# Patient Record
Sex: MALE | Race: WHITE | NOT HISPANIC OR LATINO | Employment: FULL TIME | URBAN - METROPOLITAN AREA
[De-identification: names, ages, dates, MRNs, and addresses within clinical notes are randomized per-mention and may not be internally consistent; named-entity substitution may affect disease eponyms.]

---

## 2017-02-17 RX ORDER — AMLODIPINE BESYLATE 10 MG/1
10 TABLET ORAL DAILY
Qty: 30 TABLET | Refills: 0 | Status: SHIPPED | OUTPATIENT
Start: 2017-02-17 | End: 2017-09-25 | Stop reason: SDUPTHER

## 2017-04-27 ENCOUNTER — OFFICE VISIT (OUTPATIENT)
Dept: FAMILY MEDICINE CLINIC | Facility: CLINIC | Age: 42
End: 2017-04-27

## 2017-04-27 VITALS
OXYGEN SATURATION: 96 % | SYSTOLIC BLOOD PRESSURE: 110 MMHG | RESPIRATION RATE: 16 BRPM | DIASTOLIC BLOOD PRESSURE: 80 MMHG | HEIGHT: 68 IN | HEART RATE: 90 BPM | BODY MASS INDEX: 34.86 KG/M2 | WEIGHT: 230 LBS | TEMPERATURE: 98.7 F

## 2017-04-27 DIAGNOSIS — J06.9 ACUTE URI: Primary | ICD-10-CM

## 2017-04-27 LAB
EXPIRATION DATE: NORMAL
INTERNAL CONTROL: NORMAL
Lab: NORMAL
S PYO AG THROAT QL: NEGATIVE

## 2017-04-27 PROCEDURE — 99213 OFFICE O/P EST LOW 20 MIN: CPT | Performed by: PHYSICIAN ASSISTANT

## 2017-04-27 PROCEDURE — 87880 STREP A ASSAY W/OPTIC: CPT | Performed by: PHYSICIAN ASSISTANT

## 2017-04-27 RX ORDER — AZITHROMYCIN 250 MG/1
TABLET, FILM COATED ORAL
Qty: 6 TABLET | Refills: 0 | Status: SHIPPED | OUTPATIENT
Start: 2017-04-27 | End: 2017-09-25

## 2017-04-27 NOTE — PROGRESS NOTES
Subjective   Brandan Higuera is a 41 y.o. male.     History of Present Illness   Brandan Higuera 41 y.o. male who presents for evaluation of upper respiratory congestion, sore throat, cough, fatigue. Symptoms include ear pressure, nasal blockage, post nasal drip, productive cough and hoarseness.  Onset of symptoms was 3 days ago, gradually worsening since that time. Patient denies shortness of breath, wheezing, fever.   Evaluation to date: none Treatment to date:  OTC oral decongestants and OTC antihistamines.       The following portions of the patient's history were reviewed and updated as appropriate: allergies, current medications, past family history, past medical history, past social history, past surgical history and problem list.    Review of Systems   Constitutional: Positive for fatigue. Negative for activity change and unexpected weight change.   HENT: Positive for congestion, ear discharge, postnasal drip, sore throat and trouble swallowing. Negative for ear pain.    Eyes: Negative for pain and discharge.   Respiratory: Positive for cough. Negative for chest tightness, shortness of breath and wheezing.    Cardiovascular: Negative for chest pain and palpitations.   Gastrointestinal: Negative for abdominal pain, diarrhea and vomiting.   Endocrine: Negative.    Genitourinary: Negative.    Musculoskeletal: Positive for neck pain and neck stiffness. Negative for joint swelling.   Skin: Negative for color change, rash and wound.   Allergic/Immunologic: Negative.    Neurological: Positive for dizziness and headaches. Negative for seizures and syncope.   Psychiatric/Behavioral: Negative.        Objective   Physical Exam   Constitutional: He is oriented to person, place, and time. He appears well-developed and well-nourished.  Non-toxic appearance. No distress.   HENT:   Head: Normocephalic and atraumatic. Hair is normal.   Right Ear: External ear normal. No drainage, swelling or tenderness. Tympanic membrane  is retracted.   Left Ear: External ear normal. No drainage, swelling or tenderness. Tympanic membrane is retracted.   Nose: Mucosal edema present. No epistaxis.   Mouth/Throat: Uvula is midline and mucous membranes are normal. No oral lesions. No uvula swelling. Posterior oropharyngeal erythema present. No oropharyngeal exudate.   Eyes: Conjunctivae and EOM are normal. Pupils are equal, round, and reactive to light. Right eye exhibits no discharge. Left eye exhibits no discharge. No scleral icterus.   Neck: Normal range of motion. Neck supple.   Cardiovascular: Normal rate, regular rhythm and normal heart sounds.  Exam reveals no gallop.    No murmur heard.  Pulmonary/Chest: Breath sounds normal. No stridor. No respiratory distress. He has no wheezes. He has no rales. He exhibits no tenderness.   Abdominal: Soft. There is no tenderness.   Lymphadenopathy:     He has cervical adenopathy.   Neurological: He is alert and oriented to person, place, and time. He exhibits normal muscle tone.   Skin: Skin is warm and dry. No rash noted. He is not diaphoretic.   Psychiatric: He has a normal mood and affect. His behavior is normal. Judgment and thought content normal.   Nursing note and vitals reviewed.      Assessment/Plan   Problems Addressed this Visit     None      Visit Diagnoses     Acute URI    -  Primary    Relevant Medications    azithromycin (ZITHROMAX) 250 MG tablet    Other Relevant Orders    POC Rapid Strep A

## 2017-04-27 NOTE — PATIENT INSTRUCTIONS
For throat pain:  Can gargle with 1/2 Maalox and 1/2 Benadryl liquid ---mix, gargle and spit Take Tylenol for fever or aches  Rest as needed  Call not better in 7 days  Increase fluids  Call if worse  Can use generic Afrin nasal spray up to 5 days for nasal congestion  Finish antibiotic course of therapy      Start Zpak if worse or still sick after a week

## 2017-09-25 ENCOUNTER — OFFICE VISIT (OUTPATIENT)
Dept: FAMILY MEDICINE CLINIC | Facility: CLINIC | Age: 42
End: 2017-09-25

## 2017-09-25 VITALS
HEIGHT: 68 IN | SYSTOLIC BLOOD PRESSURE: 140 MMHG | DIASTOLIC BLOOD PRESSURE: 100 MMHG | OXYGEN SATURATION: 98 % | BODY MASS INDEX: 35.01 KG/M2 | WEIGHT: 231 LBS | TEMPERATURE: 98.4 F | RESPIRATION RATE: 16 BRPM | HEART RATE: 64 BPM

## 2017-09-25 DIAGNOSIS — E78.2 MIXED HYPERLIPIDEMIA: ICD-10-CM

## 2017-09-25 DIAGNOSIS — I10 ESSENTIAL HYPERTENSION: Primary | ICD-10-CM

## 2017-09-25 DIAGNOSIS — E55.9 VITAMIN D DEFICIENCY DISEASE: ICD-10-CM

## 2017-09-25 DIAGNOSIS — Z87.39 HISTORY OF GOUT: ICD-10-CM

## 2017-09-25 DIAGNOSIS — Z00.00 LABORATORY EXAMINATION ORDERED AS PART OF A ROUTINE GENERAL MEDICAL EXAMINATION: ICD-10-CM

## 2017-09-25 PROCEDURE — 99213 OFFICE O/P EST LOW 20 MIN: CPT | Performed by: PHYSICIAN ASSISTANT

## 2017-09-25 RX ORDER — AMLODIPINE BESYLATE 10 MG/1
10 TABLET ORAL DAILY
Qty: 90 TABLET | Refills: 1 | Status: SHIPPED | OUTPATIENT
Start: 2017-09-25 | End: 2018-04-06 | Stop reason: SDUPTHER

## 2017-09-25 RX ORDER — LISINOPRIL 20 MG/1
TABLET ORAL
Qty: 90 TABLET | Refills: 1 | Status: SHIPPED | OUTPATIENT
Start: 2017-09-25 | End: 2018-04-06 | Stop reason: SDUPTHER

## 2017-09-25 NOTE — PROGRESS NOTES
Subjective   Brandan Higuera is a 42 y.o. male.     History of Present Illness   Brandan Higuera 42 y.o. male who presents today for routine follow up check and medication refills.  he has a history of   Patient Active Problem List   Diagnosis   • Hypertension   • Vitamin D deficiency disease   • Hyperlipidemia   • Acute idiopathic gout of ankle   .  Since the last visit, he has overall felt well.  He has bp has been controlled and out of meds for 2 mos and will restart Rx.  he has been compliant with current medications have reviewed them.  The patient denies medication side effects.  He is off Allopurinol and will let me know if wants to restart it.  No results found for: CHOL  Lab Results   Component Value Date    TRIG 274 (H) 11/03/2016     Lab Results   Component Value Date    HDL 49 11/03/2016     No results found for: LDLCALC  Lab Results   Component Value Date    LDL 81 11/03/2016     No results found for: HDLLDLRATIO  No components found for: CHOLHDL  Lab Results   Component Value Date    BUN 16 11/03/2016    CREATININE 1.19 11/03/2016    EGFRIFNONA 68 11/03/2016    EGFRIFAFRI 82 11/03/2016    BCR 13.4 11/03/2016    K 4.1 11/03/2016    CO2 25.1 11/03/2016    CALCIUM 9.9 11/03/2016    PROTENTOTREF 7.1 11/03/2016    ALBUMIN 4.70 11/03/2016    LABIL2 2.0 11/03/2016    AST 21 11/03/2016    ALT 19 11/03/2016       Sees Serena GALAN for psych    On e cig  He needs [facility] paperwork for gout  I need report 2-3 episodes a year and miss 3-4 days of work at a time for treatment.  He gets in great toe and can progress to knee with red, hot, edematous, painful joint and extreme pain to weight bear.  He has oral pred for flare ups.   The following portions of the patient's history were reviewed and updated as appropriate: allergies, current medications, past family history, past medical history, past social history, past surgical history and problem list.    Review of Systems   Constitutional: Negative for activity change,  appetite change and unexpected weight change.   HENT: Negative for nosebleeds and trouble swallowing.    Eyes: Negative for pain and visual disturbance.   Respiratory: Negative for chest tightness, shortness of breath and wheezing.    Cardiovascular: Negative for chest pain and palpitations.   Gastrointestinal: Negative for abdominal pain and blood in stool.   Endocrine: Negative.    Genitourinary: Negative for difficulty urinating and hematuria.   Musculoskeletal: Negative for joint swelling.   Skin: Negative for color change and rash.   Allergic/Immunologic: Negative.    Neurological: Negative for syncope and speech difficulty.   Hematological: Negative for adenopathy.   Psychiatric/Behavioral: Negative for agitation and confusion.   All other systems reviewed and are negative.      Objective   Physical Exam   Constitutional: He is oriented to person, place, and time. He appears well-developed and well-nourished. No distress.   HENT:   Head: Normocephalic and atraumatic.   Eyes: Conjunctivae and EOM are normal. Pupils are equal, round, and reactive to light. Right eye exhibits no discharge. Left eye exhibits no discharge. No scleral icterus.   Neck: Normal range of motion. Neck supple. No tracheal deviation present. No thyromegaly present.   Cardiovascular: Normal rate, regular rhythm, normal heart sounds, intact distal pulses and normal pulses.  Exam reveals no gallop.    No murmur heard.  Pulmonary/Chest: Effort normal and breath sounds normal. No respiratory distress. He has no wheezes. He has no rales.   Musculoskeletal: Normal range of motion.   Neurological: He is alert and oriented to person, place, and time. He exhibits normal muscle tone. Coordination normal.   Skin: Skin is warm. No rash noted. No erythema. No pallor.   Psychiatric: He has a normal mood and affect. His behavior is normal. Judgment and thought content normal.   Nursing note and vitals reviewed.      Assessment/Plan   Problems Addressed  this Visit        Cardiovascular and Mediastinum    Hypertension - Primary    Relevant Medications    lisinopril (PRINIVIL,ZESTRIL) 20 MG tablet    amLODIPine (NORVASC) 10 MG tablet    Other Relevant Orders    Comprehensive metabolic panel    Lipid panel    CBC and Differential    TSH    Urinalysis With Microscopic    Uric Acid    T4, Free    Hyperlipidemia    Relevant Orders    Comprehensive metabolic panel    Lipid panel    CBC and Differential    TSH    Urinalysis With Microscopic    Uric Acid    T4, Free       Digestive    Vitamin D deficiency disease    Relevant Orders    Comprehensive metabolic panel    Lipid panel    CBC and Differential    TSH    Urinalysis With Microscopic    Uric Acid    T4, Free      Other Visit Diagnoses     History of gout        Relevant Orders    Comprehensive metabolic panel    Lipid panel    CBC and Differential    TSH    Urinalysis With Microscopic    Uric Acid    T4, Free    Laboratory examination ordered as part of a routine general medical examination        Relevant Orders    Comprehensive metabolic panel    Lipid panel    CBC and Differential    TSH    Urinalysis With Microscopic    Uric Acid    T4, Free

## 2017-09-25 NOTE — PATIENT INSTRUCTIONS
Stop smoking  Low glycemic index diet  Exercise 30 minutes most days of the week  Make sure you get results on any labs or tests we ordered today  We discussed medications and how to take them as prescribed  Sleep 6-8 hours each night if possible  If you have not signed up for [email or web address], please activate your code ASAP from your After Visit Summary today    LDL goal <100  LDL goal if heart disease <70  HDL goal >60  Triglyceride goal <150  BP goal =<130/80  Fasting glucose <100

## 2018-04-06 ENCOUNTER — OFFICE VISIT (OUTPATIENT)
Dept: FAMILY MEDICINE CLINIC | Facility: CLINIC | Age: 43
End: 2018-04-06

## 2018-04-06 VITALS
HEIGHT: 68 IN | RESPIRATION RATE: 16 BRPM | TEMPERATURE: 97.7 F | WEIGHT: 236 LBS | HEART RATE: 88 BPM | DIASTOLIC BLOOD PRESSURE: 80 MMHG | OXYGEN SATURATION: 96 % | SYSTOLIC BLOOD PRESSURE: 120 MMHG | BODY MASS INDEX: 35.77 KG/M2

## 2018-04-06 DIAGNOSIS — E55.9 VITAMIN D DEFICIENCY DISEASE: ICD-10-CM

## 2018-04-06 DIAGNOSIS — M10.071 ACUTE IDIOPATHIC GOUT OF RIGHT ANKLE: Primary | ICD-10-CM

## 2018-04-06 DIAGNOSIS — Z00.00 LABORATORY EXAMINATION ORDERED AS PART OF A ROUTINE GENERAL MEDICAL EXAMINATION: ICD-10-CM

## 2018-04-06 DIAGNOSIS — I10 ESSENTIAL HYPERTENSION: ICD-10-CM

## 2018-04-06 DIAGNOSIS — E78.2 MIXED HYPERLIPIDEMIA: ICD-10-CM

## 2018-04-06 PROCEDURE — 99213 OFFICE O/P EST LOW 20 MIN: CPT | Performed by: PHYSICIAN ASSISTANT

## 2018-04-06 RX ORDER — PREDNISONE 20 MG/1
20 TABLET ORAL 2 TIMES DAILY
Qty: 14 TABLET | Refills: 3 | Status: SHIPPED | OUTPATIENT
Start: 2018-04-06 | End: 2019-02-26 | Stop reason: SDUPTHER

## 2018-04-06 RX ORDER — AMLODIPINE BESYLATE 10 MG/1
10 TABLET ORAL DAILY
Qty: 90 TABLET | Refills: 1 | Status: SHIPPED | OUTPATIENT
Start: 2018-04-06 | End: 2019-01-22 | Stop reason: SDUPTHER

## 2018-04-06 RX ORDER — LISINOPRIL 20 MG/1
TABLET ORAL
Qty: 90 TABLET | Refills: 1 | Status: SHIPPED | OUTPATIENT
Start: 2018-04-06 | End: 2018-05-10 | Stop reason: SDUPTHER

## 2018-04-06 NOTE — PROGRESS NOTES
Subjective   Brandan Higuera is a 42 y.o. male.     History of Present Illness   Brandan Higuera 42 y.o. male who presents today for routine follow up check and medication refills.  he has a history of   Patient Active Problem List   Diagnosis   • Hypertension   • Vitamin D deficiency disease   • Hyperlipidemia   • Acute idiopathic gout of ankle   .  Since the last visit, he has overall felt well.  He has Hypertenision and is well controlled on medication, Hyperlipidemia and working on this with diet and exercise and Vitamin D deficiency and will update labs to confirm level is at goal >30.  he has been compliant with current medications have reviewed them.  The patient denies medication side effects.  Still need to update labs!!!! Copy to [facility]  Results for orders placed or performed in visit on 04/27/17   POC Rapid Strep A   Result Value Ref Range    Rapid Strep A Screen Negative Negative, VALID, INVALID, Not Performed    Internal Control Passed Passed    Lot Number [id number]     Expiration Date 7/2,018        Gout flare right ankle since yesterday;  It's been a year since had it  The following portions of the patient's history were reviewed and updated as appropriate: allergies, current medications, past family history, past medical history, past social history, past surgical history and problem list.    Review of Systems   Constitutional: Positive for fatigue. Negative for activity change, appetite change and unexpected weight change.   HENT: Positive for sinus pressure. Negative for nosebleeds and trouble swallowing.    Eyes: Negative for pain and visual disturbance.   Respiratory: Negative for chest tightness, shortness of breath and wheezing.    Cardiovascular: Negative for chest pain and palpitations.   Gastrointestinal: Negative for abdominal pain and blood in stool.   Endocrine: Negative.    Genitourinary: Negative for difficulty urinating and hematuria.   Musculoskeletal: Negative for joint swelling.    Skin: Negative for color change and rash.   Allergic/Immunologic: Negative.    Neurological: Negative for syncope and speech difficulty.   Hematological: Negative for adenopathy.   Psychiatric/Behavioral: Negative for agitation and confusion.   All other systems reviewed and are negative.      Objective   Physical Exam   Constitutional: He is oriented to person, place, and time. He appears well-developed and well-nourished. No distress.   HENT:   Head: Normocephalic and atraumatic.   Eyes: Conjunctivae and EOM are normal. Pupils are equal, round, and reactive to light. Right eye exhibits no discharge. Left eye exhibits no discharge. No scleral icterus.   Neck: Normal range of motion. Neck supple. No tracheal deviation present. No thyromegaly present.   Cardiovascular: Normal rate, regular rhythm, normal heart sounds, intact distal pulses and normal pulses.  Exam reveals no gallop.    No murmur heard.  Pulmonary/Chest: Effort normal and breath sounds normal. No respiratory distress. He has no wheezes. He has no rales.   Musculoskeletal: Normal range of motion.   Neurological: He is alert and oriented to person, place, and time. He exhibits normal muscle tone. Coordination normal.   Skin: Skin is warm. No rash noted. There is erythema. No pallor.   Right ankle with minimal edema and pink; not sore now to touch;  Pain to walk;    Psychiatric: He has a normal mood and affect. His behavior is normal. Judgment and thought content normal.   Nursing note and vitals reviewed.      Assessment/Plan   Problems Addressed this Visit        Cardiovascular and Mediastinum    Hypertension    Relevant Orders    Comprehensive metabolic panel    Lipid panel    CBC and Differential    TSH    T4, Free    Hemoglobin A1c    Vitamin B12    Folate    Vitamin D 25 Hydroxy    Urinalysis With Microscopic - Urine, Clean Catch    Uric Acid    Hyperlipidemia    Relevant Orders    Comprehensive metabolic panel    Lipid panel    CBC and  Differential    TSH    T4, Free    Hemoglobin A1c    Vitamin B12    Folate    Vitamin D 25 Hydroxy    Urinalysis With Microscopic - Urine, Clean Catch    Uric Acid       Digestive    Vitamin D deficiency disease    Relevant Orders    Comprehensive metabolic panel    Lipid panel    CBC and Differential    TSH    T4, Free    Hemoglobin A1c    Vitamin B12    Folate    Vitamin D 25 Hydroxy    Urinalysis With Microscopic - Urine, Clean Catch    Uric Acid       Musculoskeletal and Integument    Acute idiopathic gout of ankle - Primary    Relevant Orders    Comprehensive metabolic panel    Lipid panel    CBC and Differential    TSH    T4, Free    Hemoglobin A1c    Vitamin B12    Folate    Vitamin D 25 Hydroxy    Urinalysis With Microscopic - Urine, Clean Catch    Uric Acid      Other Visit Diagnoses     Laboratory examination ordered as part of a routine general medical examination        Relevant Orders    Comprehensive metabolic panel    Lipid panel    CBC and Differential    TSH    T4, Free    Hemoglobin A1c    Vitamin B12    Folate    Vitamin D 25 Hydroxy    Urinalysis With Microscopic - Urine, Clean Catch    Uric Acid

## 2018-04-06 NOTE — PATIENT INSTRUCTIONS
Low glycemic index diet  Exercise 30 minutes most days of the week  Make sure you get results on any labs or tests we ordered today  We discussed medications and how to take them as prescribed  Sleep 6-8 hours each night if possible  If you have not signed up for [employer], please activate your code ASAP from your After Visit Summary today    LDL goal <100  LDL goal if heart disease <70  HDL goal >60  Triglyceride goal <150  BP goal =<130/80  Fasting glucose <100      Gout  Gout is painful swelling that can occur in some of your joints. Gout is a type of arthritis. This condition is caused by having too much uric acid in your body. Uric acid is a chemical that forms when your body breaks down substances called purines. Purines are important for building body proteins.  When your body has too much uric acid, sharp crystals can form and build up inside your joints. This causes pain and swelling. Gout attacks can happen quickly and be very painful (acute gout). Over time, the attacks can affect more joints and become more frequent (chronic gout). Gout can also cause uric acid to build up under your skin and inside your kidneys.  What are the causes?  This condition is caused by too much uric acid in your blood. This can occur because:  · Your kidneys do not remove enough uric acid from your blood. This is the most common cause.  · Your body makes too much uric acid. This can occur with some cancers and cancer treatments. It can also occur if your body is breaking down too many red blood cells (hemolytic anemia).  · You eat too many foods that are high in purines. These foods include organ meats and some seafood. Alcohol, especially beer, is also high in purines.  A gout attack may be triggered by trauma or stress.  What increases the risk?  This condition is more likely to develop in people who:  · Have a family history of gout.  · Are male and middle-aged.  · Are female and have gone through menopause.  · Are  obese.  · Frequently drink alcohol, especially beer.  · Are dehydrated.  · Lose weight too quickly.  · Have an organ transplant.  · Have lead poisoning.  · Take certain medicines, including aspirin, cyclosporine, diuretics, levodopa, and niacin.  · Have kidney disease or psoriasis.  What are the signs or symptoms?  An attack of acute gout happens quickly. It usually occurs in just one joint. The most common place is the big toe. Attacks often start at night. Other joints that may be affected include joints of the feet, ankle, knee, fingers, wrist, or elbow. Symptoms may include:  · Severe pain.  · Warmth.  · Swelling.  · Stiffness.  · Tenderness. The affected joint may be very painful to touch.  · Shiny, red, or purple skin.  · Chills and fever.  Chronic gout may cause symptoms more frequently. More joints may be involved. You may also have white or yellow lumps (tophi) on your hands or feet or in other areas near your joints.  How is this diagnosed?  This condition is diagnosed based on your symptoms, medical history, and physical exam. You may have tests, such as:  · Blood tests to measure uric acid levels.  · Removal of joint fluid with a needle (aspiration) to look for uric acid crystals.  · X-rays to look for joint damage.  How is this treated?  Treatment for this condition has two phases: treating an acute attack and preventing future attacks. Acute gout treatment may include medicines to reduce pain and swelling, including:  · NSAIDs.  · Steroids. These are strong anti-inflammatory medicines that can be taken by mouth (orally) or injected into a joint.  · Colchicine. This medicine relieves pain and swelling when it is taken soon after an attack. It can be given orally or through an IV tube.  Preventive treatment may include:  · Daily use of smaller doses of NSAIDs or colchicine.  · Use of a medicine that reduces uric acid levels in your blood.  · Changes to your diet. You may need to see a specialist about  healthy eating (dietitian).  Follow these instructions at home:  During a Gout Attack   · If directed, apply ice to the affected area:  ¨ Put ice in a plastic bag.  ¨ Place a towel between your skin and the bag.  ¨ Leave the ice on for 20 minutes, 2-3 times a day.  · Rest the joint as much as possible. If the affected joint is in your leg, you may be given crutches to use.  · Raise (elevate) the affected joint above the level of your heart as often as possible.  · Drink enough fluids to keep your urine clear or pale yellow.  · Take over-the-counter and prescription medicines only as told by your health care provider.  · Do not drive or operate heavy machinery while taking prescription pain medicine.  · Follow instructions from your health care provider about eating or drinking restrictions.  · Return to your normal activities as told by your health care provider. Ask your health care provider what activities are safe for you.  Avoiding Future Gout Attacks   · Follow a low-purine diet as told by your dietitian or health care provider. Avoid foods and drinks that are high in purines, including liver, kidney, anchovies, asparagus, herring, mushrooms, mussels, and beer.  · Limit alcohol intake to no more than 1 drink a day for nonpregnant women and 2 drinks a day for men. One drink equals 12 oz of beer, 5 oz of wine, or 1½ oz of hard liquor.  · Maintain a healthy weight or lose weight if you are overweight. If you want to lose weight, talk with your health care provider. It is important that you do not lose weight too quickly.  · Start or maintain an exercise program as told by your health care provider.  · Drink enough fluids to keep your urine clear or pale yellow.  · Take over-the-counter and prescription medicines only as told by your health care provider.  · Keep all follow-up visits as told by your health care provider. This is important.  Contact a health care provider if:  · You have another gout attack.  · You  continue to have symptoms of a gout attack after10 days of treatment.  · You have side effects from your medicines.  · You have chills or a fever.  · You have burning pain when you urinate.  · You have pain in your lower back or belly.  Get help right away if:  · You have severe or uncontrolled pain.  · You cannot urinate.  This information is not intended to replace advice given to you by your health care provider. Make sure you discuss any questions you have with your health care provider.  Document Released: 12/15/2001 Document Revised: 05/25/2017 Document Reviewed: 09/29/2016  [employer] Interactive Patient Education © 2017 [employer]  Low-Purine Diet  Purines are compounds that affect the level of uric acid in your body. A low-purine diet is a diet that is low in purines. Eating a low-purine diet can prevent the level of uric acid in your body from getting too high and causing gout or kidney stones or both.  What do I need to know about this diet?  · Choose low-purine foods. Examples of low-purine foods are listed in the next section.  · Drink plenty of fluids, especially water. Fluids can help remove uric acid from your body. Try to drink 8-16 cups (1.9-3.8 L) a day.  · Limit foods high in fat, especially saturated fat, as fat makes it harder for the body to get rid of uric acid. Foods high in saturated fat include pizza, cheese, ice cream, whole milk, fried foods, and gravies. Choose foods that are lower in fat and lean sources of protein. Use olive oil when cooking as it contains healthy fats that are not high in saturated fat.  · Limit alcohol. Alcohol interferes with the elimination of uric acid from your body. If you are having a gout attack, avoid all alcohol.  · Keep in mind that different people’s bodies react differently to different foods. You will probably learn over time which foods do or do not affect you. If you discover that a food tends to cause your gout to flare up, avoid eating that food.  You can more freely enjoy foods that do not cause problems. If you have any questions about a food item, talk to your dietitian or health care provider.  Which foods are low, moderate, and high in purines?  The following is a list of foods that are low, moderate, and high in purines. You can eat any amount of the foods that are low in purines. You may be able to have small amounts of foods that are moderate in purines. Ask your health care provider how much of a food moderate in purines you can have. Avoid foods high in purines.  Grains   · Foods low in purines: Enriched white bread, pasta, rice, cake, cornbread, popcorn.  · Foods moderate in purines: Whole-grain breads and cereals, wheat germ, bran, oatmeal. Uncooked oatmeal. Dry wheat bran or wheat germ.  · Foods high in purines: Pancakes, [place] toast, biscuits, muffins.  Vegetables   · Foods low in purines: All vegetables, except those that are moderate in purines.  · Foods moderate in purines: Asparagus, cauliflower, spinach, mushrooms, green peas.  Fruits   · All fruits are low in purines.  Meats and other Protein Foods   · Foods low in purines: Eggs, nuts, peanut butter.  · Foods moderate in purines: 80-90% lean beef, lamb, veal, pork, poultry, fish, eggs, peanut butter, nuts. Crab, lobster, oysters, and shrimp. Cooked dried beans, peas, and lentils.  · Foods high in purines: Anchovies, sardines, herring, mussels, tuna, codfish, scallops, trout, and barbara. [place]. Organ meats (such as liver or kidney). Tripe. Game meat. Goose. Sweetbreads.  Dairy   · All dairy foods are low in purines. Low-fat and fat-free dairy products are best because they are low in saturated fat.  Beverages   · Drinks low in purines: Water, carbonated beverages, tea, coffee, cocoa.  · Drinks moderate in purines: Soft drinks and other drinks sweetened with high-fructose corn syrup. Juices. To find whether a food or drink is sweetened with high-fructose corn syrup, look at the ingredients  list.  · Drinks high in purines: Alcoholic beverages (such as beer).  Condiments   · Foods low in purines: Salt, herbs, olives, pickles, relishes, vinegar.  · Foods moderate in purines: Butter, margarine, oils, mayonnaise.  Fats and Oils   · Foods low in purines: All types, except gravies and sauces made with meat.  · Foods high in purines: Gravies and sauces made with meat.  Other Foods   · Foods low in purines: Sugars, sweets, gelatin. Cake. Soups made without meat.  · Foods moderate in purines: Meat-based or fish-based soups, broths, or bouillons. Foods and drinks sweetened with high-fructose corn syrup.  · Foods high in purines: High-fat desserts (such as ice cream, cookies, cakes, pies, doughnuts, and chocolate).  Contact your dietitian for more information on foods that are not listed here.   This information is not intended to replace advice given to you by your health care provider. Make sure you discuss any questions you have with your health care provider.  Document Released: 04/13/2012 Document Revised: 05/25/2017 Document Reviewed: 11/24/2014  [employer] Interactive Patient Education © 2017 [employer]

## 2018-05-10 RX ORDER — LISINOPRIL 20 MG/1
TABLET ORAL
Qty: 90 TABLET | Refills: 1 | Status: SHIPPED | OUTPATIENT
Start: 2018-05-10 | End: 2018-11-09 | Stop reason: SDUPTHER

## 2018-11-09 RX ORDER — LISINOPRIL 20 MG/1
TABLET ORAL
Qty: 90 TABLET | Refills: 0 | Status: SHIPPED | OUTPATIENT
Start: 2018-11-09 | End: 2019-02-26

## 2019-01-22 RX ORDER — AMLODIPINE BESYLATE 10 MG/1
10 TABLET ORAL DAILY
Qty: 30 TABLET | Refills: 0 | Status: SHIPPED | OUTPATIENT
Start: 2019-01-22 | End: 2019-02-26 | Stop reason: SDUPTHER

## 2019-02-25 ENCOUNTER — OFFICE VISIT (OUTPATIENT)
Dept: FAMILY MEDICINE CLINIC | Facility: CLINIC | Age: 44
End: 2019-02-25

## 2019-02-25 VITALS
DIASTOLIC BLOOD PRESSURE: 84 MMHG | BODY MASS INDEX: 35.77 KG/M2 | RESPIRATION RATE: 16 BRPM | OXYGEN SATURATION: 98 % | SYSTOLIC BLOOD PRESSURE: 130 MMHG | HEIGHT: 68 IN | HEART RATE: 76 BPM | TEMPERATURE: 97.8 F | WEIGHT: 236 LBS

## 2019-02-25 DIAGNOSIS — E55.9 VITAMIN D DEFICIENCY DISEASE: ICD-10-CM

## 2019-02-25 DIAGNOSIS — I10 ESSENTIAL HYPERTENSION: Primary | ICD-10-CM

## 2019-02-25 DIAGNOSIS — Z00.00 LABORATORY EXAMINATION ORDERED AS PART OF A ROUTINE GENERAL MEDICAL EXAMINATION: ICD-10-CM

## 2019-02-25 DIAGNOSIS — E78.2 MIXED HYPERLIPIDEMIA: ICD-10-CM

## 2019-02-25 DIAGNOSIS — M10.079 ACUTE IDIOPATHIC GOUT OF ANKLE, UNSPECIFIED LATERALITY: ICD-10-CM

## 2019-02-25 PROCEDURE — 90732 PPSV23 VACC 2 YRS+ SUBQ/IM: CPT | Performed by: PHYSICIAN ASSISTANT

## 2019-02-25 PROCEDURE — 90472 IMMUNIZATION ADMIN EACH ADD: CPT | Performed by: PHYSICIAN ASSISTANT

## 2019-02-25 PROCEDURE — 99214 OFFICE O/P EST MOD 30 MIN: CPT | Performed by: PHYSICIAN ASSISTANT

## 2019-02-25 PROCEDURE — 90715 TDAP VACCINE 7 YRS/> IM: CPT | Performed by: PHYSICIAN ASSISTANT

## 2019-02-25 PROCEDURE — 90471 IMMUNIZATION ADMIN: CPT | Performed by: PHYSICIAN ASSISTANT

## 2019-02-25 NOTE — PATIENT INSTRUCTIONS
Low glycemic index diet  Exercise 30 minutes most days of the week  Make sure you get results on any labs or tests we ordered today  We discussed medications and how to take them as prescribed  Sleep 6-8 hours each night if possible  If you have not signed up for [employer], please activate your code ASAP from your After Visit Summary today    LDL goal <100  LDL goal if heart disease <70  HDL goal >60  Triglyceride goal <150  BP goal =<130/80  Fasting glucose <100

## 2019-02-25 NOTE — PROGRESS NOTES
Subjective   Brandan Higuera is a 43 y.o. male.     History of Present Illness   Brandan Higuera 43 y.o. male who presents today for routine follow up check and medication refills.  he has a history of   Patient Active Problem List   Diagnosis   • Hypertension   • Vitamin D deficiency disease   • Mixed hyperlipidemia   • Acute idiopathic gout of ankle   .  Since the last visit, he has overall felt well.  He has Hyperlipidemia and working on this with diet and exercise, Vitamin D deficiency and will update labs to confirm level is at goal >30 and HTN and here for med refills.  he has been compliant with current medications have reviewed them.  The patient denies medication side effects.  I will raise dose of Lisinopril to 40mg and see him back one month    Results for orders placed or performed in visit on 04/27/17   POC Rapid Strep A   Result Value Ref Range    Rapid Strep A Screen Negative Negative, VALID, INVALID, Not Performed    Internal Control Passed Passed    Lot Number [id number]     Expiration Date 7/2,018    had gout Jan and took pred--only needs about 2 days to stop it and works    Mom has DMII  I must have labs to continue bp meds; must show renal functions  Sees [facility] for psych  Brother had MI 38    I must get labs today and then will refill meds    Has DOT Physical for work    I will update vaccines; Tdap and pneumovax---smoker  The following portions of the patient's history were reviewed and updated as appropriate: allergies, current medications, past family history, past medical history, past social history, past surgical history and problem list.    Review of Systems   Constitutional: Negative for activity change, appetite change and unexpected weight change.   HENT: Positive for congestion and sinus pressure. Negative for nosebleeds and trouble swallowing.    Eyes: Negative for pain and visual disturbance.   Respiratory: Positive for cough. Negative for chest tightness, shortness of breath and  wheezing.    Cardiovascular: Positive for chest pain. Negative for palpitations.   Gastrointestinal: Negative for abdominal pain and blood in stool.   Endocrine: Negative.    Genitourinary: Negative for difficulty urinating and hematuria.   Musculoskeletal: Positive for neck pain and neck stiffness. Negative for joint swelling.   Skin: Negative for color change and rash.   Allergic/Immunologic: Negative.    Neurological: Negative for syncope and speech difficulty.   Hematological: Negative for adenopathy.   Psychiatric/Behavioral: Negative for agitation and confusion.   All other systems reviewed and are negative.      Objective   Physical Exam   Constitutional: He is oriented to person, place, and time. He appears well-developed and well-nourished. No distress.   HENT:   Head: Normocephalic and atraumatic.   Eyes: Conjunctivae and EOM are normal. Pupils are equal, round, and reactive to light. Right eye exhibits no discharge. Left eye exhibits no discharge. No scleral icterus.   Neck: Normal range of motion. Neck supple. No tracheal deviation present. No thyromegaly present.   Cardiovascular: Normal rate, regular rhythm, normal heart sounds, intact distal pulses and normal pulses. Exam reveals no gallop.   No murmur heard.  Pulmonary/Chest: Effort normal and breath sounds normal. No respiratory distress. He has no wheezes. He has no rales.   Musculoskeletal: Normal range of motion.   Neurological: He is alert and oriented to person, place, and time. He exhibits normal muscle tone. Coordination normal.   Skin: Skin is warm. No rash noted. No erythema. No pallor.   Psychiatric: He has a normal mood and affect. His behavior is normal. Judgment and thought content normal.   Nursing note and vitals reviewed.      Assessment/Plan   Problems Addressed this Visit        Cardiovascular and Mediastinum    Hypertension - Primary    Mixed hyperlipidemia       Digestive    Vitamin D deficiency disease       Musculoskeletal and  Integument    Acute idiopathic gout of ankle      Other Visit Diagnoses     Laboratory examination ordered as part of a routine general medical examination        Relevant Orders    Comprehensive metabolic panel    Lipid panel    CBC and Differential    TSH    Hemoglobin A1c    T4, Free    T3, Free    Uric Acid    Vitamin B12    Folate    Vitamin D 25 Hydroxy                  normal

## 2019-02-26 LAB
25(OH)D3+25(OH)D2 SERPL-MCNC: 49.3 NG/ML (ref 30–100)
ALBUMIN SERPL-MCNC: 4.6 G/DL (ref 3.5–5.2)
ALBUMIN/GLOB SERPL: 1.8 G/DL
ALP SERPL-CCNC: 68 U/L (ref 39–117)
ALT SERPL-CCNC: 22 U/L (ref 1–41)
AST SERPL-CCNC: 20 U/L (ref 1–40)
BASOPHILS # BLD AUTO: 0.07 10*3/MM3 (ref 0–0.2)
BASOPHILS NFR BLD AUTO: 1.1 % (ref 0–1.5)
BILIRUB SERPL-MCNC: 0.3 MG/DL (ref 0.1–1.2)
BUN SERPL-MCNC: 11 MG/DL (ref 6–20)
BUN/CREAT SERPL: 10.8 (ref 7–25)
CALCIUM SERPL-MCNC: 9.8 MG/DL (ref 8.6–10.5)
CHLORIDE SERPL-SCNC: 102 MMOL/L (ref 98–107)
CHOLEST SERPL-MCNC: 204 MG/DL (ref 0–200)
CO2 SERPL-SCNC: 26.6 MMOL/L (ref 22–29)
CREAT SERPL-MCNC: 1.02 MG/DL (ref 0.76–1.27)
EOSINOPHIL # BLD AUTO: 0.65 10*3/MM3 (ref 0–0.4)
EOSINOPHIL NFR BLD AUTO: 10.1 % (ref 0.3–6.2)
ERYTHROCYTE [DISTWIDTH] IN BLOOD BY AUTOMATED COUNT: 13.4 % (ref 12.3–15.4)
FOLATE SERPL-MCNC: 7.85 NG/ML (ref 4.78–24.2)
GLOBULIN SER CALC-MCNC: 2.5 GM/DL
GLUCOSE SERPL-MCNC: 101 MG/DL (ref 65–99)
HBA1C MFR BLD: 5.3 % (ref 4.8–5.6)
HCT VFR BLD AUTO: 49.3 % (ref 37.5–51)
HDLC SERPL-MCNC: 51 MG/DL (ref 40–60)
HGB BLD-MCNC: 15.4 G/DL (ref 13–17.7)
IMM GRANULOCYTES # BLD AUTO: 0.04 10*3/MM3 (ref 0–0.05)
IMM GRANULOCYTES NFR BLD AUTO: 0.6 % (ref 0–0.5)
LDLC SERPL CALC-MCNC: 90 MG/DL (ref 0–100)
LYMPHOCYTES # BLD AUTO: 2.1 10*3/MM3 (ref 0.7–3.1)
LYMPHOCYTES NFR BLD AUTO: 32.6 % (ref 19.6–45.3)
MCH RBC QN AUTO: 27.7 PG (ref 26.6–33)
MCHC RBC AUTO-ENTMCNC: 31.2 G/DL (ref 31.5–35.7)
MCV RBC AUTO: 88.8 FL (ref 79–97)
MONOCYTES # BLD AUTO: 0.52 10*3/MM3 (ref 0.1–0.9)
MONOCYTES NFR BLD AUTO: 8.1 % (ref 5–12)
NEUTROPHILS # BLD AUTO: 3.07 10*3/MM3 (ref 1.4–7)
NEUTROPHILS NFR BLD AUTO: 47.5 % (ref 42.7–76)
NRBC BLD AUTO-RTO: 0 /100 WBC (ref 0–0)
PLATELET # BLD AUTO: 215 10*3/MM3 (ref 140–450)
POTASSIUM SERPL-SCNC: 4.9 MMOL/L (ref 3.5–5.2)
PROT SERPL-MCNC: 7.1 G/DL (ref 6–8.5)
RBC # BLD AUTO: 5.55 10*6/MM3 (ref 4.14–5.8)
SODIUM SERPL-SCNC: 141 MMOL/L (ref 136–145)
T3FREE SERPL-MCNC: 4 PG/ML (ref 2–4.4)
T4 FREE SERPL-MCNC: 1.03 NG/DL (ref 0.93–1.7)
TRIGL SERPL-MCNC: 315 MG/DL (ref 0–150)
TSH SERPL DL<=0.005 MIU/L-ACNC: 2.35 MIU/ML (ref 0.27–4.2)
URATE SERPL-MCNC: 10.5 MG/DL (ref 3.4–7)
VIT B12 SERPL-MCNC: 299 PG/ML (ref 211–946)
VLDLC SERPL CALC-MCNC: 63 MG/DL (ref 5–40)
WBC # BLD AUTO: 6.45 10*3/MM3 (ref 3.4–10.8)

## 2019-02-26 RX ORDER — AMLODIPINE BESYLATE 10 MG/1
10 TABLET ORAL DAILY
Qty: 90 TABLET | Refills: 0 | Status: SHIPPED | OUTPATIENT
Start: 2019-02-26 | End: 2019-05-27 | Stop reason: SDUPTHER

## 2019-02-26 RX ORDER — ALLOPURINOL 100 MG/1
TABLET ORAL
Qty: 90 TABLET | Refills: 0 | Status: SHIPPED | OUTPATIENT
Start: 2019-02-26 | End: 2019-03-25

## 2019-02-26 RX ORDER — PREDNISONE 20 MG/1
20 TABLET ORAL 2 TIMES DAILY
Qty: 14 TABLET | Refills: 3 | Status: SHIPPED | OUTPATIENT
Start: 2019-02-26 | End: 2020-01-13 | Stop reason: SDUPTHER

## 2019-02-26 RX ORDER — LISINOPRIL 40 MG/1
40 TABLET ORAL DAILY
Qty: 90 TABLET | Refills: 0 | Status: SHIPPED | OUTPATIENT
Start: 2019-02-26 | End: 2019-07-15 | Stop reason: SDUPTHER

## 2019-02-26 RX ORDER — ALLOPURINOL 300 MG/1
300 TABLET ORAL DAILY
Qty: 90 TABLET | Refills: 3 | Status: SHIPPED | OUTPATIENT
Start: 2019-02-26 | End: 2019-10-29 | Stop reason: SDUPTHER

## 2019-02-26 NOTE — PROGRESS NOTES
Spoke to pt, he is wanting to start on gout suppression medication, advised of all results and is following up on 3/25/19.

## 2019-03-25 ENCOUNTER — OFFICE VISIT (OUTPATIENT)
Dept: FAMILY MEDICINE CLINIC | Facility: CLINIC | Age: 44
End: 2019-03-25

## 2019-03-25 VITALS
WEIGHT: 240 LBS | OXYGEN SATURATION: 97 % | HEART RATE: 76 BPM | TEMPERATURE: 97.5 F | RESPIRATION RATE: 16 BRPM | SYSTOLIC BLOOD PRESSURE: 120 MMHG | BODY MASS INDEX: 36.37 KG/M2 | DIASTOLIC BLOOD PRESSURE: 78 MMHG | HEIGHT: 68 IN

## 2019-03-25 DIAGNOSIS — E79.0 ELEVATED BLOOD URIC ACID LEVEL: ICD-10-CM

## 2019-03-25 DIAGNOSIS — R79.89 ABNORMAL CBC: ICD-10-CM

## 2019-03-25 DIAGNOSIS — I10 ESSENTIAL HYPERTENSION: Primary | ICD-10-CM

## 2019-03-25 DIAGNOSIS — E55.9 VITAMIN D DEFICIENCY DISEASE: ICD-10-CM

## 2019-03-25 DIAGNOSIS — E78.2 MIXED HYPERLIPIDEMIA: ICD-10-CM

## 2019-03-25 DIAGNOSIS — Z87.39 HISTORY OF GOUT: ICD-10-CM

## 2019-03-25 PROCEDURE — 99213 OFFICE O/P EST LOW 20 MIN: CPT | Performed by: PHYSICIAN ASSISTANT

## 2019-03-25 RX ORDER — ARIPIPRAZOLE 5 MG/1
5 TABLET ORAL DAILY
COMMUNITY
Start: 2019-03-06 | End: 2019-10-29

## 2019-03-25 NOTE — PATIENT INSTRUCTIONS
Low glycemic index diet  Exercise 30 minutes most days of the week  Make sure you get results on any labs or tests we ordered today  We discussed medications and how to take them as prescribed  Sleep 6-8 hours each night if possible  If you have not signed up for [employer], please activate your code ASAP from your After Visit Summary today    LDL goal <100  LDL goal if heart disease <70  HDL goal >60  Triglyceride goal <150  BP goal =<130/80  Fasting glucose <100

## 2019-03-25 NOTE — PROGRESS NOTES
Subjective   Brandan Higuera is a 43 y.o. male.     History of Present Illness   Brandan Higuera 43 y.o. male who presents today for routine follow up check and medication refills.  he has a history of   Patient Active Problem List   Diagnosis   • Hypertension   • Vitamin D deficiency disease   • Mixed hyperlipidemia   • Acute idiopathic gout of ankle   .  Since the last visit, he has overall felt well.  He has Impaired fasting glucose and will continue close lab follow up to watch for DMII, Hyperlipidemia and working on this with diet and exercise, Vitamin D deficiency and well controlled on medication and labs at goal >30 and HTN---went up on Lisinopril last visit for elevated bp.  he has been compliant with current medications have reviewed them.  The patient denies medication side effects.  He is working on trigs with diet and exercise  Started Rx gout suppression--no recent gout---will get f/u labs  Also watching glucose  Started MVI to boost B12 and folate  Results for orders placed or performed in visit on 02/25/19   Comprehensive metabolic panel   Result Value Ref Range    Glucose 101 (H) 65 - 99 mg/dL    BUN 11 6 - 20 mg/dL    Creatinine 1.02 0.76 - 1.27 mg/dL    eGFR Non African Am 80 >60 mL/min/1.73    eGFR African Am 98 >60 mL/min/1.73    BUN/Creatinine Ratio 10.8 7.0 - 25.0    Sodium 141 136 - 145 mmol/L    Potassium 4.9 3.5 - 5.2 mmol/L    Chloride 102 98 - 107 mmol/L    Total CO2 26.6 22.0 - 29.0 mmol/L    Calcium 9.8 8.6 - 10.5 mg/dL    Total Protein 7.1 6.0 - 8.5 g/dL    Albumin 4.60 3.50 - 5.20 g/dL    Globulin 2.5 gm/dL    A/G Ratio 1.8 g/dL    Total Bilirubin 0.3 0.1 - 1.2 mg/dL    Alkaline Phosphatase 68 39 - 117 U/L    AST (SGOT) 20 1 - 40 U/L    ALT (SGPT) 22 1 - 41 U/L   Lipid panel   Result Value Ref Range    Total Cholesterol 204 (H) 0 - 200 mg/dL    Triglycerides 315 (H) 0 - 150 mg/dL    HDL Cholesterol 51 40 - 60 mg/dL    VLDL Cholesterol 63 (H) 5 - 40 mg/dL    LDL Cholesterol  90 0 -  100 mg/dL   TSH   Result Value Ref Range    TSH 2.350 0.270 - 4.200 mIU/mL   Hemoglobin A1c   Result Value Ref Range    Hemoglobin A1C 5.30 4.80 - 5.60 %   T4, Free   Result Value Ref Range    Free T4 1.03 0.93 - 1.70 ng/dL   T3, Free   Result Value Ref Range    T3, Free 4.0 2.0 - 4.4 pg/mL   Uric Acid   Result Value Ref Range    Uric Acid 10.5 (H) 3.4 - 7.0 mg/dL   Vitamin B12   Result Value Ref Range    Vitamin B-12 299 211 - 946 pg/mL   Folate   Result Value Ref Range    Folate 7.85 4.78 - 24.20 ng/mL   Vitamin D 25 Hydroxy   Result Value Ref Range    25 Hydroxy, Vitamin D 49.3 30.0 - 100.0 ng/ml   CBC and Differential   Result Value Ref Range    WBC 6.45 3.40 - 10.80 10*3/mm3    RBC 5.55 4.14 - 5.80 10*6/mm3    Hemoglobin 15.4 13.0 - 17.7 g/dL    Hematocrit 49.3 37.5 - 51.0 %    MCV 88.8 79.0 - 97.0 fL    MCH 27.7 26.6 - 33.0 pg    MCHC 31.2 (L) 31.5 - 35.7 g/dL    RDW 13.4 12.3 - 15.4 %    Platelets 215 140 - 450 10*3/mm3    Neutrophil Rel % 47.5 42.7 - 76.0 %    Lymphocyte Rel % 32.6 19.6 - 45.3 %    Monocyte Rel % 8.1 5.0 - 12.0 %    Eosinophil Rel % 10.1 (H) 0.3 - 6.2 %    Basophil Rel % 1.1 0.0 - 1.5 %    Neutrophils Absolute 3.07 1.40 - 7.00 10*3/mm3    Lymphocytes Absolute 2.10 0.70 - 3.10 10*3/mm3    Monocytes Absolute 0.52 0.10 - 0.90 10*3/mm3    Eosinophils Absolute 0.65 (H) 0.00 - 0.40 10*3/mm3    Basophils Absolute 0.07 0.00 - 0.20 10*3/mm3    Immature Granulocyte Rel % 0.6 (H) 0.0 - 0.5 %    Immature Grans Absolute 0.04 0.00 - 0.05 10*3/mm3    nRBC 0.0 0.0 - 0.0 /100 WBC         The following portions of the patient's history were reviewed and updated as appropriate: allergies, current medications, past family history, past medical history, past social history, past surgical history and problem list.    Review of Systems   Constitutional: Negative for activity change, appetite change and unexpected weight change.   HENT: Negative for nosebleeds and trouble swallowing.    Eyes: Negative for pain and  visual disturbance.   Respiratory: Negative for chest tightness, shortness of breath and wheezing.    Cardiovascular: Negative for chest pain and palpitations.   Gastrointestinal: Negative for abdominal pain and blood in stool.   Endocrine: Negative.    Genitourinary: Negative for difficulty urinating and hematuria.   Musculoskeletal: Negative for joint swelling.   Skin: Negative for color change and rash.   Allergic/Immunologic: Negative.    Neurological: Negative for syncope and speech difficulty.   Hematological: Negative for adenopathy.   Psychiatric/Behavioral: Negative for agitation and confusion.   All other systems reviewed and are negative.      Objective   Physical Exam   Constitutional: He is oriented to person, place, and time. He appears well-developed and well-nourished. No distress.   HENT:   Head: Normocephalic and atraumatic.   Eyes: Conjunctivae and EOM are normal. Pupils are equal, round, and reactive to light. Right eye exhibits no discharge. Left eye exhibits no discharge. No scleral icterus.   Neck: Normal range of motion. Neck supple. No tracheal deviation present. No thyromegaly present.   Cardiovascular: Normal rate, regular rhythm, normal heart sounds, intact distal pulses and normal pulses. Exam reveals no gallop.   No murmur heard.  Pulmonary/Chest: Effort normal and breath sounds normal. No respiratory distress. He has no wheezes. He has no rales.   Musculoskeletal: Normal range of motion.   Neurological: He is alert and oriented to person, place, and time. He exhibits normal muscle tone. Coordination normal.   Skin: Skin is warm. No rash noted. No erythema. No pallor.   Psychiatric: He has a normal mood and affect. His behavior is normal. Judgment and thought content normal.   Nursing note and vitals reviewed.      Assessment/Plan   Brandan was seen today for hypertension.    Diagnoses and all orders for this visit:    Essential hypertension  -     Glucose, Random; Future  -     Uric  Acid; Future  -     CBC & Differential; Future  -     Lipid Panel; Future    Vitamin D deficiency disease  -     Glucose, Random; Future  -     Uric Acid; Future  -     CBC & Differential; Future  -     Lipid Panel; Future    Mixed hyperlipidemia  -     Glucose, Random; Future  -     Uric Acid; Future  -     CBC & Differential; Future  -     Lipid Panel; Future    History of gout  -     Glucose, Random; Future  -     Uric Acid; Future  -     CBC & Differential; Future  -     Lipid Panel; Future    Abnormal CBC  -     Glucose, Random; Future  -     Uric Acid; Future  -     CBC & Differential; Future  -     Lipid Panel; Future    Elevated blood uric acid level  -     Glucose, Random; Future  -     Uric Acid; Future  -     CBC & Differential; Future  -     Lipid Panel; Future      Watching BP  Working on trigs and glucose with diet  Started Allopurinol and will get uric acid level 3 mos

## 2019-03-27 RX ORDER — ALLOPURINOL 100 MG/1
TABLET ORAL
Qty: 90 TABLET | Refills: 0 | Status: SHIPPED | OUTPATIENT
Start: 2019-03-27 | End: 2019-10-29 | Stop reason: SDUPTHER

## 2019-04-17 RX ORDER — ALLOPURINOL 100 MG/1
TABLET ORAL
Qty: 90 TABLET | Refills: 0 | OUTPATIENT
Start: 2019-04-17

## 2019-05-27 RX ORDER — AMLODIPINE BESYLATE 10 MG/1
10 TABLET ORAL DAILY
Qty: 90 TABLET | Refills: 0 | Status: SHIPPED | OUTPATIENT
Start: 2019-05-27 | End: 2019-09-20 | Stop reason: SDUPTHER

## 2019-06-17 ENCOUNTER — RESULTS ENCOUNTER (OUTPATIENT)
Dept: FAMILY MEDICINE CLINIC | Facility: CLINIC | Age: 44
End: 2019-06-17

## 2019-06-17 DIAGNOSIS — E79.0 ELEVATED BLOOD URIC ACID LEVEL: ICD-10-CM

## 2019-06-17 DIAGNOSIS — E55.9 VITAMIN D DEFICIENCY DISEASE: ICD-10-CM

## 2019-06-17 DIAGNOSIS — I10 ESSENTIAL HYPERTENSION: ICD-10-CM

## 2019-06-17 DIAGNOSIS — R79.89 ABNORMAL CBC: ICD-10-CM

## 2019-06-17 DIAGNOSIS — Z87.39 HISTORY OF GOUT: ICD-10-CM

## 2019-06-17 DIAGNOSIS — E78.2 MIXED HYPERLIPIDEMIA: ICD-10-CM

## 2019-07-15 RX ORDER — LISINOPRIL 40 MG/1
40 TABLET ORAL DAILY
Qty: 90 TABLET | Refills: 0 | Status: SHIPPED | OUTPATIENT
Start: 2019-07-15 | End: 2019-10-29 | Stop reason: SDUPTHER

## 2019-09-20 RX ORDER — AMLODIPINE BESYLATE 10 MG/1
10 TABLET ORAL DAILY
Qty: 30 TABLET | Refills: 0 | Status: SHIPPED | OUTPATIENT
Start: 2019-09-20 | End: 2019-10-29 | Stop reason: SDUPTHER

## 2019-10-29 ENCOUNTER — OFFICE VISIT (OUTPATIENT)
Dept: FAMILY MEDICINE CLINIC | Facility: CLINIC | Age: 44
End: 2019-10-29

## 2019-10-29 VITALS
HEIGHT: 68 IN | DIASTOLIC BLOOD PRESSURE: 78 MMHG | HEART RATE: 88 BPM | OXYGEN SATURATION: 98 % | BODY MASS INDEX: 39.4 KG/M2 | RESPIRATION RATE: 16 BRPM | SYSTOLIC BLOOD PRESSURE: 130 MMHG | WEIGHT: 260 LBS

## 2019-10-29 DIAGNOSIS — E55.9 VITAMIN D DEFICIENCY DISEASE: ICD-10-CM

## 2019-10-29 DIAGNOSIS — I10 ESSENTIAL HYPERTENSION: Primary | ICD-10-CM

## 2019-10-29 DIAGNOSIS — M54.50 LUMBAR PAIN: ICD-10-CM

## 2019-10-29 DIAGNOSIS — M10.079 ACUTE IDIOPATHIC GOUT OF ANKLE, UNSPECIFIED LATERALITY: ICD-10-CM

## 2019-10-29 DIAGNOSIS — Z00.00 LABORATORY EXAMINATION ORDERED AS PART OF A ROUTINE GENERAL MEDICAL EXAMINATION: ICD-10-CM

## 2019-10-29 DIAGNOSIS — E78.2 MIXED HYPERLIPIDEMIA: ICD-10-CM

## 2019-10-29 PROCEDURE — 99214 OFFICE O/P EST MOD 30 MIN: CPT | Performed by: PHYSICIAN ASSISTANT

## 2019-10-29 RX ORDER — AMLODIPINE BESYLATE 10 MG/1
10 TABLET ORAL DAILY
Qty: 90 TABLET | Refills: 1 | Status: SHIPPED | OUTPATIENT
Start: 2019-10-29 | End: 2020-05-20

## 2019-10-29 RX ORDER — LISINOPRIL 40 MG/1
40 TABLET ORAL DAILY
Qty: 90 TABLET | Refills: 1 | Status: SHIPPED | OUTPATIENT
Start: 2019-10-29 | End: 2020-07-12

## 2019-10-29 RX ORDER — ALLOPURINOL 100 MG/1
TABLET ORAL
Qty: 90 TABLET | Refills: 0 | Status: SHIPPED | OUTPATIENT
Start: 2019-10-29 | End: 2019-11-20 | Stop reason: SDUPTHER

## 2019-10-29 RX ORDER — BACLOFEN 10 MG/1
10 TABLET ORAL 3 TIMES DAILY
Qty: 30 TABLET | Refills: 1 | Status: SHIPPED | OUTPATIENT
Start: 2019-10-29 | End: 2020-01-17

## 2019-10-29 RX ORDER — ALLOPURINOL 300 MG/1
300 TABLET ORAL DAILY
Qty: 90 TABLET | Refills: 3 | Status: SHIPPED | OUTPATIENT
Start: 2019-10-29 | End: 2020-01-17 | Stop reason: SDUPTHER

## 2019-10-29 NOTE — PROGRESS NOTES
"Subjective   Brandan Higuera is a 44 y.o. male.     History of Present Illness    Since the last visit, he has overall felt fairly well.  He has Essential Hypertension and well controlled on current medication, Impaired fasting glucose and will monitor labs to watch for DMII, Hyperlipidemia and working on this with diet and exercise and Vitamin D deficiency and labs are at goal >30 ng/mL.  he has been compliant with current medications have reviewed them.  The patient denies medication side effects.  Will refill medications. /78   Pulse 110   Resp 16   Ht 172.7 cm (68\")   Wt 118 kg (260 lb)   SpO2 98%   BMI 39.53 kg/m²     Results for orders placed or performed in visit on 02/25/19   Comprehensive metabolic panel   Result Value Ref Range    Glucose 101 (H) 65 - 99 mg/dL    BUN 11 6 - 20 mg/dL    Creatinine 1.02 0.76 - 1.27 mg/dL    eGFR Non African Am 80 >60 mL/min/1.73    eGFR African Am 98 >60 mL/min/1.73    BUN/Creatinine Ratio 10.8 7.0 - 25.0    Sodium 141 136 - 145 mmol/L    Potassium 4.9 3.5 - 5.2 mmol/L    Chloride 102 98 - 107 mmol/L    Total CO2 26.6 22.0 - 29.0 mmol/L    Calcium 9.8 8.6 - 10.5 mg/dL    Total Protein 7.1 6.0 - 8.5 g/dL    Albumin 4.60 3.50 - 5.20 g/dL    Globulin 2.5 gm/dL    A/G Ratio 1.8 g/dL    Total Bilirubin 0.3 0.1 - 1.2 mg/dL    Alkaline Phosphatase 68 39 - 117 U/L    AST (SGOT) 20 1 - 40 U/L    ALT (SGPT) 22 1 - 41 U/L   Lipid panel   Result Value Ref Range    Total Cholesterol 204 (H) 0 - 200 mg/dL    Triglycerides 315 (H) 0 - 150 mg/dL    HDL Cholesterol 51 40 - 60 mg/dL    VLDL Cholesterol 63 (H) 5 - 40 mg/dL    LDL Cholesterol  90 0 - 100 mg/dL   TSH   Result Value Ref Range    TSH 2.350 0.270 - 4.200 mIU/mL   Hemoglobin A1c   Result Value Ref Range    Hemoglobin A1C 5.30 4.80 - 5.60 %   T4, Free   Result Value Ref Range    Free T4 1.03 0.93 - 1.70 ng/dL   T3, Free   Result Value Ref Range    T3, Free 4.0 2.0 - 4.4 pg/mL   Uric Acid   Result Value Ref Range    Uric " Acid 10.5 (H) 3.4 - 7.0 mg/dL   Vitamin B12   Result Value Ref Range    Vitamin B-12 299 211 - 946 pg/mL   Folate   Result Value Ref Range    Folate 7.85 4.78 - 24.20 ng/mL   Vitamin D 25 Hydroxy   Result Value Ref Range    25 Hydroxy, Vitamin D 49.3 30.0 - 100.0 ng/ml   CBC and Differential   Result Value Ref Range    WBC 6.45 3.40 - 10.80 10*3/mm3    RBC 5.55 4.14 - 5.80 10*6/mm3    Hemoglobin 15.4 13.0 - 17.7 g/dL    Hematocrit 49.3 37.5 - 51.0 %    MCV 88.8 79.0 - 97.0 fL    MCH 27.7 26.6 - 33.0 pg    MCHC 31.2 (L) 31.5 - 35.7 g/dL    RDW 13.4 12.3 - 15.4 %    Platelets 215 140 - 450 10*3/mm3    Neutrophil Rel % 47.5 42.7 - 76.0 %    Lymphocyte Rel % 32.6 19.6 - 45.3 %    Monocyte Rel % 8.1 5.0 - 12.0 %    Eosinophil Rel % 10.1 (H) 0.3 - 6.2 %    Basophil Rel % 1.1 0.0 - 1.5 %    Neutrophils Absolute 3.07 1.40 - 7.00 10*3/mm3    Lymphocytes Absolute 2.10 0.70 - 3.10 10*3/mm3    Monocytes Absolute 0.52 0.10 - 0.90 10*3/mm3    Eosinophils Absolute 0.65 (H) 0.00 - 0.40 10*3/mm3    Basophils Absolute 0.07 0.00 - 0.20 10*3/mm3    Immature Granulocyte Rel % 0.6 (H) 0.0 - 0.5 %    Immature Grans Absolute 0.04 0.00 - 0.05 10*3/mm3    nRBC 0.0 0.0 - 0.0 /100 WBC   left piriformis soreness for few days; ROM pain; moved furniture at home; I will start Rx; occas pain thigh      Recent gout flare  Had one dose pred last PM  Need to start on Allopurinol to suppress gout  F/u on labs; and trigs up last time  Weight up and off Abilify    The following portions of the patient's history were reviewed and updated as appropriate: allergies, current medications, past family history, past medical history, past social history, past surgical history and problem list.    Review of Systems   Constitutional: Negative for activity change, appetite change and unexpected weight change.   HENT: Negative for nosebleeds and trouble swallowing.    Eyes: Negative for pain and visual disturbance.   Respiratory: Negative for chest tightness,  shortness of breath and wheezing.    Cardiovascular: Negative for chest pain and palpitations.   Gastrointestinal: Negative for abdominal pain and blood in stool.   Endocrine: Negative.    Genitourinary: Negative for difficulty urinating and hematuria.   Musculoskeletal: Negative for joint swelling.   Skin: Negative for color change and rash.   Allergic/Immunologic: Negative.    Neurological: Negative for syncope and speech difficulty.   Hematological: Negative for adenopathy.   Psychiatric/Behavioral: Negative for agitation and confusion.   All other systems reviewed and are negative.      Objective   Physical Exam   Constitutional: He is oriented to person, place, and time. He appears well-developed and well-nourished. No distress.   HENT:   Head: Normocephalic and atraumatic.   Eyes: Conjunctivae and EOM are normal. Pupils are equal, round, and reactive to light. Right eye exhibits no discharge. Left eye exhibits no discharge. No scleral icterus.   Neck: Normal range of motion. Neck supple. No tracheal deviation present. No thyromegaly present.   Cardiovascular: Normal rate, regular rhythm, normal heart sounds, intact distal pulses and normal pulses. Exam reveals no gallop.   No murmur heard.  Pulmonary/Chest: Effort normal and breath sounds normal. No respiratory distress. He has no wheezes. He has no rales.   Musculoskeletal: Normal range of motion.   Neurological: He is alert and oriented to person, place, and time. He exhibits normal muscle tone. Coordination normal.   Skin: Skin is warm. No rash noted. No erythema. No pallor.   Psychiatric: He has a normal mood and affect. His behavior is normal. Judgment and thought content normal.   Nursing note and vitals reviewed.      Assessment/Plan   Brandan was seen today for hypertension and hip pain.    Diagnoses and all orders for this visit:    Essential hypertension  -     Comprehensive metabolic panel; Future  -     Lipid panel; Future  -     CBC and  Differential; Future  -     TSH; Future  -     Hemoglobin A1c; Future  -     T3, Free; Future  -     T4, Free; Future  -     Uric Acid; Future  -     Vitamin B12; Future  -     Folate; Future  -     Vitamin D 25 Hydroxy; Future    Vitamin D deficiency disease  -     Comprehensive metabolic panel; Future  -     Lipid panel; Future  -     CBC and Differential; Future  -     TSH; Future  -     Hemoglobin A1c; Future  -     T3, Free; Future  -     T4, Free; Future  -     Uric Acid; Future  -     Vitamin B12; Future  -     Folate; Future  -     Vitamin D 25 Hydroxy; Future    Mixed hyperlipidemia  -     Comprehensive metabolic panel; Future  -     Lipid panel; Future  -     CBC and Differential; Future  -     TSH; Future  -     Hemoglobin A1c; Future  -     T3, Free; Future  -     T4, Free; Future  -     Uric Acid; Future  -     Vitamin B12; Future  -     Folate; Future  -     Vitamin D 25 Hydroxy; Future    Acute idiopathic gout of ankle, unspecified laterality  -     Comprehensive metabolic panel; Future  -     Lipid panel; Future  -     CBC and Differential; Future  -     TSH; Future  -     Hemoglobin A1c; Future  -     T3, Free; Future  -     T4, Free; Future  -     Uric Acid; Future  -     Vitamin B12; Future  -     Folate; Future  -     Vitamin D 25 Hydroxy; Future    Laboratory examination ordered as part of a routine general medical examination  -     Comprehensive metabolic panel; Future  -     Lipid panel; Future  -     CBC and Differential; Future  -     TSH; Future  -     Hemoglobin A1c; Future  -     T3, Free; Future  -     T4, Free; Future  -     Uric Acid; Future  -     Vitamin B12; Future  -     Folate; Future  -     Vitamin D 25 Hydroxy; Future    Other orders  -     allopurinol (ZYLOPRIM) 100 MG tablet; 100mg PO daily for 1 wk then 200mg PO daily X 1 wk, then 300mg po daily  -     allopurinol (ZYLOPRIM) 300 MG tablet; Take 1 tablet by mouth Daily. For gout suppression  -     amLODIPine (NORVASC) 10 MG  tablet; Take 1 tablet by mouth Daily. For BP  -     lisinopril (PRINIVIL,ZESTRIL) 40 MG tablet; Take 1 tablet by mouth Daily. New dose for BP          Plan, Brandan Higuera, was seen today.  he was seen for HTN and continue medication, Imparied fasting glucose and plan follow up labs, diet, and exercise, Hyperlipidemia and will work on this with diet and exercise and Vitamin D deficiency and will update labs .  Try Baclofen for lumbar pain

## 2019-10-29 NOTE — PATIENT INSTRUCTIONS

## 2019-11-07 ENCOUNTER — TREATMENT (OUTPATIENT)
Dept: PHYSICAL THERAPY | Facility: CLINIC | Age: 44
End: 2019-11-07

## 2019-11-07 ENCOUNTER — TRANSCRIBE ORDERS (OUTPATIENT)
Dept: PHYSICAL THERAPY | Facility: CLINIC | Age: 44
End: 2019-11-07

## 2019-11-07 DIAGNOSIS — S86.912D STRAIN OF LEFT KNEE, SUBSEQUENT ENCOUNTER: Primary | ICD-10-CM

## 2019-11-07 DIAGNOSIS — M79.652 LEFT THIGH PAIN: ICD-10-CM

## 2019-11-07 DIAGNOSIS — M79.605 LEFT LEG PAIN: Primary | ICD-10-CM

## 2019-11-07 PROCEDURE — 97161 PT EVAL LOW COMPLEX 20 MIN: CPT | Performed by: PHYSICAL THERAPIST

## 2019-11-07 PROCEDURE — 97014 ELECTRIC STIMULATION THERAPY: CPT | Performed by: PHYSICAL THERAPIST

## 2019-11-07 PROCEDURE — 97110 THERAPEUTIC EXERCISES: CPT | Performed by: PHYSICAL THERAPIST

## 2019-11-07 NOTE — PROGRESS NOTES
Physical Therapy Initial Evaluation and Plan of Care      Subjective Evaluation    History of Present Illness  Date of onset: 10/25/2019  Mechanism of injury: Patient was pulling a beltran that was in a hole.  Patient was pulling toward his left.      Patient Occupation: [employer]   Precautions and Work Restrictions: light dutyPain  Current pain ratin  At worst pain rating: 10  Location: left lateral knee, around the patella and posterior knee  Quality: throbbing  Relieving factors: heat  Aggravating factors: movement  Progression: worsening             Objective       Observations     Additional Observation Details  Patient ambulates with bilateral crutches WBAT on the left LE.    Palpation     Additional Palpation Details  TTP to the left lateral joint line.    Active Range of Motion   Left Knee   Flexion: 68 degrees with pain  Extension: Left knee active extension: -22. with pain    Strength/Myotome Testing     Left Knee   Flexion: 4  Extension: 4-    Tests     Left Knee   Positive varus stress test at 30 degrees.          Assessment & Plan     Assessment  Impairments: abnormal gait, abnormal or restricted ROM, activity intolerance, impaired physical strength, lacks appropriate home exercise program and pain with function  Assessment details: Patient presents with c/o pain, TTP, limited AROM, decreased strength and an antalgic gait pattern which is limiting his ability to perform full job duties and ADL'S.  Barriers to therapy: none  Prognosis: good  Prognosis details: STG's  1)  Independent with HEP  2)  Decrease pain by 50% or more  3)  AROM WNL for the left knee without pain  4)  Patient to ambulate without the use of crutches    LTG's  1)  Independent with HEP progression  2)  Decrease pain by 75% or more  3)  Increase strength for the left knee to 4+/5  4)  Negative special testing  5)  No TTP present  6)  Patient to push/pull up to 100 lbs  7)  Patient to return to ADL'S without limitations       Plan  Therapy  options: will be seen for skilled physical therapy services  Planned modality interventions: TENS and cryotherapy  Planned therapy interventions: strengthening, stretching, therapeutic activities, home exercise program and neuromuscular re-education  Treatment plan discussed with: patient        Manual Therapy:    0     mins  97508;  Therapeutic Exercise:    14     mins  24312;     Neuromuscular Haleigh:    0    mins  11996;    Therapeutic Activity:     0     mins  94778;     Gait Trainin     mins  35403;     Ultrasound:     0     mins  19346;    Work Hardening           0      mins 65861  Iontophoresis               0   mins 86791    Timed Treatment:   14   mins   Total Treatment:     40   mins    PT SIGNATURE: Jhoan Mirza, PT   DATE TREATMENT INITIATED: 2019    Initial Certification  Certification Period: 2020  I certify that the therapy services are furnished while this patient is under my care.  The services outlined above are required by this patient, and will be reviewed every 90 days.     PHYSICIAN:       DATE:     Please sign and return via fax to [phone].. Thank you, [facility].

## 2019-11-11 ENCOUNTER — TREATMENT (OUTPATIENT)
Dept: PHYSICAL THERAPY | Facility: CLINIC | Age: 44
End: 2019-11-11

## 2019-11-11 DIAGNOSIS — S86.912D STRAIN OF LEFT KNEE, SUBSEQUENT ENCOUNTER: Primary | ICD-10-CM

## 2019-11-11 PROCEDURE — 97014 ELECTRIC STIMULATION THERAPY: CPT | Performed by: PHYSICAL THERAPIST

## 2019-11-11 PROCEDURE — 97110 THERAPEUTIC EXERCISES: CPT | Performed by: PHYSICAL THERAPIST

## 2019-11-11 NOTE — PROGRESS NOTES
Physical Therapy Daily Progress Note      Visit # 2      Subjective Evaluation    History of Present Illness    Subjective comment: Pt reports that his knee is doing a lot better.  He has been off crutches since his last visit.       Objective   See Exercise, Manual, and Modality Logs for complete treatment.   *introduced SLR/Hip Abd/ LAQ, Hip add    Assessment & Plan     Assessment  Assessment details: Pt completed treatment with no provocation of pain in (L) knee.  He tolerated introduction of SLR/Hip Abd/Add, and LAQ without complaint.                     Manual Therapy:    0     mins  73212;  Therapeutic Exercise:    23     mins  16646;     Neuromuscular Haleigh:    0    mins  25513;    Therapeutic Activity:     0     mins  48216;     Gait Trainin     mins  08573;     Ultrasound:     0     mins  93758;    Work Hardening           0      mins 96344  Iontophoresis               0   mins 78137    Timed Treatment:   23   mins   Total Treatment:     38   mins    Etienne Valerio PTA  Physical Therapist

## 2019-11-12 ENCOUNTER — TREATMENT (OUTPATIENT)
Dept: PHYSICAL THERAPY | Facility: CLINIC | Age: 44
End: 2019-11-12

## 2019-11-12 DIAGNOSIS — S86.912D STRAIN OF LEFT KNEE, SUBSEQUENT ENCOUNTER: Primary | ICD-10-CM

## 2019-11-12 PROCEDURE — 97110 THERAPEUTIC EXERCISES: CPT | Performed by: PHYSICAL THERAPIST

## 2019-11-12 PROCEDURE — 97014 ELECTRIC STIMULATION THERAPY: CPT | Performed by: PHYSICAL THERAPIST

## 2019-11-12 NOTE — PROGRESS NOTES
Physical Therapy Daily Progress Note      Visit # 3      Subjective Evaluation    History of Present Illness    Subjective comment: Pt reports that his knee has been very stiff since last treatment.       Objective       Active Range of Motion   Left Knee   Flexion: 105 degrees with pain     See Exercise, Manual, and Modality Logs for complete treatment.       Assessment & Plan     Assessment  Assessment details: Pt has been seen for 3 treatment.  He continues to walk with an antalgic gait.  Pt completed todays treatment with visible signs of med pain. He will benefit from continued PT to improve his ROM and strengthen his (L) LE further.                     Manual Therapy:    0     mins  36037;  Therapeutic Exercise:    35     mins  18210;     Neuromuscular Haleigh:    0    mins  45223;    Therapeutic Activity:     0     mins  33778;     Gait Trainin     mins  74017;     Ultrasound:     0     mins  14377;    Work Hardening           0      mins 25478  Iontophoresis               0   mins 30267    Timed Treatment:   35   mins   Total Treatment:     50   mins    Etienne Valerio PTA  Physical Therapist

## 2019-11-20 RX ORDER — ALLOPURINOL 100 MG/1
TABLET ORAL
Qty: 90 TABLET | Refills: 0 | Status: SHIPPED | OUTPATIENT
Start: 2019-11-20 | End: 2020-06-20

## 2019-12-16 ENCOUNTER — OFFICE VISIT (OUTPATIENT)
Dept: ORTHOPEDIC SURGERY | Facility: CLINIC | Age: 44
End: 2019-12-16

## 2019-12-16 VITALS — BODY MASS INDEX: 40.28 KG/M2 | WEIGHT: 265.8 LBS | HEIGHT: 68 IN | TEMPERATURE: 96.2 F

## 2019-12-16 DIAGNOSIS — M25.562 ACUTE PAIN OF LEFT KNEE: Primary | ICD-10-CM

## 2019-12-16 PROCEDURE — 99202 OFFICE O/P NEW SF 15 MIN: CPT | Performed by: NURSE PRACTITIONER

## 2019-12-16 RX ORDER — IBUPROFEN 800 MG/1
TABLET ORAL
COMMUNITY
Start: 2019-10-31

## 2019-12-16 NOTE — PROGRESS NOTES
Patient: Brandan Higuera  YOB: 1975 44 y.o. male  Medical Record Number: [id number]    Chief Complaints:   Chief Complaint   Patient presents with   • Left Knee - Pain   • Establish Care       History of Present Illness:Brandan Higuera is a 44 y.o. male who presents as a new patient both myself as well as to the practice with complaints of left knee pain.  Apparently the patient had an incident at work on October 25 in which he was lifting several heavy packages had acute onset of pain in his lower back and left knee.  He has been on light duty since.  He reports that the pain has completely gone away over the last couple weeks denies any pain or issues at this point.    Allergies:   Allergies   Allergen Reactions   • Penicillins Swelling       Medications:   Current Outpatient Medications   Medication Sig Dispense Refill   • allopurinol (ZYLOPRIM) 100 MG tablet TAKE 1 TABLET BY MOUTH X 1 WEEK, 2 TABLETS X 1 WEEK THEN TAKE 3 TABLETS EVERY DAY 90 tablet 0   • allopurinol (ZYLOPRIM) 300 MG tablet Take 1 tablet by mouth Daily. For gout suppression 90 tablet 3   • amLODIPine (NORVASC) 10 MG tablet Take 1 tablet by mouth Daily. For BP 90 tablet 1   • baclofen (LIORESAL) 10 MG tablet Take 1 tablet by mouth 3 (Three) Times a Day. PRN spasms 30 tablet 1   • escitalopram (LEXAPRO) 10 MG tablet TAKE 1 TABLET BY MOUTH DAILY  1   • ibuprofen (ADVIL,MOTRIN) 800 MG tablet      • lamoTRIgine (LaMICtal) 100 MG tablet TAKE 1 TABLET BY MOUTH AT BEDTIME  1   • lisinopril (PRINIVIL,ZESTRIL) 40 MG tablet Take 1 tablet by mouth Daily. New dose for BP 90 tablet 1   • predniSONE (DELTASONE) 20 MG tablet Take 1 tablet by mouth 2 (Two) Times a Day. One PO BID with food for gout flare PRN 14 tablet 3     No current facility-administered medications for this visit.          The following portions of the patient's history were reviewed and updated as appropriate: allergies, current medications, past family history, past medical  "history, past social history, past surgical history and problem list.    Review of Systems:   A 14 point review of systems was performed. All systems negative except pertinent positives/negative listed in HPI above    Physical Exam:   Vitals:    12/16/19 0807   Temp: 96.2 °F (35.7 °C)   Weight: 121 kg (265 lb 12.8 oz)   Height: 172.7 cm (68\")       General: A and O x 3, ASA, NAD    SCLERA:    Normal    DENTITION:   Normal  Skin clear no unusual lesions noted  Left knee patient has no appreciable effusion full range of motion of the knee and back itself with no instability calf is soft and nontender    Radiology:  Xrays previous x-rays of the left knee were reviewed the patient does have mild arthritic changes noted    Assessment/Plan:  Left knee pain and low back strain resolved    This point the patient again denies any pain or problems.  I will clear him to return to work tomorrow full duty no restrictions otherwise we will see him back as needed  "

## 2020-01-13 ENCOUNTER — TELEPHONE (OUTPATIENT)
Dept: FAMILY MEDICINE CLINIC | Facility: CLINIC | Age: 45
End: 2020-01-13

## 2020-01-13 RX ORDER — PREDNISONE 20 MG/1
20 TABLET ORAL 2 TIMES DAILY
Qty: 14 TABLET | Refills: 3 | Status: SHIPPED | OUTPATIENT
Start: 2020-01-13 | End: 2021-12-06 | Stop reason: SDDI

## 2020-01-13 RX ORDER — PREDNISONE 10 MG/1
TABLET ORAL
Qty: 28 TABLET | Refills: 3 | Status: SHIPPED | OUTPATIENT
Start: 2020-01-13 | End: 2020-01-17 | Stop reason: ALTCHOICE

## 2020-01-17 ENCOUNTER — OFFICE VISIT (OUTPATIENT)
Dept: FAMILY MEDICINE CLINIC | Facility: CLINIC | Age: 45
End: 2020-01-17

## 2020-01-17 VITALS
TEMPERATURE: 98 F | WEIGHT: 263 LBS | RESPIRATION RATE: 16 BRPM | DIASTOLIC BLOOD PRESSURE: 93 MMHG | BODY MASS INDEX: 39.86 KG/M2 | HEIGHT: 68 IN | SYSTOLIC BLOOD PRESSURE: 139 MMHG | OXYGEN SATURATION: 98 %

## 2020-01-17 DIAGNOSIS — M10.9 ACUTE GOUT OF ANKLE, UNSPECIFIED CAUSE, UNSPECIFIED LATERALITY: Primary | ICD-10-CM

## 2020-01-17 PROCEDURE — 99212 OFFICE O/P EST SF 10 MIN: CPT | Performed by: FAMILY MEDICINE

## 2020-01-17 NOTE — PATIENT INSTRUCTIONS
Gout    Gout is painful swelling of your joints. Gout is a type of arthritis. It is caused by having too much uric acid in your body. Uric acid is a chemical that is made when your body breaks down substances called purines. If your body has too much uric acid, sharp crystals can form and build up in your joints. This causes pain and swelling.  Gout attacks can happen quickly and be very painful (acute gout). Over time, the attacks can affect more joints and happen more often (chronic gout).  What are the causes?  · Too much uric acid in your blood. This can happen because:  ? Your kidneys do not remove enough uric acid from your blood.  ? Your body makes too much uric acid.  ? You eat too many foods that are high in purines. These foods include organ meats, some seafood, and beer.  · Trauma or stress.  What increases the risk?  · Having a family history of gout.  · Being male and middle-aged.  · Being female and having gone through menopause.  · Being very overweight (obese).  · Drinking alcohol, especially beer.  · Not having enough water in the body (being dehydrated).  · Losing weight too quickly.  · Having an organ transplant.  · Having lead poisoning.  · Taking certain medicines.  · Having kidney disease.  · Having a skin condition called psoriasis.  What are the signs or symptoms?  An attack of acute gout usually happens in just one joint. The most common place is the big toe. Attacks often start at night. Other joints that may be affected include joints of the feet, ankle, knee, fingers, wrist, or elbow. Symptoms of an attack may include:  · Very bad pain.  · Warmth.  · Swelling.  · Stiffness.  · Shiny, red, or purple skin.  · Tenderness. The affected joint may be very painful to touch.  · Chills and fever.  Chronic gout may cause symptoms more often. More joints may be involved. You may also have white or yellow lumps (tophi) on your hands or feet or in other areas near your joints.  How is this  treated?  · Treatment for this condition has two phases: treating an acute attack and preventing future attacks.  · Acute gout treatment may include:  ? NSAIDs.  ? Steroids. These are taken by mouth or injected into a joint.  ? Colchicine. This medicine relieves pain and swelling. It can be given by mouth or through an IV tube.  · Preventive treatment may include:  ? Taking small doses of NSAIDs or colchicine daily.  ? Using a medicine that reduces uric acid levels in your blood.  ? Making changes to your diet. You may need to see a food expert (dietitian) about what to eat and drink to prevent gout.  Follow these instructions at home:  During a gout attack    · If told, put ice on the painful area:  ? Put ice in a plastic bag.  ? Place a towel between your skin and the bag.  ? Leave the ice on for 20 minutes, 2-3 times a day.  · Raise (elevate) the painful joint above the level of your heart as often as you can.  · Rest the joint as much as possible. If the joint is in your leg, you may be given crutches.  · Follow instructions from your doctor about what you cannot eat or drink.  Avoiding future gout attacks  · Eat a low-purine diet. Avoid foods and drinks such as:  ? Liver.  ? Kidney.  ? Anchovies.  ? Asparagus.  ? Herring.  ? Mushrooms.  ? Mussels.  ? Beer.  · Stay at a healthy weight. If you want to lose weight, talk with your doctor. Do not lose weight too fast.  · Start or continue an exercise plan as told by your doctor.  Eating and drinking  · Drink enough fluids to keep your pee (urine) pale yellow.  · If you drink alcohol:  ? Limit how much you use to:  § 0-1 drink a day for women.  § 0-2 drinks a day for men.  ? Be aware of how much alcohol is in your drink. In the [place] one drink equals one 12 oz bottle of beer (355 mL), one 5 oz glass of wine (148 mL), or one 1½ oz glass of hard liquor (44 mL).  General instructions  · Take over-the-counter and prescription medicines only as told by your doctor.  · Do  not drive or use heavy machinery while taking prescription pain medicine.  · Return to your normal activities as told by your doctor. Ask your doctor what activities are safe for you.  · Keep all follow-up visits as told by your doctor. This is important.  Contact a doctor if:  · You have another gout attack.  · You still have symptoms of a gout attack after 10 days of treatment.  · You have problems (side effects) because of your medicines.  · You have chills or a fever.  · You have burning pain when you pee (urinate).  · You have pain in your lower back or belly.  Get help right away if:  · You have very bad pain.  · Your pain cannot be controlled.  · You cannot pee.  Summary  · Gout is painful swelling of the joints.  · The most common site of pain is the big toe, but it can affect other joints.  · Medicines and avoiding some foods can help to prevent and treat gout attacks.  This information is not intended to replace advice given to you by your health care provider. Make sure you discuss any questions you have with your health care provider.  Document Released: 09/26/2009 Document Revised: 07/10/2019 Document Reviewed: 07/10/2019  [employer] Interactive Patient Education © 2019 [employer]

## 2020-01-17 NOTE — PROGRESS NOTES
Subjective   Chief Complaint:   Chief Complaint   Patient presents with   • Gout in Knees, ankles and feet         History of Present Illness comes in today for an acute attack of gout.  Particular gout attack started a week ago lately has been having more severe and worse attacks of gout basically said he was bedridden.  At present time he is got gout apparently in both knees both ankles and both feet.  And basically was in bed for 3 days could not could not get out of bed.  Actually had gout for 2 years total.  Is never seen a rheumatologist though at present time he is got pain in both knees both ankles both feet and he is on 20 of prednisone per day and I am going to talk to Dr. Maru Diego and see if I am getting an to see her today he is looks like he needs to see Dr. Murguia to get started on a different line of treatment.  I reviewed the drugs that he takes but at the present time he is not on prednisone or allopurinol at present.  Got gout basically in both feet both ankles both knees.  Looked at his past labs.  Again joints involve now or feet ankles and kneesi am going to refer to see dr connolly.  I called patient.          Brandan Higuera 44 y.o. male who presents today for Gout in Both knees, ankles and feet.    ICD-10-CM ICD-9-CM   1. Acute gout of ankle, unspecified cause, unspecified laterality M10.9 274.01        he has a problem list of   Patient Active Problem List   Diagnosis   • Hypertension   • Vitamin D deficiency disease   • Mixed hyperlipidemia   • Acute idiopathic gout of ankle   • Acute gout of ankle   .    he has been compliant with   Current Outpatient Medications:   •  amLODIPine (NORVASC) 10 MG tablet, Take 1 tablet by mouth Daily. For BP, Disp: 90 tablet, Rfl: 1  •  escitalopram (LEXAPRO) 10 MG tablet, TAKE 1 TABLET BY MOUTH DAILY, Disp: , Rfl: 1  •  ibuprofen (ADVIL,MOTRIN) 800 MG tablet, , Disp: , Rfl:   •  lamoTRIgine (LaMICtal) 100 MG tablet, TAKE 1 TABLET BY MOUTH AT BEDTIME, Disp:  ", Rfl: 1  •  lisinopril (PRINIVIL,ZESTRIL) 40 MG tablet, Take 1 tablet by mouth Daily. New dose for BP, Disp: 90 tablet, Rfl: 1  •  predniSONE (DELTASONE) 20 MG tablet, Take 1 tablet by mouth 2 (Two) Times a Day. One PO BID with food for gout flare PRN, Disp: 14 tablet, Rfl: 3  •  allopurinol (ZYLOPRIM) 100 MG tablet, TAKE 1 TABLET BY MOUTH X 1 WEEK, 2 TABLETS X 1 WEEK THEN TAKE 3 TABLETS EVERY DAY, Disp: 90 tablet, Rfl: 0.  he denies medication side effects.        /93   Temp 98 °F (36.7 °C)   Resp 16   Ht 172.7 cm (68\")   Wt 119 kg (263 lb)   SpO2 98%   BMI 39.99 kg/m²     Results for orders placed or performed in visit on 02/25/19   Comprehensive metabolic panel   Result Value Ref Range    Glucose 101 (H) 65 - 99 mg/dL    BUN 11 6 - 20 mg/dL    Creatinine 1.02 0.76 - 1.27 mg/dL    eGFR Non African Am 80 >60 mL/min/1.73    eGFR African Am 98 >60 mL/min/1.73    BUN/Creatinine Ratio 10.8 7.0 - 25.0    Sodium 141 136 - 145 mmol/L    Potassium 4.9 3.5 - 5.2 mmol/L    Chloride 102 98 - 107 mmol/L    Total CO2 26.6 22.0 - 29.0 mmol/L    Calcium 9.8 8.6 - 10.5 mg/dL    Total Protein 7.1 6.0 - 8.5 g/dL    Albumin 4.60 3.50 - 5.20 g/dL    Globulin 2.5 gm/dL    A/G Ratio 1.8 g/dL    Total Bilirubin 0.3 0.1 - 1.2 mg/dL    Alkaline Phosphatase 68 39 - 117 U/L    AST (SGOT) 20 1 - 40 U/L    ALT (SGPT) 22 1 - 41 U/L   Lipid panel   Result Value Ref Range    Total Cholesterol 204 (H) 0 - 200 mg/dL    Triglycerides 315 (H) 0 - 150 mg/dL    HDL Cholesterol 51 40 - 60 mg/dL    VLDL Cholesterol 63 (H) 5 - 40 mg/dL    LDL Cholesterol  90 0 - 100 mg/dL   TSH   Result Value Ref Range    TSH 2.350 0.270 - 4.200 mIU/mL   Hemoglobin A1c   Result Value Ref Range    Hemoglobin A1C 5.30 4.80 - 5.60 %   T4, Free   Result Value Ref Range    Free T4 1.03 0.93 - 1.70 ng/dL   T3, Free   Result Value Ref Range    T3, Free 4.0 2.0 - 4.4 pg/mL   Uric Acid   Result Value Ref Range    Uric Acid 10.5 (H) 3.4 - 7.0 mg/dL   Vitamin B12 "   Result Value Ref Range    Vitamin B-12 299 211 - 946 pg/mL   Folate   Result Value Ref Range    Folate 7.85 4.78 - 24.20 ng/mL   Vitamin D 25 Hydroxy   Result Value Ref Range    25 Hydroxy, Vitamin D 49.3 30.0 - 100.0 ng/ml   CBC and Differential   Result Value Ref Range    WBC 6.45 3.40 - 10.80 10*3/mm3    RBC 5.55 4.14 - 5.80 10*6/mm3    Hemoglobin 15.4 13.0 - 17.7 g/dL    Hematocrit 49.3 37.5 - 51.0 %    MCV 88.8 79.0 - 97.0 fL    MCH 27.7 26.6 - 33.0 pg    MCHC 31.2 (L) 31.5 - 35.7 g/dL    RDW 13.4 12.3 - 15.4 %    Platelets 215 140 - 450 10*3/mm3    Neutrophil Rel % 47.5 42.7 - 76.0 %    Lymphocyte Rel % 32.6 19.6 - 45.3 %    Monocyte Rel % 8.1 5.0 - 12.0 %    Eosinophil Rel % 10.1 (H) 0.3 - 6.2 %    Basophil Rel % 1.1 0.0 - 1.5 %    Neutrophils Absolute 3.07 1.40 - 7.00 10*3/mm3    Lymphocytes Absolute 2.10 0.70 - 3.10 10*3/mm3    Monocytes Absolute 0.52 0.10 - 0.90 10*3/mm3    Eosinophils Absolute 0.65 (H) 0.00 - 0.40 10*3/mm3    Basophils Absolute 0.07 0.00 - 0.20 10*3/mm3    Immature Granulocyte Rel % 0.6 (H) 0.0 - 0.5 %    Immature Grans Absolute 0.04 0.00 - 0.05 10*3/mm3    nRBC 0.0 0.0 - 0.0 /100 WBC       The following portions of the patient's history were reviewed and updated as appropriate: allergies, current medications, past family history, past medical history, past social history, past surgical history and problem list.      he has a history of   Patient Active Problem List   Diagnosis   • Hypertension   • Vitamin D deficiency disease   • Mixed hyperlipidemia   • Acute idiopathic gout of ankle   • Acute gout of ankle       Review of Systems   Musculoskeletal: Positive for arthralgias, joint swelling and myalgias.        Gout attacks now are located mostly in the feet ankles and knees   Neurological: Negative.        Objective   Physical Exam   Constitutional: He is oriented to person, place, and time.   Musculoskeletal:   Gout attack in both knees both ankles both feet with swelling and pain    Neurological: He is alert and oriented to person, place, and time.   Skin: Skin is warm and dry. No rash noted.   Psychiatric: He has a normal mood and affect.       Assessment/Plan   Brandan was seen today for gout in knees, ankles and feet.    Diagnoses and all orders for this visit:    Acute gout of ankle, unspecified cause, unspecified laterality  -     Ambulatory Referral to Rheumatology     refer to dr connolly

## 2020-02-01 ENCOUNTER — RESULTS ENCOUNTER (OUTPATIENT)
Dept: FAMILY MEDICINE CLINIC | Facility: CLINIC | Age: 45
End: 2020-02-01

## 2020-02-01 DIAGNOSIS — Z00.00 LABORATORY EXAMINATION ORDERED AS PART OF A ROUTINE GENERAL MEDICAL EXAMINATION: ICD-10-CM

## 2020-02-01 DIAGNOSIS — E78.2 MIXED HYPERLIPIDEMIA: ICD-10-CM

## 2020-02-01 DIAGNOSIS — I10 ESSENTIAL HYPERTENSION: ICD-10-CM

## 2020-02-01 DIAGNOSIS — E55.9 VITAMIN D DEFICIENCY DISEASE: ICD-10-CM

## 2020-02-01 DIAGNOSIS — M10.079 ACUTE IDIOPATHIC GOUT OF ANKLE, UNSPECIFIED LATERALITY: ICD-10-CM

## 2020-04-16 RX ORDER — ALLOPURINOL 300 MG/1
300 TABLET ORAL DAILY
Qty: 90 TABLET | Refills: 3 | OUTPATIENT
Start: 2020-04-16

## 2020-04-20 RX ORDER — ALLOPURINOL 300 MG/1
300 TABLET ORAL DAILY
Qty: 90 TABLET | Refills: 3 | OUTPATIENT
Start: 2020-04-20

## 2020-05-20 RX ORDER — AMLODIPINE BESYLATE 10 MG/1
10 TABLET ORAL DAILY
Qty: 30 TABLET | Refills: 0 | Status: SHIPPED | OUTPATIENT
Start: 2020-05-20 | End: 2020-06-16

## 2020-05-26 RX ORDER — ALLOPURINOL 300 MG/1
300 TABLET ORAL DAILY
Qty: 90 TABLET | Refills: 3 | OUTPATIENT
Start: 2020-05-26

## 2020-06-16 RX ORDER — AMLODIPINE BESYLATE 10 MG/1
10 TABLET ORAL DAILY
Qty: 15 TABLET | Refills: 0 | Status: SHIPPED | OUTPATIENT
Start: 2020-06-16 | End: 2020-09-10

## 2020-06-18 RX ORDER — ALLOPURINOL 300 MG/1
300 TABLET ORAL DAILY
Qty: 90 TABLET | Refills: 3 | OUTPATIENT
Start: 2020-06-18

## 2020-06-20 RX ORDER — ALLOPURINOL 300 MG/1
300 TABLET ORAL DAILY
Qty: 90 TABLET | Refills: 3 | Status: SHIPPED | OUTPATIENT
Start: 2020-06-20 | End: 2021-12-06 | Stop reason: SDUPTHER

## 2020-06-25 RX ORDER — AMLODIPINE BESYLATE 10 MG/1
TABLET ORAL
Qty: 15 TABLET | Refills: 0 | OUTPATIENT
Start: 2020-06-25

## 2020-07-12 RX ORDER — LISINOPRIL 40 MG/1
40 TABLET ORAL DAILY
Qty: 30 TABLET | Refills: 0 | Status: SHIPPED | OUTPATIENT
Start: 2020-07-12 | End: 2020-09-22

## 2020-07-29 ENCOUNTER — TELEPHONE (OUTPATIENT)
Dept: FAMILY MEDICINE CLINIC | Facility: CLINIC | Age: 45
End: 2020-07-29

## 2020-09-10 RX ORDER — AMLODIPINE BESYLATE 10 MG/1
TABLET ORAL
Qty: 15 TABLET | Refills: 0 | Status: SHIPPED | OUTPATIENT
Start: 2020-09-10 | End: 2020-09-18

## 2020-09-18 RX ORDER — AMLODIPINE BESYLATE 10 MG/1
TABLET ORAL
Qty: 15 TABLET | Refills: 0 | Status: SHIPPED | OUTPATIENT
Start: 2020-09-18 | End: 2020-10-02

## 2020-09-22 RX ORDER — LISINOPRIL 40 MG/1
TABLET ORAL
Qty: 15 TABLET | Refills: 0 | Status: SHIPPED | OUTPATIENT
Start: 2020-09-22 | End: 2020-10-02

## 2020-10-02 RX ORDER — AMLODIPINE BESYLATE 10 MG/1
TABLET ORAL
Qty: 15 TABLET | Refills: 0 | Status: SHIPPED | OUTPATIENT
Start: 2020-10-02 | End: 2020-12-23 | Stop reason: SDUPTHER

## 2020-10-02 RX ORDER — LISINOPRIL 40 MG/1
TABLET ORAL
Qty: 15 TABLET | Refills: 0 | Status: SHIPPED | OUTPATIENT
Start: 2020-10-02 | End: 2020-12-23 | Stop reason: SDUPTHER

## 2020-12-23 ENCOUNTER — TELEMEDICINE (OUTPATIENT)
Dept: FAMILY MEDICINE CLINIC | Facility: CLINIC | Age: 45
End: 2020-12-23

## 2020-12-23 DIAGNOSIS — E55.9 VITAMIN D DEFICIENCY DISEASE: ICD-10-CM

## 2020-12-23 DIAGNOSIS — I10 ESSENTIAL HYPERTENSION: Primary | ICD-10-CM

## 2020-12-23 DIAGNOSIS — E78.2 MIXED HYPERLIPIDEMIA: ICD-10-CM

## 2020-12-23 DIAGNOSIS — M10.079 ACUTE IDIOPATHIC GOUT OF ANKLE, UNSPECIFIED LATERALITY: ICD-10-CM

## 2020-12-23 PROCEDURE — 99442 PR PHYS/QHP TELEPHONE EVALUATION 11-20 MIN: CPT | Performed by: PHYSICIAN ASSISTANT

## 2020-12-23 RX ORDER — COLCHICINE 0.6 MG/1
TABLET ORAL
COMMUNITY
Start: 2020-12-21

## 2020-12-23 RX ORDER — LISINOPRIL 40 MG/1
40 TABLET ORAL DAILY
Qty: 90 TABLET | Refills: 1 | Status: SHIPPED | OUTPATIENT
Start: 2020-12-23 | End: 2021-07-07

## 2020-12-23 RX ORDER — AMLODIPINE BESYLATE 10 MG/1
10 TABLET ORAL DAILY
Qty: 90 TABLET | Refills: 1 | Status: SHIPPED | OUTPATIENT
Start: 2020-12-23 | End: 2021-07-07

## 2020-12-23 NOTE — PATIENT INSTRUCTIONS
"Hypertension, Adult  High blood pressure (hypertension) is when the force of blood pumping through the arteries is too strong. The arteries are the blood vessels that carry blood from the heart throughout the body. Hypertension forces the heart to work harder to pump blood and may cause arteries to become narrow or stiff. Untreated or uncontrolled hypertension can cause a heart attack, heart failure, a stroke, kidney disease, and other problems.  A blood pressure reading consists of a higher number over a lower number. Ideally, your blood pressure should be below 120/80. The first (\"top\") number is called the systolic pressure. It is a measure of the pressure in your arteries as your heart beats. The second (\"bottom\") number is called the diastolic pressure. It is a measure of the pressure in your arteries as the heart relaxes.  What are the causes?  The exact cause of this condition is not known. There are some conditions that result in or are related to high blood pressure.  What increases the risk?  Some risk factors for high blood pressure are under your control. The following factors may make you more likely to develop this condition:  · Smoking.  · Having type 2 diabetes mellitus, high cholesterol, or both.  · Not getting enough exercise or physical activity.  · Being overweight.  · Having too much fat, sugar, calories, or salt (sodium) in your diet.  · Drinking too much alcohol.  Some risk factors for high blood pressure may be difficult or impossible to change. Some of these factors include:  · Having chronic kidney disease.  · Having a family history of high blood pressure.  · Age. Risk increases with age.  · Race. You may be at higher risk if you are .  · Gender. Men are at higher risk than women before age 45. After age 65, women are at higher risk than men.  · Having obstructive sleep apnea.  · Stress.  What are the signs or symptoms?  High blood pressure may not cause symptoms. Very high " blood pressure (hypertensive crisis) may cause:  · Headache.  · Anxiety.  · Shortness of breath.  · Nosebleed.  · Nausea and vomiting.  · Vision changes.  · Severe chest pain.  · Seizures.  How is this diagnosed?  This condition is diagnosed by measuring your blood pressure while you are seated, with your arm resting on a flat surface, your legs uncrossed, and your feet flat on the floor. The cuff of the blood pressure monitor will be placed directly against the skin of your upper arm at the level of your heart. It should be measured at least twice using the same arm. Certain conditions can cause a difference in blood pressure between your right and left arms.  Certain factors can cause blood pressure readings to be lower or higher than normal for a short period of time:  · When your blood pressure is higher when you are in a health care provider's office than when you are at home, this is called white coat hypertension. Most people with this condition do not need medicines.  · When your blood pressure is higher at home than when you are in a health care provider's office, this is called masked hypertension. Most people with this condition may need medicines to control blood pressure.  If you have a high blood pressure reading during one visit or you have normal blood pressure with other risk factors, you may be asked to:  · Return on a different day to have your blood pressure checked again.  · Monitor your blood pressure at home for 1 week or longer.  If you are diagnosed with hypertension, you may have other blood or imaging tests to help your health care provider understand your overall risk for other conditions.  How is this treated?  This condition is treated by making healthy lifestyle changes, such as eating healthy foods, exercising more, and reducing your alcohol intake. Your health care provider may prescribe medicine if lifestyle changes are not enough to get your blood pressure under control, and  if:  · Your systolic blood pressure is above 130.  · Your diastolic blood pressure is above 80.  Your personal target blood pressure may vary depending on your medical conditions, your age, and other factors.  Follow these instructions at home:  Eating and drinking    · Eat a diet that is high in fiber and potassium, and low in sodium, added sugar, and fat. An example eating plan is called the DASH (Dietary Approaches to Stop Hypertension) diet. To eat this way:  ? Eat plenty of fresh fruits and vegetables. Try to fill one half of your plate at each meal with fruits and vegetables.  ? Eat whole grains, such as whole-wheat pasta, brown rice, or whole-grain bread. Fill about one fourth of your plate with whole grains.  ? Eat or drink low-fat dairy products, such as skim milk or low-fat yogurt.  ? Avoid fatty cuts of meat, processed or cured meats, and poultry with skin. Fill about one fourth of your plate with lean proteins, such as fish, chicken without skin, beans, eggs, or tofu.  ? Avoid pre-made and processed foods. These tend to be higher in sodium, added sugar, and fat.  · Reduce your daily sodium intake. Most people with hypertension should eat less than 1,500 mg of sodium a day.  · Do not drink alcohol if:  ? Your health care provider tells you not to drink.  ? You are pregnant, may be pregnant, or are planning to become pregnant.  · If you drink alcohol:  ? Limit how much you use to:  § 0-1 drink a day for women.  § 0-2 drinks a day for men.  ? Be aware of how much alcohol is in your drink. In the [place] one drink equals one 12 oz bottle of beer (355 mL), one 5 oz glass of wine (148 mL), or one 1½ oz glass of hard liquor (44 mL).  Lifestyle    · Work with your health care provider to maintain a healthy body weight or to lose weight. Ask what an ideal weight is for you.  · Get at least 30 minutes of exercise most days of the week. Activities may include walking, swimming, or biking.  · Include exercise to  strengthen your muscles (resistance exercise), such as Pilates or lifting weights, as part of your weekly exercise routine. Try to do these types of exercises for 30 minutes at least 3 days a week.  · Do not use any products that contain nicotine or tobacco, such as cigarettes, e-cigarettes, and chewing tobacco. If you need help quitting, ask your health care provider.  · Monitor your blood pressure at home as told by your health care provider.  · Keep all follow-up visits as told by your health care provider. This is important.  Medicines  · Take over-the-counter and prescription medicines only as told by your health care provider. Follow directions carefully. Blood pressure medicines must be taken as prescribed.  · Do not skip doses of blood pressure medicine. Doing this puts you at risk for problems and can make the medicine less effective.  · Ask your health care provider about side effects or reactions to medicines that you should watch for.  Contact a health care provider if you:  · Think you are having a reaction to a medicine you are taking.  · Have headaches that keep coming back (recurring).  · Feel dizzy.  · Have swelling in your ankles.  · Have trouble with your vision.  Get help right away if you:  · Develop a severe headache or confusion.  · Have unusual weakness or numbness.  · Feel faint.  · Have severe pain in your chest or abdomen.  · Vomit repeatedly.  · Have trouble breathing.  Summary  · Hypertension is when the force of blood pumping through your arteries is too strong. If this condition is not controlled, it may put you at risk for serious complications.  · Your personal target blood pressure may vary depending on your medical conditions, your age, and other factors. For most people, a normal blood pressure is less than 120/80.  · Hypertension is treated with lifestyle changes, medicines, or a combination of both. Lifestyle changes include losing weight, eating a healthy, low-sodium diet,  exercising more, and limiting alcohol.  This information is not intended to replace advice given to you by your health care provider. Make sure you discuss any questions you have with your health care provider.  Document Revised: 08/28/2019 Document Reviewed: 08/28/2019  Elsevier Patient Education © 2020 [employer]    Low-Purine Eating Plan  A low-purine eating plan involves making food choices to limit your intake of purine. Purine is a kind of uric acid. Too much uric acid in your blood can cause certain conditions, such as gout and kidney stones. Eating a low-purine diet can help control these conditions.  What are tips for following this plan?  Reading food labels    · Avoid foods with saturated or Trans fat.  · Check the ingredient list of grains-based foods, such as bread and cereal, to make sure that they contain whole grains.  · Check the ingredient list of sauces or soups to make sure they do not contain meat or fish.  · When choosing soft drinks, check the ingredient list to make sure they do not contain high-fructose corn syrup.  Shopping  · Buy plenty of fresh fruits and vegetables.  · Avoid buying canned or fresh fish.  · Buy dairy products labeled as low-fat or nonfat.  · Avoid buying premade or processed foods. These foods are often high in fat, salt (sodium), and added sugar.  Cooking  · Use olive oil instead of butter when cooking. Oils like olive oil, canola oil, and sunflower oil contain healthy fats.  Meal planning  · Learn which foods do or do not affect you. If you find out that a food tends to cause your gout symptoms to flare up, avoid eating that food. You can enjoy foods that do not cause problems. If you have any questions about a food item, talk with your dietitian or health care provider.  · Limit foods high in fat, especially saturated fat. Fat makes it harder for your body to get rid of uric acid.  · Choose foods that are lower in fat and are lean sources of protein.  General  guidelines  · Limit alcohol intake to no more than 1 drink a day for nonpregnant women and 2 drinks a day for men. One drink equals 12 oz of beer, 5 oz of wine, or 1½ oz of hard liquor. Alcohol can affect the way your body gets rid of uric acid.  · Drink plenty of water to keep your urine clear or pale yellow. Fluids can help remove uric acid from your body.  · If directed by your health care provider, take a vitamin C supplement.  · Work with your health care provider and dietitian to develop a plan to achieve or maintain a healthy weight. Losing weight can help reduce uric acid in your blood.  What foods are recommended?  The items listed may not be a complete list. Talk with your dietitian about what dietary choices are best for you.  Foods low in purines  Foods low in purines do not need to be limited. These include:  · All fruits.  · All low-purine vegetables, pickles, and olives.  · Breads, pasta, rice, cornbread, and popcorn. Cake and other baked goods.  · All dairy foods.  · Eggs, nuts, and nut butters.  · Spices and condiments, such as salt, herbs, and vinegar.  · Plant oils, butter, and margarine.  · Water, sugar-free soft drinks, tea, coffee, and cocoa.  · Vegetable-based soups, broths, sauces, and gravies.  Foods moderate in purines  Foods moderate in purines should be limited to the amounts listed.  · ½ cup of asparagus, cauliflower, spinach, mushrooms, or green peas, each day.  · 2/3 cup uncooked oatmeal, each day.  · ¼ cup dry wheat bran or wheat germ, each day.  · 2-3 ounces of meat or poultry, each day.  · 4-6 ounces of shellfish, such as crab, lobster, oysters, or shrimp, each day.  · 1 cup cooked beans, peas, or lentils, each day.  · Soup, broths, or bouillon made from meat or fish. Limit these foods as much as possible.  What foods are not recommended?  The items listed may not be a complete list. Talk with your dietitian about what dietary choices are best for you.  Limit your intake of foods  high in purines, including:  · Beer and other alcohol.  · Meat-based gravy or sauce.  · Canned or fresh fish, such as:  ? Anchovies, sardines, herring, and tuna.  ? Mussels and scallops.  ? Codfish, trout, and barbara.  · [place].  · Organ meats, such as:  ? Liver or kidney.  ? Tripe.  ? Sweetbreads (thymus gland or pancreas).  · Wild game or goose.  · Yeast or yeast extract supplements.  · Drinks sweetened with high-fructose corn syrup.  Summary  · Eating a low-purine diet can help control conditions caused by too much uric acid in the body, such as gout or kidney stones.  · Choose low-purine foods, limit alcohol, and limit foods high in fat.  · You will learn over time which foods do or do not affect you. If you find out that a food tends to cause your gout symptoms to flare up, avoid eating that food.  This information is not intended to replace advice given to you by your health care provider. Make sure you discuss any questions you have with your health care provider.  Document Revised: 11/30/2018 Document Reviewed: 01/31/2018  Elsevier Patient Education © 2020 [employer]

## 2020-12-23 NOTE — PROGRESS NOTES
Subjective   Brandan Higuera is a 45 y.o. male.   You have chosen to receive care through a telehealth visit.  Do you consent to use a video/audio connection for your medical care today? Yes    History of Present Illness    Since the last visit, he has overall felt well.  He has Essential Hypertension and well controlled on current medication, Hyperlipidemia and working on this with diet and exercise and Vitamin D deficiency and will update labs for continued management.  he has been compliant with current medications have reviewed them.  The patient denies medication side effects.  Will refill medications. There were no vitals taken for this visit.  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  He has labs ordered for next week and want to update his other labs.     Results for orders placed or performed in visit on 02/25/19   Comprehensive metabolic panel    Specimen: Blood   Result Value Ref Range    Glucose 101 (H) 65 - 99 mg/dL    BUN 11 6 - 20 mg/dL    Creatinine 1.02 0.76 - 1.27 mg/dL    eGFR Non African Am 80 >60 mL/min/1.73    eGFR African Am 98 >60 mL/min/1.73    BUN/Creatinine Ratio 10.8 7.0 - 25.0    Sodium 141 136 - 145 mmol/L    Potassium 4.9 3.5 - 5.2 mmol/L    Chloride 102 98 - 107 mmol/L    Total CO2 26.6 22.0 - 29.0 mmol/L    Calcium 9.8 8.6 - 10.5 mg/dL    Total Protein 7.1 6.0 - 8.5 g/dL    Albumin 4.60 3.50 - 5.20 g/dL    Globulin 2.5 gm/dL    A/G Ratio 1.8 g/dL    Total Bilirubin 0.3 0.1 - 1.2 mg/dL    Alkaline Phosphatase 68 39 - 117 U/L    AST (SGOT) 20 1 - 40 U/L    ALT (SGPT) 22 1 - 41 U/L   Lipid panel    Specimen: Blood   Result Value Ref Range    Total Cholesterol 204 (H) 0 - 200 mg/dL    Triglycerides 315 (H) 0 - 150 mg/dL    HDL Cholesterol 51 40 - 60 mg/dL    VLDL Cholesterol 63 (H) 5 - 40 mg/dL    LDL Cholesterol  90 0 - 100 mg/dL   TSH    Specimen: Blood   Result Value  Ref Range    TSH 2.350 0.270 - 4.200 mIU/mL   Hemoglobin A1c    Specimen: Blood   Result Value Ref Range    Hemoglobin A1C 5.30 4.80 - 5.60 %   T4, Free    Specimen: Blood   Result Value Ref Range    Free T4 1.03 0.93 - 1.70 ng/dL   T3, Free    Specimen: Blood   Result Value Ref Range    T3, Free 4.0 2.0 - 4.4 pg/mL   Uric Acid    Specimen: Blood   Result Value Ref Range    Uric Acid 10.5 (H) 3.4 - 7.0 mg/dL   Vitamin B12    Specimen: Blood   Result Value Ref Range    Vitamin B-12 299 211 - 946 pg/mL   Folate    Specimen: Blood   Result Value Ref Range    Folate 7.85 4.78 - 24.20 ng/mL   Vitamin D 25 Hydroxy    Specimen: Blood   Result Value Ref Range    25 Hydroxy, Vitamin D 49.3 30.0 - 100.0 ng/ml   CBC and Differential    Specimen: Blood   Result Value Ref Range    WBC 6.45 3.40 - 10.80 10*3/mm3    RBC 5.55 4.14 - 5.80 10*6/mm3    Hemoglobin 15.4 13.0 - 17.7 g/dL    Hematocrit 49.3 37.5 - 51.0 %    MCV 88.8 79.0 - 97.0 fL    MCH 27.7 26.6 - 33.0 pg    MCHC 31.2 (L) 31.5 - 35.7 g/dL    RDW 13.4 12.3 - 15.4 %    Platelets 215 140 - 450 10*3/mm3    Neutrophil Rel % 47.5 42.7 - 76.0 %    Lymphocyte Rel % 32.6 19.6 - 45.3 %    Monocyte Rel % 8.1 5.0 - 12.0 %    Eosinophil Rel % 10.1 (H) 0.3 - 6.2 %    Basophil Rel % 1.1 0.0 - 1.5 %    Neutrophils Absolute 3.07 1.40 - 7.00 10*3/mm3    Lymphocytes Absolute 2.10 0.70 - 3.10 10*3/mm3    Monocytes Absolute 0.52 0.10 - 0.90 10*3/mm3    Eosinophils Absolute 0.65 (H) 0.00 - 0.40 10*3/mm3    Basophils Absolute 0.07 0.00 - 0.20 10*3/mm3    Immature Granulocyte Rel % 0.6 (H) 0.0 - 0.5 %    Immature Grans Absolute 0.04 0.00 - 0.05 10*3/mm3    nRBC 0.0 0.0 - 0.0 /100 WBC   just finishing oral pred for gout flare per DR Valladares      The following portions of the patient's history were reviewed and updated as appropriate: allergies, current medications, past family history, past medical history, past social history, past surgical history and problem list.    Review of Systems    Constitutional: Negative for activity change, appetite change and unexpected weight change.   HENT: Negative for nosebleeds and trouble swallowing.    Eyes: Negative for pain and visual disturbance.   Respiratory: Negative for chest tightness, shortness of breath and wheezing.    Cardiovascular: Negative for chest pain and palpitations.   Gastrointestinal: Negative for abdominal pain and blood in stool.   Endocrine: Negative.    Genitourinary: Negative for difficulty urinating and hematuria.   Musculoskeletal: Positive for arthralgias and joint swelling.   Skin: Negative for color change and rash.   Allergic/Immunologic: Negative.    Neurological: Negative for syncope, speech difficulty and headaches.   Hematological: Negative for adenopathy.   Psychiatric/Behavioral: Negative for agitation, confusion and dysphoric mood.   All other systems reviewed and are negative.      Objective   Physical Exam  Neurological:      Mental Status: He is alert and oriented to person, place, and time.   Psychiatric:         Mood and Affect: Mood normal.         Behavior: Behavior normal.         Thought Content: Thought content normal.         Judgment: Judgment normal.         Assessment/Plan   Diagnoses and all orders for this visit:    1. Essential hypertension (Primary)  -     Comprehensive metabolic panel  -     Lipid panel  -     CBC and Differential  -     TSH  -     T3, Free  -     T4, Free  -     Vitamin B12  -     Folate  -     Vitamin D 25 Hydroxy    2. Acute idiopathic gout of ankle, unspecified laterality  -     Comprehensive metabolic panel  -     Lipid panel  -     CBC and Differential  -     TSH  -     T3, Free  -     T4, Free  -     Vitamin B12  -     Folate  -     Vitamin D 25 Hydroxy    3. Mixed hyperlipidemia  -     Comprehensive metabolic panel  -     Lipid panel  -     CBC and Differential  -     TSH  -     T3, Free  -     T4, Free  -     Vitamin B12  -     Folate  -     Vitamin D 25 Hydroxy    4. Vitamin D  deficiency disease  -     Comprehensive metabolic panel  -     Lipid panel  -     CBC and Differential  -     TSH  -     T3, Free  -     T4, Free  -     Vitamin B12  -     Folate  -     Vitamin D 25 Hydroxy    Other orders  -     amLODIPine (NORVASC) 10 MG tablet; Take 1 tablet by mouth Daily. For BP  Dispense: 90 tablet; Refill: 1  -     lisinopril (PRINIVIL,ZESTRIL) 40 MG tablet; Take 1 tablet by mouth Daily. For BP  Dispense: 90 tablet; Refill: 1    seeing Dr Malcolm for gout; recent gout--just added Cochicine  Seeing Serena---[facility]  Get labs  Plan, Brandan Higuera, was seen today.  he was seen for HTN and continue medication, Hyperlipidemia and will work on this with diet and exercise and Vitamin D deficiency and will update labs .    Sent me bp readings  Stop smoking  Time spent on phone 15 minutes

## 2021-02-07 LAB
25(OH)D3+25(OH)D2 SERPL-MCNC: 25.3 NG/ML (ref 30–100)
ALBUMIN SERPL-MCNC: 4.5 G/DL (ref 4–5)
ALBUMIN/GLOB SERPL: 1.7 {RATIO} (ref 1.2–2.2)
ALP SERPL-CCNC: 65 IU/L (ref 39–117)
ALT SERPL-CCNC: 26 IU/L (ref 0–44)
AST SERPL-CCNC: 25 IU/L (ref 0–40)
BASOPHILS # BLD AUTO: 0.1 X10E3/UL (ref 0–0.2)
BASOPHILS NFR BLD AUTO: 2 %
BILIRUB SERPL-MCNC: 0.3 MG/DL (ref 0–1.2)
BUN SERPL-MCNC: 14 MG/DL (ref 6–24)
BUN/CREAT SERPL: 14 (ref 9–20)
CALCIUM SERPL-MCNC: 9.6 MG/DL (ref 8.7–10.2)
CHLORIDE SERPL-SCNC: 101 MMOL/L (ref 96–106)
CHOLEST SERPL-MCNC: 251 MG/DL (ref 100–199)
CO2 SERPL-SCNC: 22 MMOL/L (ref 20–29)
CREAT SERPL-MCNC: 1.01 MG/DL (ref 0.76–1.27)
EOSINOPHIL # BLD AUTO: 0.8 X10E3/UL (ref 0–0.4)
EOSINOPHIL NFR BLD AUTO: 13 %
ERYTHROCYTE [DISTWIDTH] IN BLOOD BY AUTOMATED COUNT: 14 % (ref 11.6–15.4)
FOLATE SERPL-MCNC: 15.7 NG/ML
GLOBULIN SER CALC-MCNC: 2.6 G/DL (ref 1.5–4.5)
GLUCOSE SERPL-MCNC: 96 MG/DL (ref 65–99)
HCT VFR BLD AUTO: 42.9 % (ref 37.5–51)
HDLC SERPL-MCNC: 47 MG/DL
HGB BLD-MCNC: 14.3 G/DL (ref 13–17.7)
IMM GRANULOCYTES # BLD AUTO: 0 X10E3/UL (ref 0–0.1)
IMM GRANULOCYTES NFR BLD AUTO: 0 %
LDLC SERPL CALC-MCNC: 114 MG/DL (ref 0–99)
LYMPHOCYTES # BLD AUTO: 1.9 X10E3/UL (ref 0.7–3.1)
LYMPHOCYTES NFR BLD AUTO: 33 %
MCH RBC QN AUTO: 27.9 PG (ref 26.6–33)
MCHC RBC AUTO-ENTMCNC: 33.3 G/DL (ref 31.5–35.7)
MCV RBC AUTO: 84 FL (ref 79–97)
MONOCYTES # BLD AUTO: 0.4 X10E3/UL (ref 0.1–0.9)
MONOCYTES NFR BLD AUTO: 7 %
NEUTROPHILS # BLD AUTO: 2.6 X10E3/UL (ref 1.4–7)
NEUTROPHILS NFR BLD AUTO: 45 %
PLATELET # BLD AUTO: 167 X10E3/UL (ref 150–450)
POTASSIUM SERPL-SCNC: 4.7 MMOL/L (ref 3.5–5.2)
PROT SERPL-MCNC: 7.1 G/DL (ref 6–8.5)
RBC # BLD AUTO: 5.12 X10E6/UL (ref 4.14–5.8)
SODIUM SERPL-SCNC: 143 MMOL/L (ref 134–144)
T3FREE SERPL-MCNC: 3.4 PG/ML (ref 2–4.4)
T4 FREE SERPL-MCNC: 0.94 NG/DL (ref 0.82–1.77)
TRIGL SERPL-MCNC: 513 MG/DL (ref 0–149)
TSH SERPL DL<=0.005 MIU/L-ACNC: 1.29 UIU/ML (ref 0.45–4.5)
VIT B12 SERPL-MCNC: 292 PG/ML (ref 232–1245)
VLDLC SERPL CALC-MCNC: 90 MG/DL (ref 5–40)
WBC # BLD AUTO: 5.6 X10E3/UL (ref 3.4–10.8)

## 2021-02-08 DIAGNOSIS — E53.8 LOW SERUM VITAMIN B12: ICD-10-CM

## 2021-02-08 DIAGNOSIS — E78.2 MIXED HYPERLIPIDEMIA: ICD-10-CM

## 2021-02-08 DIAGNOSIS — E55.9 VITAMIN D DEFICIENCY DISEASE: ICD-10-CM

## 2021-02-08 DIAGNOSIS — E66.01 MORBIDLY OBESE (HCC): ICD-10-CM

## 2021-02-08 DIAGNOSIS — I10 ESSENTIAL HYPERTENSION: Primary | ICD-10-CM

## 2021-02-08 RX ORDER — ICOSAPENT ETHYL 1000 MG/1
2 CAPSULE ORAL 2 TIMES DAILY WITH MEALS
Qty: 120 CAPSULE | Refills: 11 | Status: SHIPPED | OUTPATIENT
Start: 2021-02-08 | End: 2021-12-06

## 2021-02-08 RX ORDER — ROSUVASTATIN CALCIUM 10 MG/1
10 TABLET, COATED ORAL DAILY
Qty: 30 TABLET | Refills: 11 | Status: SHIPPED | OUTPATIENT
Start: 2021-02-08 | End: 2021-06-07

## 2021-02-08 RX ORDER — MAGNESIUM 200 MG
TABLET ORAL
Qty: 90 EACH | Refills: 3 | Status: SHIPPED | OUTPATIENT
Start: 2021-02-08 | End: 2021-12-06 | Stop reason: SDUPTHER

## 2021-04-06 ENCOUNTER — BULK ORDERING (OUTPATIENT)
Dept: CASE MANAGEMENT | Facility: OTHER | Age: 46
End: 2021-04-06

## 2021-04-06 DIAGNOSIS — Z23 IMMUNIZATION DUE: ICD-10-CM

## 2021-06-07 RX ORDER — ROSUVASTATIN CALCIUM 10 MG/1
TABLET, COATED ORAL
Qty: 90 TABLET | Refills: 3 | Status: SHIPPED | OUTPATIENT
Start: 2021-06-07 | End: 2022-08-21

## 2021-07-07 RX ORDER — LISINOPRIL 40 MG/1
40 TABLET ORAL DAILY
Qty: 30 TABLET | Refills: 0 | Status: SHIPPED | OUTPATIENT
Start: 2021-07-07 | End: 2021-08-09

## 2021-07-07 RX ORDER — AMLODIPINE BESYLATE 10 MG/1
TABLET ORAL
Qty: 30 TABLET | Refills: 0 | Status: SHIPPED | OUTPATIENT
Start: 2021-07-07 | End: 2021-08-09

## 2021-08-09 RX ORDER — AMLODIPINE BESYLATE 10 MG/1
TABLET ORAL
Qty: 15 TABLET | Refills: 0 | Status: SHIPPED | OUTPATIENT
Start: 2021-08-09 | End: 2021-08-22

## 2021-08-09 RX ORDER — LISINOPRIL 40 MG/1
TABLET ORAL
Qty: 15 TABLET | Refills: 0 | Status: SHIPPED | OUTPATIENT
Start: 2021-08-09 | End: 2021-08-22

## 2021-08-22 RX ORDER — LISINOPRIL 40 MG/1
TABLET ORAL
Qty: 15 TABLET | Refills: 0 | Status: SHIPPED | OUTPATIENT
Start: 2021-08-22 | End: 2021-10-11

## 2021-08-22 RX ORDER — AMLODIPINE BESYLATE 10 MG/1
TABLET ORAL
Qty: 15 TABLET | Refills: 0 | Status: SHIPPED | OUTPATIENT
Start: 2021-08-22 | End: 2021-10-11

## 2021-10-11 RX ORDER — AMLODIPINE BESYLATE 10 MG/1
TABLET ORAL
Qty: 7 TABLET | Refills: 0 | Status: SHIPPED | OUTPATIENT
Start: 2021-10-11 | End: 2021-12-06 | Stop reason: SDUPTHER

## 2021-10-11 RX ORDER — LISINOPRIL 40 MG/1
TABLET ORAL
Qty: 7 TABLET | Refills: 0 | Status: SHIPPED | OUTPATIENT
Start: 2021-10-11 | End: 2021-12-06 | Stop reason: SDUPTHER

## 2021-12-06 ENCOUNTER — OFFICE VISIT (OUTPATIENT)
Dept: FAMILY MEDICINE CLINIC | Facility: CLINIC | Age: 46
End: 2021-12-06

## 2021-12-06 VITALS
RESPIRATION RATE: 16 BRPM | TEMPERATURE: 97.5 F | HEIGHT: 68 IN | OXYGEN SATURATION: 98 % | HEART RATE: 82 BPM | SYSTOLIC BLOOD PRESSURE: 160 MMHG | DIASTOLIC BLOOD PRESSURE: 100 MMHG | BODY MASS INDEX: 43.04 KG/M2 | WEIGHT: 284 LBS

## 2021-12-06 DIAGNOSIS — M10.079 ACUTE IDIOPATHIC GOUT OF ANKLE, UNSPECIFIED LATERALITY: ICD-10-CM

## 2021-12-06 DIAGNOSIS — E53.8 LOW SERUM VITAMIN B12: ICD-10-CM

## 2021-12-06 DIAGNOSIS — R73.01 IMPAIRED FASTING GLUCOSE: ICD-10-CM

## 2021-12-06 DIAGNOSIS — E55.9 VITAMIN D DEFICIENCY DISEASE: ICD-10-CM

## 2021-12-06 DIAGNOSIS — E78.2 MIXED HYPERLIPIDEMIA: ICD-10-CM

## 2021-12-06 DIAGNOSIS — I10 PRIMARY HYPERTENSION: Primary | ICD-10-CM

## 2021-12-06 PROCEDURE — 99214 OFFICE O/P EST MOD 30 MIN: CPT | Performed by: PHYSICIAN ASSISTANT

## 2021-12-06 RX ORDER — METHYLPREDNISOLONE 4 MG/1
TABLET ORAL
Qty: 21 TABLET | Refills: 0 | Status: SHIPPED | OUTPATIENT
Start: 2021-12-06

## 2021-12-06 RX ORDER — AMLODIPINE BESYLATE 10 MG/1
10 TABLET ORAL DAILY
Qty: 90 TABLET | Refills: 1 | Status: SHIPPED | OUTPATIENT
Start: 2021-12-06 | End: 2022-06-08

## 2021-12-06 RX ORDER — ALLOPURINOL 300 MG/1
300 TABLET ORAL DAILY
Qty: 90 TABLET | Refills: 3 | Status: SHIPPED | OUTPATIENT
Start: 2021-12-06

## 2021-12-06 RX ORDER — MAGNESIUM 200 MG
TABLET ORAL
Qty: 90 EACH | Refills: 3 | Status: SHIPPED | OUTPATIENT
Start: 2021-12-06 | End: 2021-12-07

## 2021-12-06 RX ORDER — LISINOPRIL 40 MG/1
40 TABLET ORAL DAILY
Qty: 90 TABLET | Refills: 1 | Status: SHIPPED | OUTPATIENT
Start: 2021-12-06 | End: 2022-06-08

## 2021-12-06 NOTE — PROGRESS NOTES
"Subjective   Brandan Higuera is a 46 y.o. male.     History of Present Illness    Since the last visit, he has overall felt well.  He has Vitamin D deficiency and will update labs for continued management and Essential hypertension and has been out of medication his amlodipine and lisinopril for a month.  Patient is overdue for appointment and will get labs today.  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  Plan to restart meds today and see him back in a few weeks.  Also need to follow-up on the triglycerides he is taking the statin, the Crestor for his LDL cholesterol and has family history of heart disease with his brother.  Also to get A1c with his mom having diabetes and he has history of impaired fasting glucose on prior labs.  he has been compliant with current medications have reviewed them.  The patient denies medication side effects.  Will refill medications. /100   Pulse 82   Temp 97.5 °F (36.4 °C)   Resp 16   Ht 172.7 cm (67.99\")   Wt 129 kg (284 lb)   SpO2 98%   BMI 43.19 kg/m²   Sees psych  Dr Malcolm for gout, had appt Jimy and did on Colchicine.  He has labs ordered for next week and want to update his other labs  Results for orders placed or performed in visit on 12/23/20   Comprehensive metabolic panel    Specimen: Blood   Result Value Ref Range    Glucose 96 65 - 99 mg/dL    BUN 14 6 - 24 mg/dL    Creatinine 1.01 0.76 - 1.27 mg/dL    eGFR Non African Am 91 >59 mL/min/1.73    eGFR African Am 105 >59 mL/min/1.73    BUN/Creatinine Ratio 14 9 - 20    Sodium 143 134 - 144 mmol/L    Potassium 4.7 3.5 - 5.2 mmol/L    Chloride 101 96 - 106 mmol/L    Total CO2 22 20 - 29 mmol/L    Calcium 9.6 8.7 - 10.2 mg/dL    Total Protein 7.1 6.0 - 8.5 g/dL    Albumin 4.5 4.0 - 5.0 g/dL    Globulin 2.6 1.5 - 4.5 g/dL    A/G Ratio 1.7 1.2 - 2.2    Total Bilirubin 0.3 0.0 - 1.2 mg/dL    Alkaline " Phosphatase 65 39 - 117 IU/L    AST (SGOT) 25 0 - 40 IU/L    ALT (SGPT) 26 0 - 44 IU/L   Lipid panel    Specimen: Blood   Result Value Ref Range    Total Cholesterol 251 (H) 100 - 199 mg/dL    Triglycerides 513 (H) 0 - 149 mg/dL    HDL Cholesterol 47 >39 mg/dL    VLDL Cholesterol Michael 90 (H) 5 - 40 mg/dL    LDL Chol Calc (NIH) 114 (H) 0 - 99 mg/dL   TSH    Specimen: Blood   Result Value Ref Range    TSH 1.290 0.450 - 4.500 uIU/mL   T3, Free    Specimen: Blood   Result Value Ref Range    T3, Free 3.4 2.0 - 4.4 pg/mL   T4, Free    Specimen: Blood   Result Value Ref Range    Free T4 0.94 0.82 - 1.77 ng/dL   Vitamin B12    Specimen: Blood   Result Value Ref Range    Vitamin B-12 292 232 - 1,245 pg/mL   Folate    Specimen: Blood   Result Value Ref Range    Folate 15.7 >3.0 ng/mL   Vitamin D 25 Hydroxy    Specimen: Blood   Result Value Ref Range    25 Hydroxy, Vitamin D 25.3 (L) 30.0 - 100.0 ng/mL   CBC and Differential    Specimen: Blood   Result Value Ref Range    WBC 5.6 3.4 - 10.8 x10E3/uL    RBC 5.12 4.14 - 5.80 x10E6/uL    Hemoglobin 14.3 13.0 - 17.7 g/dL    Hematocrit 42.9 37.5 - 51.0 %    MCV 84 79 - 97 fL    MCH 27.9 26.6 - 33.0 pg    MCHC 33.3 31.5 - 35.7 g/dL    RDW 14.0 11.6 - 15.4 %    Platelets 167 150 - 450 x10E3/uL    Neutrophil Rel % 45 Not Estab. %    Lymphocyte Rel % 33 Not Estab. %    Monocyte Rel % 7 Not Estab. %    Eosinophil Rel % 13 Not Estab. %    Basophil Rel % 2 Not Estab. %    Neutrophils Absolute 2.6 1.4 - 7.0 x10E3/uL    Lymphocytes Absolute 1.9 0.7 - 3.1 x10E3/uL    Monocytes Absolute 0.4 0.1 - 0.9 x10E3/uL    Eosinophils Absolute 0.8 (H) 0.0 - 0.4 x10E3/uL    Basophils Absolute 0.1 0.0 - 0.2 x10E3/uL    Immature Granulocyte Rel % 0 Not Estab. %    Immature Grans Absolute 0.0 0.0 - 0.1 x10E3/uL   has DOT  Not on trig med;  On statin  Had labs 2-6-21  Trigs too high and sending fish oil Rx and 2013 AHA (American Heart Association) Cholesterol Risk Ratio Score Goal is <=7.5% and your score is:   10.21% and need to add statin-----also d/t brother had heart disease  Labs to f/u 3 mos  Vitamin B12 is in lower range.  Start over the counter B12 sublingual 1,000 mcg daily.  Labs show low Vitamin D levels.  I want you to add OTC Vitamin D 2,000 I.U. Daily.  Last DOT bp was at goal  No observed apnea  The following portions of the patient's history were reviewed and updated as appropriate: allergies, current medications, past family history, past medical history, past social history, past surgical history and problem list.    Review of Systems   Constitutional: Positive for unexpected weight change. Negative for activity change and appetite change.   HENT: Negative for nosebleeds and trouble swallowing.    Eyes: Negative for pain and visual disturbance.   Respiratory: Negative for chest tightness, shortness of breath and wheezing.    Cardiovascular: Negative for chest pain and palpitations.   Gastrointestinal: Negative for abdominal pain and blood in stool.   Endocrine: Negative.    Genitourinary: Negative for difficulty urinating and hematuria.   Musculoskeletal: Negative for joint swelling.   Skin: Negative for color change and rash.   Allergic/Immunologic: Negative.    Neurological: Negative for syncope and speech difficulty.   Hematological: Negative for adenopathy.   Psychiatric/Behavioral: Negative for agitation and confusion.   All other systems reviewed and are negative.      Objective   Physical Exam  Vitals and nursing note reviewed.   Constitutional:       General: He is not in acute distress.     Appearance: He is well-developed. He is obese. He is not diaphoretic.   HENT:      Head: Normocephalic.   Eyes:      General: No scleral icterus.     Conjunctiva/sclera: Conjunctivae normal.      Pupils: Pupils are equal, round, and reactive to light.   Neck:      Vascular: No carotid bruit.   Cardiovascular:      Rate and Rhythm: Normal rate and regular rhythm.      Pulses: Normal pulses.      Heart sounds:  Normal heart sounds. No murmur heard.  No gallop.    Pulmonary:      Effort: Pulmonary effort is normal.      Breath sounds: Normal breath sounds. No wheezing or rales.   Musculoskeletal:         General: Normal range of motion.      Cervical back: Normal range of motion and neck supple.      Right lower leg: No edema.      Left lower leg: No edema.   Skin:     General: Skin is warm and dry.      Coloration: Skin is not jaundiced.      Findings: No rash.   Neurological:      General: No focal deficit present.      Mental Status: He is alert and oriented to person, place, and time.   Psychiatric:         Mood and Affect: Mood normal. Affect is not inappropriate.         Behavior: Behavior normal.         Thought Content: Thought content normal.         Judgment: Judgment normal.         Assessment/Plan   Diagnoses and all orders for this visit:    1. Primary hypertension (Primary)  -     Comprehensive metabolic panel  -     Lipid panel  -     CBC and Differential  -     TSH  -     Hemoglobin A1c  -     T3, Free  -     T4, Free  -     Vitamin B12  -     Folate  -     Vitamin D 25 Hydroxy    2. Mixed hyperlipidemia  -     Comprehensive metabolic panel  -     Lipid panel  -     CBC and Differential  -     TSH  -     Hemoglobin A1c  -     T3, Free  -     T4, Free  -     Vitamin B12  -     Folate  -     Vitamin D 25 Hydroxy    3. Low serum vitamin B12  -     Comprehensive metabolic panel  -     Lipid panel  -     CBC and Differential  -     TSH  -     Hemoglobin A1c  -     T3, Free  -     T4, Free  -     Vitamin B12  -     Folate  -     Vitamin D 25 Hydroxy    4. Acute idiopathic gout of ankle, unspecified laterality  -     Comprehensive metabolic panel  -     Lipid panel  -     CBC and Differential  -     TSH  -     Hemoglobin A1c  -     T3, Free  -     T4, Free  -     Vitamin B12  -     Folate  -     Vitamin D 25 Hydroxy    5. Vitamin D deficiency disease  -     Comprehensive metabolic panel  -     Lipid panel  -      CBC and Differential  -     TSH  -     Hemoglobin A1c  -     T3, Free  -     T4, Free  -     Vitamin B12  -     Folate  -     Vitamin D 25 Hydroxy    6. Impaired fasting glucose  -     Comprehensive metabolic panel  -     Lipid panel  -     CBC and Differential  -     TSH  -     Hemoglobin A1c  -     T3, Free  -     T4, Free  -     Vitamin B12  -     Folate  -     Vitamin D 25 Hydroxy    Other orders  -     allopurinol (ZYLOPRIM) 300 MG tablet; Take 1 tablet by mouth Daily. For gout suppression  Dispense: 90 tablet; Refill: 3  -     methylPREDNISolone (MEDROL) 4 MG dose pack; follow package directions  Dispense: 21 tablet; Refill: 0  -     lisinopril (PRINIVIL,ZESTRIL) 40 MG tablet; Take 1 tablet by mouth Daily. For BP  Dispense: 90 tablet; Refill: 1  -     amLODIPine (NORVASC) 10 MG tablet; Take 1 tablet by mouth Daily. For BP  Dispense: 90 tablet; Refill: 1  -     Cyanocobalamin (Vitamin B-12) 1000 MCG sublingual tablet; One SL daily  Dispense: 90 each; Refill: 3      Sees psych  Stop smoking  Update labs  Dr Malcolm for gout--I will refill Allopurinol  See me few weeks for bp---restart meds  Still want to avoid diuretics due to his severe gout history  Plan, Brandan Higuera, was seen today.  he was seen for Hyperlipidemia and will continue current medication, Vitamin D deficiency and will update labs  and HTN----essential---was controlled on meds.

## 2021-12-07 LAB
25(OH)D3+25(OH)D2 SERPL-MCNC: 24.6 NG/ML (ref 30–100)
ALBUMIN SERPL-MCNC: 4.5 G/DL (ref 4–5)
ALBUMIN/GLOB SERPL: 2.1 {RATIO} (ref 1.2–2.2)
ALP SERPL-CCNC: 76 IU/L (ref 44–121)
ALT SERPL-CCNC: 34 IU/L (ref 0–44)
AST SERPL-CCNC: 30 IU/L (ref 0–40)
BASOPHILS # BLD AUTO: 0.1 X10E3/UL (ref 0–0.2)
BASOPHILS NFR BLD AUTO: 1 %
BILIRUB SERPL-MCNC: <0.2 MG/DL (ref 0–1.2)
BUN SERPL-MCNC: 10 MG/DL (ref 6–24)
BUN/CREAT SERPL: 12 (ref 9–20)
CALCIUM SERPL-MCNC: 9.2 MG/DL (ref 8.7–10.2)
CHLORIDE SERPL-SCNC: 105 MMOL/L (ref 96–106)
CHOLEST SERPL-MCNC: 218 MG/DL (ref 100–199)
CO2 SERPL-SCNC: 21 MMOL/L (ref 20–29)
CREAT SERPL-MCNC: 0.82 MG/DL (ref 0.76–1.27)
EOSINOPHIL # BLD AUTO: 0.4 X10E3/UL (ref 0–0.4)
EOSINOPHIL NFR BLD AUTO: 7 %
ERYTHROCYTE [DISTWIDTH] IN BLOOD BY AUTOMATED COUNT: 13.9 % (ref 11.6–15.4)
FOLATE SERPL-MCNC: 18.4 NG/ML
GLOBULIN SER CALC-MCNC: 2.1 G/DL (ref 1.5–4.5)
GLUCOSE SERPL-MCNC: 98 MG/DL (ref 65–99)
HBA1C MFR BLD: 5.4 % (ref 4.8–5.6)
HCT VFR BLD AUTO: 42.1 % (ref 37.5–51)
HDLC SERPL-MCNC: 35 MG/DL
HGB BLD-MCNC: 14.1 G/DL (ref 13–17.7)
IMM GRANULOCYTES # BLD AUTO: 0 X10E3/UL (ref 0–0.1)
IMM GRANULOCYTES NFR BLD AUTO: 0 %
LDLC SERPL CALC-MCNC: 72 MG/DL (ref 0–99)
LYMPHOCYTES # BLD AUTO: 2 X10E3/UL (ref 0.7–3.1)
LYMPHOCYTES NFR BLD AUTO: 33 %
MCH RBC QN AUTO: 28.3 PG (ref 26.6–33)
MCHC RBC AUTO-ENTMCNC: 33.5 G/DL (ref 31.5–35.7)
MCV RBC AUTO: 84 FL (ref 79–97)
MONOCYTES # BLD AUTO: 0.5 X10E3/UL (ref 0.1–0.9)
MONOCYTES NFR BLD AUTO: 9 %
NEUTROPHILS # BLD AUTO: 3.1 X10E3/UL (ref 1.4–7)
NEUTROPHILS NFR BLD AUTO: 50 %
PLATELET # BLD AUTO: 169 X10E3/UL (ref 150–450)
POTASSIUM SERPL-SCNC: 4.3 MMOL/L (ref 3.5–5.2)
PROT SERPL-MCNC: 6.6 G/DL (ref 6–8.5)
RBC # BLD AUTO: 4.99 X10E6/UL (ref 4.14–5.8)
SODIUM SERPL-SCNC: 142 MMOL/L (ref 134–144)
T3FREE SERPL-MCNC: 3.2 PG/ML (ref 2–4.4)
T4 FREE SERPL-MCNC: 0.83 NG/DL (ref 0.82–1.77)
TRIGL SERPL-MCNC: 715 MG/DL (ref 0–149)
TSH SERPL DL<=0.005 MIU/L-ACNC: 1.76 UIU/ML (ref 0.45–4.5)
VIT B12 SERPL-MCNC: 304 PG/ML (ref 232–1245)
VLDLC SERPL CALC-MCNC: 111 MG/DL (ref 5–40)
WBC # BLD AUTO: 6 X10E3/UL (ref 3.4–10.8)

## 2021-12-07 RX ORDER — OMEGA-3-ACID ETHYL ESTERS 1 G/1
2 CAPSULE, LIQUID FILLED ORAL 2 TIMES DAILY
Qty: 360 CAPSULE | Refills: 3 | Status: SHIPPED | OUTPATIENT
Start: 2021-12-07 | End: 2022-11-16

## 2021-12-30 ENCOUNTER — TELEPHONE (OUTPATIENT)
Dept: FAMILY MEDICINE CLINIC | Facility: CLINIC | Age: 46
End: 2021-12-30

## 2021-12-30 NOTE — TELEPHONE ENCOUNTER
SENT MESSAGE-------CALL PHARMACY, NOT ME  Pharmacist Clinical Review History    This prescription has not been clinically reviewed.       E-Prescribing Status      Outpatient Medication Detail    methylPREDNISolone (MEDROL) 4 MG dose pack        Sig: follow package directions        Sent to pharmacy as: methylPREDNISolone 4 MG Oral Tablet Therapy Pack (MEDROL)        Class: Normal        Notes to Pharmacy: (put on file)        E-Prescribing Status: Receipt confirmed by pharmacy (12/6/2021  2:56 PM EST

## 2021-12-30 NOTE — TELEPHONE ENCOUNTER
Caller: Brandan Higuera    Relationship: Self    Best call back number: [phone]    What medication are you requesting: PREDNISONE    What are your current symptoms: GOUT    How long have you been experiencing symptoms: ALVA MATA    Have you had these symptoms before:    [x] Yes  [] No    Have you been treated for these symptoms before:   [x] Yes  [] No    If a prescription is needed, what is your preferred pharmacy and phone number: [id number] - [facility] [address] - [phone]  - [phone]     Additional notes: GOUT DID NOT RESOLVE WITH FIRST ROUND OF PREDNISONE. PLEASE ADVISE.

## 2022-01-03 ENCOUNTER — OFFICE VISIT (OUTPATIENT)
Dept: FAMILY MEDICINE CLINIC | Facility: CLINIC | Age: 47
End: 2022-01-03

## 2022-01-03 VITALS
DIASTOLIC BLOOD PRESSURE: 80 MMHG | HEIGHT: 68 IN | BODY MASS INDEX: 42.74 KG/M2 | SYSTOLIC BLOOD PRESSURE: 130 MMHG | WEIGHT: 282 LBS | HEART RATE: 90 BPM | TEMPERATURE: 97.5 F | OXYGEN SATURATION: 99 % | RESPIRATION RATE: 16 BRPM

## 2022-01-03 DIAGNOSIS — R94.6 BORDERLINE ABNORMAL THYROID FUNCTION TEST: ICD-10-CM

## 2022-01-03 DIAGNOSIS — E78.2 MIXED HYPERLIPIDEMIA: ICD-10-CM

## 2022-01-03 DIAGNOSIS — E55.9 VITAMIN D DEFICIENCY DISEASE: ICD-10-CM

## 2022-01-03 DIAGNOSIS — I10 PRIMARY HYPERTENSION: Primary | ICD-10-CM

## 2022-01-03 DIAGNOSIS — E53.8 LOW SERUM VITAMIN B12: ICD-10-CM

## 2022-01-03 DIAGNOSIS — M10.079 ACUTE IDIOPATHIC GOUT OF ANKLE, UNSPECIFIED LATERALITY: ICD-10-CM

## 2022-01-03 PROCEDURE — 99214 OFFICE O/P EST MOD 30 MIN: CPT | Performed by: PHYSICIAN ASSISTANT

## 2022-01-03 NOTE — PROGRESS NOTES
"Subjective   Brandan Higuera is a 46 y.o. male.     History of Present Illness    Since the last visit, he has overall felt well until recent gout flare.  He has Vitamin D deficiency and will update labs for continued management and Mixed hyperlipidemia with LDL goal met on labs from 12/6/2021 at level of 72 no triglycerides were 715 and did reach patient and started the generic Lovaza, also noted borderline thyroid labs plan to repeat when repeat his labs for B12 and he is now taking the B12 supplement will make sure this is enough orally to supplement with follow-up labs as well both planned for March 1.  He is here today because he had ran out of his blood pressure medicine and restarted it and today is follow-up for blood pressure check and goals are met his blood pressure is 130/80 I do not have to add any additional medicines for now and again still avoid hydrochlorothiazide due to his history of gout.  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  he has been compliant with current medications have reviewed them.  The patient denies medication side effects.  Will refill medications. /65 (BP Location: Left arm, Patient Position: Sitting, Cuff Size: Adult)   Pulse 90   Temp 97.5 °F (36.4 °C)   Resp 16   Ht 172.7 cm (67.99\")   Wt 128 kg (282 lb)   SpO2 99%   BMI 42.89 kg/m²     Results for orders placed or performed in visit on 12/06/21   Comprehensive metabolic panel    Specimen: Blood   Result Value Ref Range    Glucose 98 65 - 99 mg/dL    BUN 10 6 - 24 mg/dL    Creatinine 0.82 0.76 - 1.27 mg/dL    eGFR Non African Am 108 >59 mL/min/1.73    eGFR African Am 124 >59 mL/min/1.73    BUN/Creatinine Ratio 12 9 - 20    Sodium 142 134 - 144 mmol/L    Potassium 4.3 3.5 - 5.2 mmol/L    Chloride 105 96 - 106 mmol/L    Total CO2 21 20 - 29 mmol/L    Calcium 9.2 8.7 - 10.2 mg/dL    Total Protein 6.6 6.0 - 8.5 g/dL    Albumin 4.5 4.0 - 5.0 g/dL    Globulin 2.1 1.5 - 4.5 g/dL    A/G " Ratio 2.1 1.2 - 2.2    Total Bilirubin <0.2 0.0 - 1.2 mg/dL    Alkaline Phosphatase 76 44 - 121 IU/L    AST (SGOT) 30 0 - 40 IU/L    ALT (SGPT) 34 0 - 44 IU/L   Lipid panel    Specimen: Blood   Result Value Ref Range    Total Cholesterol 218 (H) 100 - 199 mg/dL    Triglycerides 715 (H) 0 - 149 mg/dL    HDL Cholesterol 35 (L) >39 mg/dL    VLDL Cholesterol Michael 111 (H) 5 - 40 mg/dL    LDL Chol Calc (NIH) 72 0 - 99 mg/dL   TSH    Specimen: Blood   Result Value Ref Range    TSH 1.760 0.450 - 4.500 uIU/mL   Hemoglobin A1c    Specimen: Blood   Result Value Ref Range    Hemoglobin A1C 5.4 4.8 - 5.6 %   T3, Free    Specimen: Blood   Result Value Ref Range    T3, Free 3.2 2.0 - 4.4 pg/mL   T4, Free    Specimen: Blood   Result Value Ref Range    Free T4 0.83 0.82 - 1.77 ng/dL   Vitamin B12    Specimen: Blood   Result Value Ref Range    Vitamin B-12 304 232 - 1,245 pg/mL   Folate    Specimen: Blood   Result Value Ref Range    Folate 18.4 >3.0 ng/mL   Vitamin D 25 Hydroxy    Specimen: Blood   Result Value Ref Range    25 Hydroxy, Vitamin D 24.6 (L) 30.0 - 100.0 ng/mL   CBC and Differential    Specimen: Blood   Result Value Ref Range    WBC 6.0 3.4 - 10.8 x10E3/uL    RBC 4.99 4.14 - 5.80 x10E6/uL    Hemoglobin 14.1 13.0 - 17.7 g/dL    Hematocrit 42.1 37.5 - 51.0 %    MCV 84 79 - 97 fL    MCH 28.3 26.6 - 33.0 pg    MCHC 33.5 31.5 - 35.7 g/dL    RDW 13.9 11.6 - 15.4 %    Platelets 169 150 - 450 x10E3/uL    Neutrophil Rel % 50 Not Estab. %    Lymphocyte Rel % 33 Not Estab. %    Monocyte Rel % 9 Not Estab. %    Eosinophil Rel % 7 Not Estab. %    Basophil Rel % 1 Not Estab. %    Neutrophils Absolute 3.1 1.4 - 7.0 x10E3/uL    Lymphocytes Absolute 2.0 0.7 - 3.1 x10E3/uL    Monocytes Absolute 0.5 0.1 - 0.9 x10E3/uL    Eosinophils Absolute 0.4 0.0 - 0.4 x10E3/uL    Basophils Absolute 0.1 0.0 - 0.2 x10E3/uL    Immature Granulocyte Rel % 0 Not Estab. %    Immature Grans Absolute 0.0 0.0 - 0.1 x10E3/uL     Sees psych---[facility]  No  observed apnea  To see Dr Malcolm tomorrow ------sees him tomorrow  Recent gout flare    Your triglycerides are very exciting.  Over 700.  There is concern went over 500 for possible pancreatitis.  I do want to send a prescription for prescription fish oil and if you are not able to get this want to know if there is an alternative medication that your pharmacist advises.  LDL cholesterol goal is to be less than 600 and goal was met at 72.  Also want a recheck thyroid labs with your lipids in 3 months due to borderline hypothyroid results. Labs show low Vitamin D levels.  I want you to add OTC Vitamin D 2,000 I.U. Daily.  A1c was in normal range and need you to work really really hard on losing weight and exercise  Vitamin B12 is in lower range.  Start over the counter B12 1,000 mcg daily.  If you have been taking B12 let me know and I will start injections      He is now on B12 oral!!!! Will try oral first and is on Lovaza------need f/u labs 3 mos and those borderline thyroid labs    The following portions of the patient's history were reviewed and updated as appropriate: allergies, current medications, past family history, past medical history, past social history, past surgical history and problem list.    Review of Systems   Constitutional: Negative for activity change, appetite change and unexpected weight change.   HENT: Negative for nosebleeds and trouble swallowing.    Eyes: Negative for pain and visual disturbance.   Respiratory: Negative for chest tightness, shortness of breath and wheezing.    Cardiovascular: Negative for chest pain and palpitations.   Gastrointestinal: Negative for abdominal pain and blood in stool.   Endocrine: Negative.    Genitourinary: Negative for difficulty urinating and hematuria.   Musculoskeletal: Positive for arthralgias and joint swelling.   Skin: Negative for color change and rash.   Allergic/Immunologic: Negative.    Neurological: Negative for syncope and speech difficulty.    Hematological: Negative for adenopathy.   Psychiatric/Behavioral: Negative for agitation and confusion.   All other systems reviewed and are negative.      Objective   Physical Exam  Vitals and nursing note reviewed.   Constitutional:       General: He is not in acute distress.     Appearance: He is well-developed. He is not diaphoretic.   HENT:      Head: Normocephalic.   Eyes:      General: No scleral icterus.     Conjunctiva/sclera: Conjunctivae normal.      Pupils: Pupils are equal, round, and reactive to light.   Cardiovascular:      Rate and Rhythm: Normal rate and regular rhythm.      Heart sounds: Normal heart sounds. No murmur heard.      Pulmonary:      Effort: Pulmonary effort is normal.      Breath sounds: Normal breath sounds.   Musculoskeletal:         General: Normal range of motion.      Cervical back: Normal range of motion and neck supple.   Skin:     General: Skin is warm and dry.      Findings: No rash.   Neurological:      General: No focal deficit present.      Mental Status: He is alert and oriented to person, place, and time.   Psychiatric:         Mood and Affect: Affect is not inappropriate.         Behavior: Behavior normal.         Thought Content: Thought content normal.         Judgment: Judgment normal.         Assessment/Plan   Diagnoses and all orders for this visit:    1. Primary hypertension (Primary)    2. Mixed hyperlipidemia    3. Vitamin D deficiency disease    4. Acute idiopathic gout of ankle, unspecified laterality  Comments:  sees Dr Malcolm    5. Low serum vitamin B12    6. Borderline abnormal thyroid function test      Labs March 1  Following up on borderline thyroid labs and does have family history with sister  Taking B12 orally and will make sure this level is at least above 500 on labs March 1  Continue taking Crestor working well to lower LDL cholesterol and now taking Lovaza for the triglycerides 715 with labs for this planned March 1  Must get weight down to help  prevent going on more medication  Has f/u DR Malcolm tomorrow ---gout

## 2022-01-03 NOTE — PATIENT INSTRUCTIONS

## 2022-04-05 RX ORDER — ICOSAPENT ETHYL 1000 MG/1
CAPSULE ORAL
Qty: 120 CAPSULE | Refills: 11 | Status: SHIPPED | OUTPATIENT
Start: 2022-04-05

## 2022-06-08 RX ORDER — AMLODIPINE BESYLATE 10 MG/1
10 TABLET ORAL DAILY
Qty: 30 TABLET | Refills: 0 | Status: SHIPPED | OUTPATIENT
Start: 2022-06-08 | End: 2022-07-05

## 2022-06-08 RX ORDER — LISINOPRIL 40 MG/1
TABLET ORAL
Qty: 30 TABLET | Refills: 0 | Status: SHIPPED | OUTPATIENT
Start: 2022-06-08 | End: 2022-07-05

## 2022-07-05 RX ORDER — LISINOPRIL 40 MG/1
TABLET ORAL
Qty: 15 TABLET | Refills: 0 | Status: SHIPPED | OUTPATIENT
Start: 2022-07-05 | End: 2022-09-15

## 2022-07-05 RX ORDER — AMLODIPINE BESYLATE 10 MG/1
10 TABLET ORAL DAILY
Qty: 15 TABLET | Refills: 0 | Status: SHIPPED | OUTPATIENT
Start: 2022-07-05 | End: 2022-09-15

## 2022-08-21 RX ORDER — ROSUVASTATIN CALCIUM 10 MG/1
TABLET, COATED ORAL
Qty: 90 TABLET | Refills: 3 | Status: SHIPPED | OUTPATIENT
Start: 2022-08-21 | End: 2022-10-20

## 2022-09-15 RX ORDER — LISINOPRIL 40 MG/1
TABLET ORAL
Qty: 15 TABLET | Refills: 0 | Status: SHIPPED | OUTPATIENT
Start: 2022-09-15 | End: 2022-10-20 | Stop reason: SDUPTHER

## 2022-09-15 RX ORDER — AMLODIPINE BESYLATE 10 MG/1
10 TABLET ORAL DAILY
Qty: 15 TABLET | Refills: 0 | Status: SHIPPED | OUTPATIENT
Start: 2022-09-15 | End: 2022-10-20 | Stop reason: SDUPTHER

## 2022-09-20 ENCOUNTER — TELEPHONE (OUTPATIENT)
Dept: FAMILY MEDICINE CLINIC | Facility: CLINIC | Age: 47
End: 2022-09-20

## 2022-09-20 DIAGNOSIS — R06.83 SNORING: Primary | ICD-10-CM

## 2022-09-20 NOTE — TELEPHONE ENCOUNTER
I will place order for a sleep study consult and he needs to see me to follow-up on his blood pressure

## 2022-09-20 NOTE — TELEPHONE ENCOUNTER
Caller: Lynda Higuera    Relationship: Emergency Contact    Best call back number: [phone]    What orders are you requesting (i.e. lab or imaging): SLEEP STUDY     In what timeframe would the patient need to come in: AS SOON AS POSSIBLE     Where will you receive your lab/imaging services: UNKNOWN     Additional notes: DID HIS YEARLY DOT PHYSICAL AND IT WAS RECOMMENDED HE HAVE A SLEEP STUDY DONE. WOULD LIKE TO KNOW IF IT CAN BE ORDERED FOR HIM TO SCHEDULE.

## 2022-09-27 RX ORDER — LISINOPRIL 40 MG/1
TABLET ORAL
Qty: 15 TABLET | Refills: 0 | OUTPATIENT
Start: 2022-09-27

## 2022-09-27 RX ORDER — AMLODIPINE BESYLATE 10 MG/1
10 TABLET ORAL DAILY
Qty: 15 TABLET | Refills: 0 | OUTPATIENT
Start: 2022-09-27

## 2022-10-20 ENCOUNTER — OFFICE VISIT (OUTPATIENT)
Dept: FAMILY MEDICINE CLINIC | Facility: CLINIC | Age: 47
End: 2022-10-20

## 2022-10-20 VITALS
WEIGHT: 292 LBS | HEIGHT: 68 IN | RESPIRATION RATE: 16 BRPM | DIASTOLIC BLOOD PRESSURE: 82 MMHG | HEART RATE: 92 BPM | OXYGEN SATURATION: 100 % | TEMPERATURE: 97.5 F | SYSTOLIC BLOOD PRESSURE: 138 MMHG | BODY MASS INDEX: 44.25 KG/M2

## 2022-10-20 DIAGNOSIS — E03.9 HYPOTHYROIDISM, ACQUIRED: ICD-10-CM

## 2022-10-20 DIAGNOSIS — M10.9 GOUT, UNSPECIFIED CAUSE, UNSPECIFIED CHRONICITY, UNSPECIFIED SITE: ICD-10-CM

## 2022-10-20 DIAGNOSIS — E53.8 LOW SERUM VITAMIN B12: ICD-10-CM

## 2022-10-20 DIAGNOSIS — I10 PRIMARY HYPERTENSION: Primary | ICD-10-CM

## 2022-10-20 DIAGNOSIS — E78.2 MIXED HYPERLIPIDEMIA: ICD-10-CM

## 2022-10-20 DIAGNOSIS — Z12.11 SCREEN FOR COLON CANCER: ICD-10-CM

## 2022-10-20 DIAGNOSIS — E55.9 VITAMIN D DEFICIENCY DISEASE: ICD-10-CM

## 2022-10-20 PROCEDURE — 99214 OFFICE O/P EST MOD 30 MIN: CPT | Performed by: PHYSICIAN ASSISTANT

## 2022-10-20 PROCEDURE — 93000 ELECTROCARDIOGRAM COMPLETE: CPT | Performed by: PHYSICIAN ASSISTANT

## 2022-10-20 RX ORDER — LISINOPRIL 40 MG/1
40 TABLET ORAL DAILY
Qty: 90 TABLET | Refills: 1 | Status: SHIPPED | OUTPATIENT
Start: 2022-10-20

## 2022-10-20 RX ORDER — AMLODIPINE BESYLATE 10 MG/1
10 TABLET ORAL DAILY
Qty: 90 TABLET | Refills: 1 | Status: SHIPPED | OUTPATIENT
Start: 2022-10-20

## 2022-10-20 NOTE — PROGRESS NOTES
"Subjective   Brandan Higuera is a 47 y.o. male.     History of Present Illness      Since the last visit, he has overall felt well.  He has Primary Hypertension and well controlled on current medication, Vitamin D deficiency and will update labs for continued management and AlsoMixed hyperlipidemia we will confirm LDL cholesterol less than 100 on labs.  Vitamin D and B12 deficiency on the supplements will also confirm lab goals met.  Update uric acid level due to gout suppressive medications from Dr. Malcolm.  he has been compliant with current medications have reviewed them.  The patient denies medication side effects.  Will refill medications. /80 (BP Location: Left arm, Patient Position: Sitting, Cuff Size: Adult)   Pulse 92   Temp 97.5 °F (36.4 °C) (Oral)   Resp 16   Ht 172.7 cm (67.99\")   Wt 132 kg (292 lb)   SpO2 100%   BMI 44.41 kg/m²   Trying to get weight down  Results for orders placed or performed in visit on 12/06/21   Comprehensive metabolic panel    Specimen: Blood   Result Value Ref Range    Glucose 98 65 - 99 mg/dL    BUN 10 6 - 24 mg/dL    Creatinine 0.82 0.76 - 1.27 mg/dL    eGFR Non African Am 108 >59 mL/min/1.73    eGFR African Am 124 >59 mL/min/1.73    BUN/Creatinine Ratio 12 9 - 20    Sodium 142 134 - 144 mmol/L    Potassium 4.3 3.5 - 5.2 mmol/L    Chloride 105 96 - 106 mmol/L    Total CO2 21 20 - 29 mmol/L    Calcium 9.2 8.7 - 10.2 mg/dL    Total Protein 6.6 6.0 - 8.5 g/dL    Albumin 4.5 4.0 - 5.0 g/dL    Globulin 2.1 1.5 - 4.5 g/dL    A/G Ratio 2.1 1.2 - 2.2    Total Bilirubin <0.2 0.0 - 1.2 mg/dL    Alkaline Phosphatase 76 44 - 121 IU/L    AST (SGOT) 30 0 - 40 IU/L    ALT (SGPT) 34 0 - 44 IU/L   Lipid panel    Specimen: Blood   Result Value Ref Range    Total Cholesterol 218 (H) 100 - 199 mg/dL    Triglycerides 715 (H) 0 - 149 mg/dL    HDL Cholesterol 35 (L) >39 mg/dL    VLDL Cholesterol Michael 111 (H) 5 - 40 mg/dL    LDL Chol Calc (NIH) 72 0 - 99 mg/dL   TSH    Specimen: Blood "   Result Value Ref Range    TSH 1.760 0.450 - 4.500 uIU/mL   Hemoglobin A1c    Specimen: Blood   Result Value Ref Range    Hemoglobin A1C 5.4 4.8 - 5.6 %   T3, Free    Specimen: Blood   Result Value Ref Range    T3, Free 3.2 2.0 - 4.4 pg/mL   T4, Free    Specimen: Blood   Result Value Ref Range    Free T4 0.83 0.82 - 1.77 ng/dL   Vitamin B12    Specimen: Blood   Result Value Ref Range    Vitamin B-12 304 232 - 1,245 pg/mL   Folate    Specimen: Blood   Result Value Ref Range    Folate 18.4 >3.0 ng/mL   Vitamin D 25 Hydroxy    Specimen: Blood   Result Value Ref Range    25 Hydroxy, Vitamin D 24.6 (L) 30.0 - 100.0 ng/mL   CBC and Differential    Specimen: Blood   Result Value Ref Range    WBC 6.0 3.4 - 10.8 x10E3/uL    RBC 4.99 4.14 - 5.80 x10E6/uL    Hemoglobin 14.1 13.0 - 17.7 g/dL    Hematocrit 42.1 37.5 - 51.0 %    MCV 84 79 - 97 fL    MCH 28.3 26.6 - 33.0 pg    MCHC 33.5 31.5 - 35.7 g/dL    RDW 13.9 11.6 - 15.4 %    Platelets 169 150 - 450 x10E3/uL    Neutrophil Rel % 50 Not Estab. %    Lymphocyte Rel % 33 Not Estab. %    Monocyte Rel % 9 Not Estab. %    Eosinophil Rel % 7 Not Estab. %    Basophil Rel % 1 Not Estab. %    Neutrophils Absolute 3.1 1.4 - 7.0 x10E3/uL    Lymphocytes Absolute 2.0 0.7 - 3.1 x10E3/uL    Monocytes Absolute 0.5 0.1 - 0.9 x10E3/uL    Eosinophils Absolute 0.4 0.0 - 0.4 x10E3/uL    Basophils Absolute 0.1 0.0 - 0.2 x10E3/uL    Immature Granulocyte Rel % 0 Not Estab. %    Immature Grans Absolute 0.0 0.0 - 0.1 x10E3/uL      Gi Phan PA-C   12/7/2021  8:48 AM EST       Your triglycerides are very exciting.  Over 700.  There is concern went over 500 for possible pancreatitis.  I do want to send a prescription for prescription fish oil and if you are not able to get this want to know if there is an alternative medication that your pharmacist advises.  LDL cholesterol goal is to be less than 600 and goal was met at 72.  Also want a recheck thyroid labs with your lipids in 3 months due to  borderline hypothyroid results. Labs show low Vitamin D levels.  I want you to add OTC Vitamin D 2,000 I.U. Daily.  A1c was in normal range and need you to work really really hard on losing weight and exercise  Vitamin B12 is in lower range.  Start over the counter B12 1,000 mcg daily.  If you have been taking B12 let me know and I will start injections.     Remains on colchicine and allopurinol for gout suppression and has Medrol Dosepak for flareups and note Crestor was discontinued due to interaction with colchicine    Here with spouse    To have sleep study----has appt    Now sees Heather with [facility] for psychiatry and meds working well  Still sees Dr. Malcolm rheumatologist for his severe gout    Eye keratitis 3-24-22--resolved     Need f.u on B12 and trigs---had to stop Crestor to take colchicine  Aunt colon cancer-----(P)  The following portions of the patient's history were reviewed and updated as appropriate: allergies, current medications, past family history, past medical history, past social history, past surgical history and problem list.    Review of Systems   Constitutional: Negative for activity change, appetite change and unexpected weight change.   HENT: Negative for nosebleeds and trouble swallowing.    Eyes: Negative for pain and visual disturbance.   Respiratory: Negative for chest tightness, shortness of breath and wheezing.    Cardiovascular: Negative for chest pain and palpitations.   Gastrointestinal: Negative for abdominal pain and blood in stool.   Endocrine: Negative.    Genitourinary: Negative for difficulty urinating and hematuria.   Musculoskeletal: Negative for joint swelling.   Skin: Negative for color change and rash.   Allergic/Immunologic: Negative.    Neurological: Negative for syncope and speech difficulty.   Hematological: Negative for adenopathy.   Psychiatric/Behavioral: Negative for agitation and confusion.   All other systems reviewed and are  negative.      Objective   Physical Exam  Vitals and nursing note reviewed.   Constitutional:       General: He is not in acute distress.     Appearance: He is well-developed. He is not diaphoretic.   HENT:      Head: Normocephalic.   Eyes:      Conjunctiva/sclera: Conjunctivae normal.      Pupils: Pupils are equal, round, and reactive to light.   Cardiovascular:      Rate and Rhythm: Normal rate and regular rhythm.      Heart sounds: Normal heart sounds. No murmur heard.  Pulmonary:      Effort: Pulmonary effort is normal.      Breath sounds: Normal breath sounds.   Musculoskeletal:         General: Normal range of motion.      Cervical back: Normal range of motion and neck supple.   Skin:     General: Skin is warm and dry.      Findings: No rash.   Neurological:      Mental Status: He is alert and oriented to person, place, and time.   Psychiatric:         Mood and Affect: Affect is not inappropriate.         Behavior: Behavior normal.         Thought Content: Thought content normal.         Judgment: Judgment normal.         Assessment & Plan   Diagnoses and all orders for this visit:    1. Primary hypertension (Primary)    2. Gout, unspecified cause, unspecified chronicity, unspecified site  Comments:  On allopurinol and colchicine per Dr. Malcolm    3. Vitamin D deficiency disease    4. Mixed hyperlipidemia    5. Low serum vitamin B12      Screen colon cancer----  No longer smoking  Note Crestor was discontinued due to the addition of colchicine from Dr. Malcolm rheumatologist for his severe gout  To have sleep study----has appt  Baseline EKG  Now sees Heather with [facility] for psychiatry and meds working well  Still sees Dr. Malcolm rheumatologist for his severe gout  Normal EKG performed today for diagnosis hypertension

## 2022-10-20 NOTE — PROGRESS NOTES
Procedure     ECG 12 Lead    Date/Time: 10/20/2022 2:43 PM  Performed by: Gi Phan PA-C  Authorized by: Gi Phan PA-C   Comparison: not compared with previous ECG   Previous ECG: no previous ECG available  Rhythm: sinus rhythm  Rate: normal  BPM: 82  Conduction: conduction normal  ST Segments: ST segments normal  T Waves: T waves normal  QRS axis: normal  Other: no other findings  Lead: right-sided leads used    Clinical impression: normal ECG  Comments: EKG Interpretation Report    Heart rate:    82 beats/min, DE interval:  146 msec, QRS duration:  98 msec  QTu:370 msec, QTc:  432 msec

## 2022-10-21 LAB
25(OH)D3+25(OH)D2 SERPL-MCNC: 47.9 NG/ML (ref 30–100)
ALBUMIN SERPL-MCNC: 5.1 G/DL (ref 3.5–5.2)
ALBUMIN/GLOB SERPL: 2.6 G/DL
ALP SERPL-CCNC: 82 U/L (ref 39–117)
ALT SERPL-CCNC: 43 U/L (ref 1–41)
APPEARANCE UR: CLEAR
AST SERPL-CCNC: 35 U/L (ref 1–40)
BACTERIA #/AREA URNS HPF: NORMAL /HPF
BASOPHILS # BLD AUTO: 0.06 10*3/MM3 (ref 0–0.2)
BASOPHILS NFR BLD AUTO: 1.1 % (ref 0–1.5)
BILIRUB SERPL-MCNC: 0.2 MG/DL (ref 0–1.2)
BILIRUB UR QL STRIP: NEGATIVE
BUN SERPL-MCNC: 12 MG/DL (ref 6–20)
BUN/CREAT SERPL: 12.1 (ref 7–25)
CALCIUM SERPL-MCNC: 10.3 MG/DL (ref 8.6–10.5)
CASTS URNS MICRO: NORMAL
CHLORIDE SERPL-SCNC: 102 MMOL/L (ref 98–107)
CHOLEST SERPL-MCNC: 194 MG/DL (ref 0–200)
CO2 SERPL-SCNC: 27 MMOL/L (ref 22–29)
COLOR UR: YELLOW
CREAT SERPL-MCNC: 0.99 MG/DL (ref 0.76–1.27)
CRYSTALS URNS MICRO: NORMAL
EGFRCR SERPLBLD CKD-EPI 2021: 95.7 ML/MIN/1.73
EOSINOPHIL # BLD AUTO: 0.53 10*3/MM3 (ref 0–0.4)
EOSINOPHIL NFR BLD AUTO: 9.4 % (ref 0.3–6.2)
EPI CELLS #/AREA URNS HPF: NORMAL /HPF
ERYTHROCYTE [DISTWIDTH] IN BLOOD BY AUTOMATED COUNT: 13.9 % (ref 12.3–15.4)
FOLATE SERPL-MCNC: 19.4 NG/ML (ref 4.78–24.2)
GLOBULIN SER CALC-MCNC: 2 GM/DL
GLUCOSE SERPL-MCNC: 111 MG/DL (ref 65–99)
GLUCOSE UR QL STRIP: NEGATIVE
HBA1C MFR BLD: 5.6 % (ref 4.8–5.6)
HCT VFR BLD AUTO: 44.5 % (ref 37.5–51)
HDLC SERPL-MCNC: 40 MG/DL (ref 40–60)
HGB BLD-MCNC: 15.3 G/DL (ref 13–17.7)
HGB UR QL STRIP: NEGATIVE
IMM GRANULOCYTES # BLD AUTO: 0.01 10*3/MM3 (ref 0–0.05)
IMM GRANULOCYTES NFR BLD AUTO: 0.2 % (ref 0–0.5)
KETONES UR QL STRIP: ABNORMAL
LDLC SERPL CALC-MCNC: 91 MG/DL (ref 0–100)
LEUKOCYTE ESTERASE UR QL STRIP: NEGATIVE
LYMPHOCYTES # BLD AUTO: 2.17 10*3/MM3 (ref 0.7–3.1)
LYMPHOCYTES NFR BLD AUTO: 38.5 % (ref 19.6–45.3)
MCH RBC QN AUTO: 28.4 PG (ref 26.6–33)
MCHC RBC AUTO-ENTMCNC: 34.4 G/DL (ref 31.5–35.7)
MCV RBC AUTO: 82.7 FL (ref 79–97)
MONOCYTES # BLD AUTO: 0.46 10*3/MM3 (ref 0.1–0.9)
MONOCYTES NFR BLD AUTO: 8.2 % (ref 5–12)
NEUTROPHILS # BLD AUTO: 2.41 10*3/MM3 (ref 1.7–7)
NEUTROPHILS NFR BLD AUTO: 42.6 % (ref 42.7–76)
NITRITE UR QL STRIP: NEGATIVE
NRBC BLD AUTO-RTO: 0 /100 WBC (ref 0–0.2)
PH UR STRIP: 5.5 [PH] (ref 5–8)
PLATELET # BLD AUTO: 176 10*3/MM3 (ref 140–450)
POTASSIUM SERPL-SCNC: 4 MMOL/L (ref 3.5–5.2)
PROT SERPL-MCNC: 7.1 G/DL (ref 6–8.5)
PROT UR QL STRIP: ABNORMAL
RBC # BLD AUTO: 5.38 10*6/MM3 (ref 4.14–5.8)
RBC #/AREA URNS HPF: NORMAL /HPF
SODIUM SERPL-SCNC: 142 MMOL/L (ref 136–145)
SP GR UR STRIP: 1.02 (ref 1–1.03)
T3FREE SERPL-MCNC: 3.3 PG/ML (ref 2–4.4)
T4 FREE SERPL-MCNC: 0.84 NG/DL (ref 0.93–1.7)
TRIGL SERPL-MCNC: 380 MG/DL (ref 0–150)
TSH SERPL DL<=0.005 MIU/L-ACNC: 0.85 UIU/ML (ref 0.27–4.2)
URATE SERPL-MCNC: 7.5 MG/DL (ref 3.4–7)
UROBILINOGEN UR STRIP-MCNC: ABNORMAL MG/DL
VIT B12 SERPL-MCNC: 593 PG/ML (ref 211–946)
VLDLC SERPL CALC-MCNC: 63 MG/DL (ref 5–40)
WBC # BLD AUTO: 5.64 10*3/MM3 (ref 3.4–10.8)
WBC #/AREA URNS HPF: NORMAL /HPF

## 2022-10-21 RX ORDER — LEVOTHYROXINE SODIUM 0.03 MG/1
TABLET ORAL
Qty: 90 TABLET | Refills: 3 | Status: SHIPPED | OUTPATIENT
Start: 2022-10-21

## 2022-10-21 RX ORDER — ROSUVASTATIN CALCIUM 10 MG/1
10 TABLET, COATED ORAL DAILY
Qty: 90 TABLET | Refills: 3 | Status: SHIPPED | OUTPATIENT
Start: 2022-10-21

## 2022-11-16 RX ORDER — OMEGA-3-ACID ETHYL ESTERS 1 G/1
2 CAPSULE, LIQUID FILLED ORAL 2 TIMES DAILY
Qty: 360 CAPSULE | Refills: 3 | Status: SHIPPED | OUTPATIENT
Start: 2022-11-16

## 2023-01-18 ENCOUNTER — OFFICE VISIT (OUTPATIENT)
Dept: SLEEP MEDICINE | Facility: HOSPITAL | Age: 48
End: 2023-01-18
Payer: COMMERCIAL

## 2023-01-18 VITALS — BODY MASS INDEX: 44.25 KG/M2 | WEIGHT: 292 LBS | OXYGEN SATURATION: 98 % | HEIGHT: 68 IN | HEART RATE: 78 BPM

## 2023-01-18 DIAGNOSIS — G47.26 CIRCADIAN RHYTHM SLEEP DISORDER, SHIFT WORK TYPE: ICD-10-CM

## 2023-01-18 DIAGNOSIS — E66.01 MORBID OBESITY: ICD-10-CM

## 2023-01-18 DIAGNOSIS — I10 HYPERTENSION, UNSPECIFIED TYPE: ICD-10-CM

## 2023-01-18 DIAGNOSIS — R06.83 SNORING: ICD-10-CM

## 2023-01-18 DIAGNOSIS — Z91.89 AT RISK FOR SLEEP APNEA: Primary | ICD-10-CM

## 2023-01-18 PROCEDURE — G0463 HOSPITAL OUTPT CLINIC VISIT: HCPCS

## 2023-01-18 PROCEDURE — 99204 OFFICE O/P NEW MOD 45 MIN: CPT | Performed by: FAMILY MEDICINE

## 2023-01-18 NOTE — PROGRESS NOTES
[facility]Consultation       Reason for Consultation: Snoring      Patient Care Team:  Gi Phan PA-C as PCP - General (Family Medicine)  Brandan Malcolm MD as Consulting Physician (Rheumatology)  Farnaz Duarte APRN (Nurse Practitioner)  Edward Jimenez MD as Consulting Physician (Sleep Medicine)      History of present illness:  Thank you for asking me to see your patient.  The patient is a 47 y.o. male with bipolar disorder hypothyroidism Hypertension hyperlipidemia presents with concern for sleep disorder.  Sleep study 15 years ago.  Not prescribed PAP breathing machine.  No tonsillectomy.  Patient works night shift.  Patient reports weight gain of 30 pounds over the past 5 years urge sensations where movement helps.  No family history of sleep apnea is aware.  Morbidly obese BMI 44.4.  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    Bedtime 10 AM sleep latency 15 to 20 minutes wake time 6 PM to 7 PM sleeps around 8 hours 0 naps no rotating shifts.      Social History: No tobacco alcohol or drug use 4 caffeine beverages a day    Allergies:  Penicillins    Family History: MIKAL y - mother (intolerant to PAP)       Current Outpatient Medications:   •  allopurinol (ZYLOPRIM) 300 MG tablet, Take 1 tablet by mouth Daily. For gout suppression, Disp: 90 tablet, Rfl: 3  •  amLODIPine (NORVASC) 10 MG tablet, Take 1 tablet by mouth Daily. for blood pressure, Disp: 90 tablet, Rfl: 1  •  Cholecalciferol 50 MCG (2000 UT) capsule, One PO daily, Disp: 90 each, Rfl: 3  •  colchicine 0.6 MG tablet, , Disp: , Rfl:   •  Cyanocobalamin 1000 MCG capsule, 1 PO daily, Disp: 90 capsule, Rfl: 3  •  escitalopram (LEXAPRO) 10 MG tablet, TAKE 1 TABLET BY MOUTH DAILY, Disp: , Rfl: 1  •  ibuprofen (ADVIL,MOTRIN) 800 MG tablet, , Disp: , Rfl:   •  lamoTRIgine (LaMICtal) 100 MG tablet, TAKE 1 TABLET BY MOUTH AT BEDTIME, Disp: , Rfl: 1  •  levothyroxine (SYNTHROID, LEVOTHROID) 25 MCG tablet, One PO daily before breakfast for  "thyroid, Disp: 90 tablet, Rfl: 3  •  lisinopril (PRINIVIL,ZESTRIL) 40 MG tablet, Take 1 tablet by mouth Daily. for blood pressure., Disp: 90 tablet, Rfl: 1  •  methylPREDNISolone (MEDROL) 4 MG dose pack, follow package directions, Disp: 21 tablet, Rfl: 0  •  omega-3 acid ethyl esters (LOVAZA) 1 g capsule, TAKE 2 CAPSULES BY MOUTH 2 (TWO) TIMES A DAY. FOR TRIGLYCERIDES, Disp: 360 capsule, Rfl: 3  •  rosuvastatin (CRESTOR) 10 MG tablet, Take 1 tablet by mouth Daily. For cholesterol and patient aware this medication in combination with colchicine can increase his risk of myalgias----stop and call if develop, Disp: 90 tablet, Rfl: 3  •  Vascepa 1 g capsule capsule, TAKE 2 CAPSULES BY MOUTH TWICE A DAY WITH MEALS, Disp: 120 capsule, Rfl: 11    Vital Signs:    Vitals:    01/18/23 0900   Pulse: 78   SpO2: 98%   Weight: 132 kg (292 lb)   Height: 172.7 cm (68\")      Body mass index is 44.4 kg/m².  Neck Circumference: 20.5 inches      REVIEW OF SYSTEMS.  Full review of systems available on the intake form which is scanned in the media tab.  The relevant positive are noted below  1. Daytime excessive sleepiness with Hobson Sleepiness Scale :Total score: 4   2. Snoring  3. Nasal congestion depression      Physical exam:  Vitals:    01/18/23 0900   Pulse: 78   SpO2: 98%   Weight: 132 kg (292 lb)   Height: 172.7 cm (68\")    Body mass index is 44.4 kg/m². Neck Circumference: 20.5 inches  HEENT: Head is atraumatic, normocephalic  Eyes: pupils are round equal and reacting to light and accommodation, conjunctiva normal  NECK:Neck Circumference: 20.5 inches  RESPIRATORY SYSTEM: Regular respirations  CARDIOVASULAR SYSTEM: Regular rate  EXTREMITES: No cyanosis, clubbing  NEUROLOGICAL SYSTEM: Oriented x 3, no gross motor defects, gait normal      Impression:  1. At risk for sleep apnea    2. Morbid obesity (HCC)    3. Circadian rhythm sleep disorder, shift work type    4. Snoring    5. Hypertension, unspecified type        Plan:    Good " sleep hygiene measures should be maintained.  Weight loss would be beneficial in this patient who is morbidly obese BMI 44.4.    I discussed the pathophysiology of obstructive sleep apnea with the patient.  We discussed the adverse outcomes associated with untreated sleep-disordered breathing.  We discussed treatment modalities of obstructive sleep apnea including CPAP device. Sleep study will be scheduled to establish definitive diagnosis of sleep disorder breathing.  Weight loss will be strongly beneficial in order to reduce the severity of sleep-disordered breathing.Caution during activities that require prolonged concentration is strongly advised.  Patient will be notified of sleep study results after sleep study is completed.  If sleep apnea is only mild,  oral mandibular advancement device may be one of the treatment options.  However if sleep apnea is moderately severe, CPAP treatment will be strongly encouraged.  The patient is not opposed to treatment with CPAP device if we confirm significant obstructive sleep apnea on polysomnography.     Thank you for allowing me to participate in your patient's care.    Edward Jimenez MD  Sleep Medicine  01/18/23  10:48 EST

## 2023-04-11 ENCOUNTER — TELEMEDICINE (OUTPATIENT)
Dept: FAMILY MEDICINE CLINIC | Facility: CLINIC | Age: 48
End: 2023-04-11
Payer: COMMERCIAL

## 2023-04-11 DIAGNOSIS — U07.1 COVID-19: Primary | ICD-10-CM

## 2023-04-11 DIAGNOSIS — J02.9 SORE THROAT: ICD-10-CM

## 2023-04-11 DIAGNOSIS — R13.10 PAIN WITH SWALLOWING: ICD-10-CM

## 2023-04-11 RX ORDER — PREDNISONE 20 MG/1
20 TABLET ORAL 2 TIMES DAILY
Qty: 10 TABLET | Refills: 0 | Status: SHIPPED | OUTPATIENT
Start: 2023-04-11 | End: 2023-04-16

## 2023-04-11 RX ORDER — ALLOPURINOL 100 MG/1
TABLET ORAL
COMMUNITY
Start: 2023-02-14

## 2023-04-11 RX ORDER — AZITHROMYCIN 250 MG/1
TABLET, FILM COATED ORAL
COMMUNITY
Start: 2023-04-09

## 2023-04-11 NOTE — LETTER
April 11, 2023     Patient: Brandan Higuera   YOB: 1975   Date of Visit: 4/11/2023       To Whom It May Concern:    It is my medical opinion that Brandan Higuera should be excused from work due to Covid-19, and may return to work on Monday 4/17/2023.            Sincerely,        DESHAWN Gaines    CC:   No Recipients

## 2023-04-11 NOTE — PROGRESS NOTES
Mode of Visit: Video  You have chosen to receive care through a telehealth visit.  Do you consent to use a video/audio connection for your medical care today? Yes   The visit included audio and video interaction. No technical issues occurred during this visit.     Location of patient: Home  Location of provider: [facility]     Subjective       Chief Complaint   Patient presents with   • Covid-19 Home Monitoring Video Visit         HPI:        Brandan is a 47 y.o. male who presents to  [facility]  [facility] today for follow-up on COVID-19.  The patient was seen at [facility] on 4/9/2023 reporting fever, cough, ear pain, sinus issue, and sore throat for 5 days.  Rapid strep screening was negative, influenza a and B were both negative, and COVID test was positive.  The patient was given a prescription for Azithromycin (Zithromax) 250 mg Z-Charli dosing.    Symptoms first started one week ago.  He has completed the required quarantine.      Today, the patient reports ongoing sore throat, congestion in the ears, and pain with swallowing.  He has two more doses left of the Z-pack.  He is able to swallow liquids.  He hs been drinking as many fluids as he can, but he is having a difficult time eating due to throat pain.  He denies chest pain, shortness of breath, wheezing, palpitations, fever, chills, inability to swallow, lightheadedness, and dizziness.    He denies recent elevated blood pressure.  No diabetes.  He has taken oral Prednisone before and tolerated it well.      Review of Systems   Constitutional: Negative for appetite change, chills, fatigue and fever.   HENT: Positive for congestion and sore throat. Negative for ear discharge, ear pain, hearing loss, sinus pressure, tinnitus and trouble swallowing.    Eyes: Negative for visual disturbance.   Respiratory: Negative for cough, chest tightness,  shortness of breath and wheezing.    Cardiovascular: Negative for chest pain, palpitations and leg swelling.   Gastrointestinal: Negative for abdominal pain, constipation, diarrhea, nausea and vomiting.   Genitourinary: Negative for dysuria, frequency and urgency.   Musculoskeletal: Negative for gait problem, myalgias and neck pain.   Skin: Negative for rash.   Neurological: Negative for dizziness, syncope, weakness and light-headedness.   Psychiatric/Behavioral: Negative for dysphoric mood. The patient is not nervous/anxious.             PE:   Objective   There were no vitals filed for this visit.     There is no height or weight on file to calculate BMI.    Physical Exam   Constitutional: He appears well-developed and well-nourished. No distress.   HENT:   Head: Normocephalic.   Mouth/Throat: Mouth/Lips are normal.  Unable to visualize throat due to video appointment.   Neck: Neck normal appearance.  Pulmonary/Chest: Effort normal.  No respiratory distress. He no audible wheeze...  Neurological: He is alert.   Psychiatric: He has a normal mood and affect. He mood appears normal.                             A/P:     Assessment & Plan   Diagnoses and all orders for this visit:    1. COVID-19 (Primary)  -     predniSONE (DELTASONE) 20 MG tablet; Take 1 tablet by mouth 2 (Two) Times a Day for 5 days.  Dispense: 10 tablet; Refill: 0    2. Sore throat  -     predniSONE (DELTASONE) 20 MG tablet; Take 1 tablet by mouth 2 (Two) Times a Day for 5 days.  Dispense: 10 tablet; Refill: 0    3. Pain with swallowing  -     predniSONE (DELTASONE) 20 MG tablet; Take 1 tablet by mouth 2 (Two) Times a Day for 5 days.  Dispense: 10 tablet; Refill: 0        I spent 15 minutes caring for Brandan on this date of service. This time includes time spent by me in the following activities:preparing for the visit, reviewing tests, obtaining and/or reviewing a separately obtained history, performing a medically appropriate examination and/or  evaluation , counseling and educating the patient/family/caregiver, ordering medications, tests, or procedures and documenting information in the medical record     Follow up:   Return if symptoms worsen or fail to improve.      -Take medication as prescribed.  Continue Z-Charli until completed.  -Monitor blood pressure while taking Prednisone.  -Gargle with warm salt water 3-4 times per day.  -Monitor for fever and take Tylenol as needed.  Drink plenty of fluids and get plenty of rest.  -Use cool-mist humidifier as needed.  -Seek immediate medical attention for fever unrelieved by Tylenol, inability to swallow, chest pain, shortness of breath, decreased oxygen saturations, sharp pain with breathing, or any other worsening signs or symptoms.  -He has completed 5 days of quarantine.  He will wear a tight fitting mask for an additional 5 days.  -Return to the office is symptoms worsen or do not improve.

## 2023-05-07 RX ORDER — LISINOPRIL 40 MG/1
40 TABLET ORAL DAILY
Qty: 30 TABLET | Refills: 0 | Status: SHIPPED | OUTPATIENT
Start: 2023-05-07

## 2023-05-07 RX ORDER — AMLODIPINE BESYLATE 10 MG/1
10 TABLET ORAL DAILY
Qty: 30 TABLET | Refills: 0 | Status: SHIPPED | OUTPATIENT
Start: 2023-05-07

## 2023-07-30 RX ORDER — ICOSAPENT ETHYL 1000 MG/1
CAPSULE ORAL
Qty: 120 CAPSULE | Refills: 11 | Status: SHIPPED | OUTPATIENT
Start: 2023-07-30

## 2023-08-17 RX ORDER — LISINOPRIL 40 MG/1
TABLET ORAL
Qty: 15 TABLET | Refills: 0 | Status: SHIPPED | OUTPATIENT
Start: 2023-08-17

## 2023-08-17 RX ORDER — AMLODIPINE BESYLATE 10 MG/1
TABLET ORAL
Qty: 15 TABLET | Refills: 0 | Status: SHIPPED | OUTPATIENT
Start: 2023-08-17

## 2023-08-17 NOTE — TELEPHONE ENCOUNTER
Sent a 15 day RX to pharm. No more refills without an appointment.    Okay for hub relay message       Rx Refill Note  Requested Prescriptions     Pending Prescriptions Disp Refills    amLODIPine (NORVASC) 10 MG tablet [Pharmacy Med Name: AMLODIPINE BESYLATE 10 MG TAB] 15 tablet 0     Sig: TAKE 1 TABLET BY MOUTH EVERY DAY FOR BLOOD PRESSURE    lisinopril (PRINIVIL,ZESTRIL) 40 MG tablet [Pharmacy Med Name: LISINOPRIL 40 MG TABLET] 15 tablet 0     Sig: TAKE 1 TABLET BY MOUTH EVERY DAY FOR BLOOD PRESSURE      Last office visit with prescribing clinician: 10/20/2022   Last telemedicine visit with prescribing clinician: 4/11/2023   Next office visit with prescribing clinician: Visit date not found                         Would you like a call back once the refill request has been completed: [] Yes [] No    If the office needs to give you a call back, can they leave a voicemail: [] Yes [] No    Lalo Haas MA  08/17/23, 09:42 EDT

## 2023-08-24 RX ORDER — LISINOPRIL 40 MG/1
TABLET ORAL
Qty: 15 TABLET | Refills: 0 | Status: SHIPPED | OUTPATIENT
Start: 2023-08-24

## 2023-08-24 RX ORDER — AMLODIPINE BESYLATE 10 MG/1
TABLET ORAL
Qty: 15 TABLET | Refills: 0 | Status: SHIPPED | OUTPATIENT
Start: 2023-08-24

## 2023-09-13 RX ORDER — AMLODIPINE BESYLATE 10 MG/1
TABLET ORAL
Qty: 15 TABLET | Refills: 0 | Status: SHIPPED | OUTPATIENT
Start: 2023-09-13

## 2023-09-13 RX ORDER — LISINOPRIL 40 MG/1
TABLET ORAL
Qty: 15 TABLET | Refills: 0 | Status: SHIPPED | OUTPATIENT
Start: 2023-09-13

## 2023-09-21 RX ORDER — LISINOPRIL 40 MG/1
TABLET ORAL
Qty: 15 TABLET | Refills: 0 | Status: SHIPPED | OUTPATIENT
Start: 2023-09-21

## 2023-09-21 RX ORDER — AMLODIPINE BESYLATE 10 MG/1
TABLET ORAL
Qty: 15 TABLET | Refills: 0 | Status: SHIPPED | OUTPATIENT
Start: 2023-09-21

## 2023-10-18 ENCOUNTER — OFFICE VISIT (OUTPATIENT)
Dept: SLEEP MEDICINE | Facility: HOSPITAL | Age: 48
End: 2023-10-18
Payer: COMMERCIAL

## 2023-10-18 VITALS — BODY MASS INDEX: 42.34 KG/M2 | HEART RATE: 81 BPM | WEIGHT: 279.4 LBS | OXYGEN SATURATION: 96 % | HEIGHT: 68 IN

## 2023-10-18 DIAGNOSIS — R06.83 SNORING: ICD-10-CM

## 2023-10-18 DIAGNOSIS — E66.01 MORBID OBESITY: ICD-10-CM

## 2023-10-18 DIAGNOSIS — Z91.89 AT RISK FOR SLEEP APNEA: Primary | ICD-10-CM

## 2023-10-18 DIAGNOSIS — I10 HYPERTENSION, UNSPECIFIED TYPE: ICD-10-CM

## 2023-10-18 DIAGNOSIS — G47.26 CIRCADIAN RHYTHM SLEEP DISORDER, SHIFT WORK TYPE: ICD-10-CM

## 2023-10-18 PROCEDURE — G0463 HOSPITAL OUTPT CLINIC VISIT: HCPCS

## 2023-10-18 NOTE — PROGRESS NOTES
"Follow Up Sleep Disorders Center Note     Chief Complaint: At risk for sleep apnea    Primary Care Physician: Gi Phan, LESTER    Interval History:   The patient is a 48 y.o. male  who was last seen in the sleep lab:  1/18/2023; at that time had reported sleep study 15 years prior not prescribed Pap breathing machine.  Working night shift.  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  Home sleep study was ordered at that time.  This was canceled by the patient.  Presents today to discuss getting sleep study again for DOT clearance.    Review of Systems:    A complete review of systems was done and all were negative with the exception of all negative    Social History:    Social History     Socioeconomic History    Marital status:    Tobacco Use    Smoking status: Every Day     Types: Electronic Cigarette    Smokeless tobacco: Never   Substance and Sexual Activity    Alcohol use: Yes     Comment: casual    Drug use: No    Sexual activity: Defer       Allergies:  Penicillins     Medication Review:  Reviewed.      Vital Signs:    Vitals:    10/18/23 1300   Pulse: 81   SpO2: 96%   Weight: 127 kg (279 lb 6.4 oz)   Height: 172.7 cm (68\")     Body mass index is 42.48 kg/m².    Physical Exam:    Constitutional:  Well developed 48 y.o. male that appears in no apparent distress.  Awake & oriented times 3.  Normal mood with normal recent and remote memory and normal judgement.  Eyes:  Conjunctivae normal.  Oropharynx: Previously, moist mucous membranes.    Self-administered [place] Sleepiness Scale test results: 3  0-5 Lower normal daytime sleepiness  6-10 Higher normal daytime sleepiness  11-12 Mild, 13-15 Moderate, & 16-24 Severe excessive daytime sleepiness    Impression:   1. At risk for sleep apnea    2. Morbid obesity    3. Circadian rhythm sleep disorder, shift work type    4. Snoring    5. Hypertension, unspecified type      Order placed for home sleep study.    Plan:  Good sleep hygiene measures should be " maintained.  Weight loss would be beneficial in this patient who morbidly obese by Body mass index is 42.48 kg/m²..      I answered all of the patient's questions.        Edward Jimenez MD  Sleep Medicine  10/18/23  14:46 EDT

## 2023-10-27 ENCOUNTER — HOSPITAL ENCOUNTER (OUTPATIENT)
Dept: SLEEP MEDICINE | Facility: HOSPITAL | Age: 48
Discharge: HOME | End: 2023-10-27
Admitting: FAMILY MEDICINE
Payer: COMMERCIAL

## 2023-10-27 DIAGNOSIS — E66.01 MORBID OBESITY: ICD-10-CM

## 2023-10-27 DIAGNOSIS — G47.26 CIRCADIAN RHYTHM SLEEP DISORDER, SHIFT WORK TYPE: ICD-10-CM

## 2023-10-27 DIAGNOSIS — R06.83 SNORING: ICD-10-CM

## 2023-10-27 DIAGNOSIS — I10 HYPERTENSION, UNSPECIFIED TYPE: ICD-10-CM

## 2023-10-27 DIAGNOSIS — Z91.89 AT RISK FOR SLEEP APNEA: ICD-10-CM

## 2023-10-27 PROCEDURE — 95806 SLEEP STUDY UNATT&RESP EFFT: CPT

## 2023-11-06 ENCOUNTER — OFFICE VISIT (OUTPATIENT)
Dept: FAMILY MEDICINE CLINIC | Facility: CLINIC | Age: 48
End: 2023-11-06
Payer: COMMERCIAL

## 2023-11-06 VITALS
TEMPERATURE: 97.6 F | OXYGEN SATURATION: 96 % | DIASTOLIC BLOOD PRESSURE: 100 MMHG | WEIGHT: 284 LBS | HEIGHT: 68 IN | RESPIRATION RATE: 16 BRPM | SYSTOLIC BLOOD PRESSURE: 146 MMHG | HEART RATE: 72 BPM | BODY MASS INDEX: 43.04 KG/M2

## 2023-11-06 DIAGNOSIS — E03.9 HYPOTHYROIDISM, ACQUIRED: ICD-10-CM

## 2023-11-06 DIAGNOSIS — E55.9 VITAMIN D DEFICIENCY DISEASE: ICD-10-CM

## 2023-11-06 DIAGNOSIS — I10 PRIMARY HYPERTENSION: ICD-10-CM

## 2023-11-06 DIAGNOSIS — E78.2 MIXED HYPERLIPIDEMIA: Primary | ICD-10-CM

## 2023-11-06 DIAGNOSIS — E66.01 MORBIDLY OBESE: ICD-10-CM

## 2023-11-06 DIAGNOSIS — Z12.5 SCREENING PSA (PROSTATE SPECIFIC ANTIGEN): ICD-10-CM

## 2023-11-06 DIAGNOSIS — E53.8 LOW SERUM VITAMIN B12: ICD-10-CM

## 2023-11-06 DIAGNOSIS — M10.079 ACUTE IDIOPATHIC GOUT OF ANKLE, UNSPECIFIED LATERALITY: ICD-10-CM

## 2023-11-06 RX ORDER — LEVOTHYROXINE SODIUM 0.03 MG/1
TABLET ORAL
Qty: 90 TABLET | Refills: 3 | Status: SHIPPED | OUTPATIENT
Start: 2023-11-06

## 2023-11-06 RX ORDER — OMEGA-3-ACID ETHYL ESTERS 1 G/1
2 CAPSULE, LIQUID FILLED ORAL 2 TIMES DAILY
Qty: 360 CAPSULE | Refills: 3 | Status: SHIPPED | OUTPATIENT
Start: 2023-11-06

## 2023-11-06 RX ORDER — AMLODIPINE BESYLATE 10 MG/1
10 TABLET ORAL DAILY
Qty: 90 TABLET | Refills: 1 | Status: SHIPPED | OUTPATIENT
Start: 2023-11-06

## 2023-11-06 RX ORDER — NEBIVOLOL 5 MG/1
5 TABLET ORAL DAILY
Qty: 30 TABLET | Refills: 1 | Status: SHIPPED | OUTPATIENT
Start: 2023-11-06

## 2023-11-06 RX ORDER — VALSARTAN 320 MG/1
320 TABLET ORAL DAILY
Qty: 90 TABLET | Refills: 1 | Status: SHIPPED | OUTPATIENT
Start: 2023-11-06

## 2023-11-06 RX ORDER — LUMATEPERONE 42 MG/1
CAPSULE ORAL
COMMUNITY
Start: 2023-10-23

## 2023-11-06 NOTE — PROGRESS NOTES
"Subjective   Brandan Higuera is a 48 y.o. male.     History of Present Illness    Since the last visit, he has overall felt tired.  He has Primary Hypertension not at goal, plan to add/adjust medication, Hyperlipidemia and working on this with diet and exercise, Hypothyroidism and must update labs to continue treatment, and Vitamin D deficiency and will update labs for continued management.  he has been compliant with current medications have reviewed them.  The patient denies medication side effects.  Will refill medications. /100   Pulse 72   Temp 97.6 °F (36.4 °C)   Resp 16   Ht 172.7 cm (68\")   Wt 129 kg (284 lb)   SpO2 96%   BMI 43.18 kg/m²   --wants to wait on Wegovy  Results for orders placed or performed in visit on 10/20/22   Comprehensive metabolic panel    Specimen: Blood   Result Value Ref Range    Glucose 111 (H) 65 - 99 mg/dL    BUN 12 6 - 20 mg/dL    Creatinine 0.99 0.76 - 1.27 mg/dL    EGFR Result 95.7 >60.0 mL/min/1.73    BUN/Creatinine Ratio 12.1 7.0 - 25.0    Sodium 142 136 - 145 mmol/L    Potassium 4.0 3.5 - 5.2 mmol/L    Chloride 102 98 - 107 mmol/L    Total CO2 27.0 22.0 - 29.0 mmol/L    Calcium 10.3 8.6 - 10.5 mg/dL    Total Protein 7.1 6.0 - 8.5 g/dL    Albumin 5.10 3.50 - 5.20 g/dL    Globulin 2.0 gm/dL    A/G Ratio 2.6 g/dL    Total Bilirubin 0.2 0.0 - 1.2 mg/dL    Alkaline Phosphatase 82 39 - 117 U/L    AST (SGOT) 35 1 - 40 U/L    ALT (SGPT) 43 (H) 1 - 41 U/L   Lipid panel    Specimen: Blood   Result Value Ref Range    Total Cholesterol 194 0 - 200 mg/dL    Triglycerides 380 (H) 0 - 150 mg/dL    HDL Cholesterol 40 40 - 60 mg/dL    VLDL Cholesterol Michael 63 (H) 5 - 40 mg/dL    LDL Chol Calc (NIH) 91 0 - 100 mg/dL   TSH    Specimen: Blood   Result Value Ref Range    TSH 0.853 0.270 - 4.200 uIU/mL   Hemoglobin A1c    Specimen: Blood   Result Value Ref Range    Hemoglobin A1C 5.60 4.80 - 5.60 %   T4, Free    Specimen: Blood   Result Value Ref Range    Free T4 0.84 (L) " 0.93 - 1.70 ng/dL   T3, Free    Specimen: Blood   Result Value Ref Range    T3, Free 3.3 2.0 - 4.4 pg/mL   Vitamin B12    Specimen: Blood   Result Value Ref Range    Vitamin B-12 593 211 - 946 pg/mL   Folate    Specimen: Blood   Result Value Ref Range    Folate 19.40 4.78 - 24.20 ng/mL   Vitamin D,25-Hydroxy    Specimen: Blood   Result Value Ref Range    25 Hydroxy, Vitamin D 47.9 30.0 - 100.0 ng/ml   Uric Acid    Specimen: Blood   Result Value Ref Range    Uric Acid 7.5 (H) 3.4 - 7.0 mg/dL   Microscopic Examination -   Result Value Ref Range    WBC, UA 0-2 /HPF    RBC, UA 0-2 /HPF    Epithelial Cells (non renal) 0-2 /HPF    Cast Type Comment     Crystal Type Comment     Bacteria, UA Comment None Seen /HPF   CBC and Differential    Specimen: Blood   Result Value Ref Range    WBC 5.64 3.40 - 10.80 10*3/mm3    RBC 5.38 4.14 - 5.80 10*6/mm3    Hemoglobin 15.3 13.0 - 17.7 g/dL    Hematocrit 44.5 37.5 - 51.0 %    MCV 82.7 79.0 - 97.0 fL    MCH 28.4 26.6 - 33.0 pg    MCHC 34.4 31.5 - 35.7 g/dL    RDW 13.9 12.3 - 15.4 %    Platelets 176 140 - 450 10*3/mm3    Neutrophil Rel % 42.6 (L) 42.7 - 76.0 %    Lymphocyte Rel % 38.5 19.6 - 45.3 %    Monocyte Rel % 8.2 5.0 - 12.0 %    Eosinophil Rel % 9.4 (H) 0.3 - 6.2 %    Basophil Rel % 1.1 0.0 - 1.5 %    Neutrophils Absolute 2.41 1.70 - 7.00 10*3/mm3    Lymphocytes Absolute 2.17 0.70 - 3.10 10*3/mm3    Monocytes Absolute 0.46 0.10 - 0.90 10*3/mm3    Eosinophils Absolute 0.53 (H) 0.00 - 0.40 10*3/mm3    Basophils Absolute 0.06 0.00 - 0.20 10*3/mm3    Immature Granulocyte Rel % 0.2 0.0 - 0.5 %    Immature Grans Absolute 0.01 0.00 - 0.05 10*3/mm3    nRBC 0.0 0.0 - 0.2 /100 WBC   Urinalysis With Microscopic - Urine, Clean Catch    Specimen: Urine, Clean Catch   Result Value Ref Range    Specific Gravity, UA 1.024 1.005 - 1.030    pH, UA 5.5 5.0 - 8.0    Color, UA Yellow     Appearance, UA Clear Clear    Leukocytes, UA Negative Negative    Protein Trace (A) Negative    Glucose, UA  Negative Negative    Ketones Trace (A) Negative    Blood, UA Negative Negative    Bilirubin, UA Negative Negative    Urobilinogen, UA Comment     Nitrite, UA Negative Negative     Seeing Rheumatology for gout yearly---DR Malcolm, just on Allopurinol for suppression  Just saw DR Eric to cecilia obst sleep apnea  COVID ---saw Robinson=---video 4-11-23---resolved    Bp up at DOT  Normal EKG 10-20-22      Taking D and B12  Not taking Vasepa---? Toyin  Sees [facility]---on meds Bipolar    The following portions of the patient's history were reviewed and updated as appropriate: allergies, current medications, past family history, past medical history, past social history, past surgical history, and problem list.    Review of Systems    Objective   Physical Exam      Assessment & Plan   Diagnoses and all orders for this visit:    1. Mixed hyperlipidemia (Primary)  -     Comprehensive metabolic panel  -     Lipid panel  -     CBC and Differential  -     TSH  -     Hemoglobin A1c  -     T4, Free  -     T3, Free  -     Vitamin B12  -     Folate  -     Vitamin D,25-Hydroxy  -     Uric Acid    2. Vitamin D deficiency disease  -     Comprehensive metabolic panel  -     Lipid panel  -     CBC and Differential  -     TSH  -     Hemoglobin A1c  -     T4, Free  -     T3, Free  -     Vitamin B12  -     Folate  -     Vitamin D,25-Hydroxy  -     Uric Acid    3. Acute idiopathic gout of ankle, unspecified laterality  -     Comprehensive metabolic panel  -     Lipid panel  -     CBC and Differential  -     TSH  -     Hemoglobin A1c  -     T4, Free  -     T3, Free  -     Vitamin B12  -     Folate  -     Vitamin D,25-Hydroxy  -     Uric Acid    4. Low serum vitamin B12  -     Comprehensive metabolic panel  -     Lipid panel  -     CBC and Differential  -     TSH  -     Hemoglobin A1c  -     T4, Free  -     T3, Free  -     Vitamin B12  -     Folate  -     Vitamin D,25-Hydroxy  -     Uric Acid    5. Morbidly obese  -      Comprehensive metabolic panel  -     Lipid panel  -     CBC and Differential  -     TSH  -     Hemoglobin A1c  -     T4, Free  -     T3, Free  -     Vitamin B12  -     Folate  -     Vitamin D,25-Hydroxy  -     Uric Acid    6. Hypothyroidism, acquired  -     Comprehensive metabolic panel  -     Lipid panel  -     CBC and Differential  -     TSH  -     Hemoglobin A1c  -     T4, Free  -     T3, Free  -     Vitamin B12  -     Folate  -     Vitamin D,25-Hydroxy  -     Uric Acid    7. Primary hypertension  -     Comprehensive metabolic panel  -     Lipid panel  -     CBC and Differential  -     TSH  -     Hemoglobin A1c  -     T4, Free  -     T3, Free  -     Vitamin B12  -     Folate  -     Vitamin D,25-Hydroxy  -     Uric Acid    8. Screening PSA (prostate specific antigen)  -     Comprehensive metabolic panel  -     Lipid panel  -     CBC and Differential  -     TSH  -     Hemoglobin A1c  -     T4, Free  -     T3, Free  -     Vitamin B12  -     Folate  -     Vitamin D,25-Hydroxy  -     Uric Acid    Other orders  -     nebivolol (Bystolic) 5 MG tablet; Take 1 tablet by mouth Daily. For BP  Dispense: 30 tablet; Refill: 1  -     omega-3 acid ethyl esters (LOVAZA) 1 g capsule; Take 2 capsules by mouth 2 (Two) Times a Day. for triglycerides  Dispense: 360 capsule; Refill: 3  -     amLODIPine (NORVASC) 10 MG tablet; Take 1 tablet by mouth Daily. for blood pressure.  Dispense: 90 tablet; Refill: 1  -     levothyroxine (SYNTHROID, LEVOTHROID) 25 MCG tablet; One PO daily before breakfast for thyroid  Dispense: 90 tablet; Refill: 3  -     valsartan (DIOVAN) 320 MG tablet; Take 1 tablet by mouth Daily. For BP and stop taking Lisinopril  Dispense: 90 tablet; Refill: 1    Dad had prostate cancer--screen PSA  Plan, Brandan Higuera, was seen today.  he was seen for HTN and add/adjust medication, Hyperlipidemia and will work on this with diet and exercise, Hypothyroidism and will need to update labs for continued treatment, and  Vitamin D deficiency and will update labs .  Weight is up and no exercise  Just had sleep study----results pending  On B12 and D  Wants to wait on colon cancer----colonoscopy  Avoid diuretics---gout hx  Can ACE to [facility]       Answers submitted by the patient for this visit:  Primary Reason for Visit (Submitted on 11/6/2023)  What is the primary reason for your visit?: High Blood Pressure

## 2023-11-07 DIAGNOSIS — G47.33 SEVERE OBSTRUCTIVE SLEEP APNEA: Primary | ICD-10-CM

## 2023-11-07 DIAGNOSIS — E66.01 MORBID OBESITY: ICD-10-CM

## 2023-11-07 LAB
25(OH)D3+25(OH)D2 SERPL-MCNC: 34.9 NG/ML (ref 30–100)
ALBUMIN SERPL-MCNC: 5.2 G/DL (ref 3.5–5.2)
ALBUMIN/GLOB SERPL: 2.7 G/DL
ALP SERPL-CCNC: 76 U/L (ref 39–117)
ALT SERPL-CCNC: 43 U/L (ref 1–41)
AST SERPL-CCNC: 32 U/L (ref 1–40)
BASOPHILS # BLD AUTO: 0.06 10*3/MM3 (ref 0–0.2)
BASOPHILS NFR BLD AUTO: 0.9 % (ref 0–1.5)
BILIRUB SERPL-MCNC: 0.5 MG/DL (ref 0–1.2)
BUN SERPL-MCNC: 16 MG/DL (ref 6–20)
BUN/CREAT SERPL: 16 (ref 7–25)
CALCIUM SERPL-MCNC: 10 MG/DL (ref 8.6–10.5)
CHLORIDE SERPL-SCNC: 102 MMOL/L (ref 98–107)
CHOLEST SERPL-MCNC: 187 MG/DL (ref 0–200)
CO2 SERPL-SCNC: 26.8 MMOL/L (ref 22–29)
CREAT SERPL-MCNC: 1 MG/DL (ref 0.76–1.27)
EGFRCR SERPLBLD CKD-EPI 2021: 94 ML/MIN/1.73
EOSINOPHIL # BLD AUTO: 0.33 10*3/MM3 (ref 0–0.4)
EOSINOPHIL NFR BLD AUTO: 4.9 % (ref 0.3–6.2)
ERYTHROCYTE [DISTWIDTH] IN BLOOD BY AUTOMATED COUNT: 14.4 % (ref 12.3–15.4)
FOLATE SERPL-MCNC: >20 NG/ML (ref 4.78–24.2)
GLOBULIN SER CALC-MCNC: 1.9 GM/DL
GLUCOSE SERPL-MCNC: 116 MG/DL (ref 65–99)
HBA1C MFR BLD: 5.4 % (ref 4.8–5.6)
HCT VFR BLD AUTO: 45.8 % (ref 37.5–51)
HDLC SERPL-MCNC: 43 MG/DL (ref 40–60)
HGB BLD-MCNC: 15.2 G/DL (ref 13–17.7)
IMM GRANULOCYTES # BLD AUTO: 0.02 10*3/MM3 (ref 0–0.05)
IMM GRANULOCYTES NFR BLD AUTO: 0.3 % (ref 0–0.5)
LDLC SERPL CALC-MCNC: 103 MG/DL (ref 0–100)
LYMPHOCYTES # BLD AUTO: 2.52 10*3/MM3 (ref 0.7–3.1)
LYMPHOCYTES NFR BLD AUTO: 37.6 % (ref 19.6–45.3)
MCH RBC QN AUTO: 27.5 PG (ref 26.6–33)
MCHC RBC AUTO-ENTMCNC: 33.2 G/DL (ref 31.5–35.7)
MCV RBC AUTO: 83 FL (ref 79–97)
MONOCYTES # BLD AUTO: 0.44 10*3/MM3 (ref 0.1–0.9)
MONOCYTES NFR BLD AUTO: 6.6 % (ref 5–12)
NEUTROPHILS # BLD AUTO: 3.33 10*3/MM3 (ref 1.7–7)
NEUTROPHILS NFR BLD AUTO: 49.7 % (ref 42.7–76)
NRBC BLD AUTO-RTO: 0.1 /100 WBC (ref 0–0.2)
PLATELET # BLD AUTO: 174 10*3/MM3 (ref 140–450)
POTASSIUM SERPL-SCNC: 4.3 MMOL/L (ref 3.5–5.2)
PROT SERPL-MCNC: 7.1 G/DL (ref 6–8.5)
RBC # BLD AUTO: 5.52 10*6/MM3 (ref 4.14–5.8)
SODIUM SERPL-SCNC: 140 MMOL/L (ref 136–145)
T3FREE SERPL-MCNC: 3.7 PG/ML (ref 2–4.4)
T4 FREE SERPL-MCNC: 1.11 NG/DL (ref 0.93–1.7)
TRIGL SERPL-MCNC: 241 MG/DL (ref 0–150)
TSH SERPL DL<=0.005 MIU/L-ACNC: 1.41 UIU/ML (ref 0.27–4.2)
URATE SERPL-MCNC: 8.3 MG/DL (ref 3.4–7)
VIT B12 SERPL-MCNC: 453 PG/ML (ref 211–946)
VLDLC SERPL CALC-MCNC: 41 MG/DL (ref 5–40)
WBC # BLD AUTO: 6.7 10*3/MM3 (ref 3.4–10.8)

## 2023-11-08 ENCOUNTER — TELEPHONE (OUTPATIENT)
Dept: SLEEP MEDICINE | Facility: HOSPITAL | Age: 48
End: 2023-11-08
Payer: COMMERCIAL

## 2023-11-08 NOTE — TELEPHONE ENCOUNTER
LV for patient to call if she has questions about sleep study results , a preferred DME , or to schedule follow up . [facility] unless [facility] requests otherwise

## 2023-11-29 ENCOUNTER — OFFICE VISIT (OUTPATIENT)
Dept: FAMILY MEDICINE CLINIC | Facility: CLINIC | Age: 48
End: 2023-11-29
Payer: COMMERCIAL

## 2023-11-29 VITALS
WEIGHT: 292 LBS | TEMPERATURE: 97.6 F | RESPIRATION RATE: 16 BRPM | OXYGEN SATURATION: 96 % | HEIGHT: 68 IN | DIASTOLIC BLOOD PRESSURE: 82 MMHG | SYSTOLIC BLOOD PRESSURE: 120 MMHG | HEART RATE: 76 BPM | BODY MASS INDEX: 44.25 KG/M2

## 2023-11-29 DIAGNOSIS — R79.89 LFT ELEVATION: ICD-10-CM

## 2023-11-29 DIAGNOSIS — E66.01 MORBIDLY OBESE: ICD-10-CM

## 2023-11-29 DIAGNOSIS — R73.9 HYPERGLYCEMIA: ICD-10-CM

## 2023-11-29 DIAGNOSIS — E78.2 MIXED HYPERLIPIDEMIA: Chronic | ICD-10-CM

## 2023-11-29 DIAGNOSIS — I10 HYPERTENSION, UNSPECIFIED TYPE: Primary | Chronic | ICD-10-CM

## 2023-11-29 DIAGNOSIS — G47.33 OBSTRUCTIVE SLEEP APNEA: Chronic | ICD-10-CM

## 2023-11-29 DIAGNOSIS — M10.079 ACUTE IDIOPATHIC GOUT OF ANKLE, UNSPECIFIED LATERALITY: Chronic | ICD-10-CM

## 2023-11-29 DIAGNOSIS — E03.9 HYPOTHYROIDISM, ACQUIRED: Chronic | ICD-10-CM

## 2023-11-29 RX ORDER — NEBIVOLOL 5 MG/1
5 TABLET ORAL DAILY
Qty: 90 TABLET | Refills: 1 | Status: SHIPPED | OUTPATIENT
Start: 2023-11-29

## 2023-11-29 RX ORDER — SEMAGLUTIDE 0.25 MG/.5ML
0.25 INJECTION, SOLUTION SUBCUTANEOUS WEEKLY
Qty: 2 ML | Refills: 0 | Status: SHIPPED | OUTPATIENT
Start: 2023-11-29

## 2023-11-29 NOTE — PROGRESS NOTES
"Subjective   Brandan Higuera is a 48 y.o. male.     History of Present Illness    Since the last visit, he has overall felt fairly well.  He has Primary Hypertension and well controlled on current medication, Impaired fasting glucose and will monitor labs to watch for DMII, Hyperlipidemia with goals met with current Rx, Hypothyroidism well controlled on current medication and will continue Rx, and Vitamin D deficiency and labs are at goal >30 ng/mL.  he has been compliant with current medications have reviewed them.  Following up today from 11/6/2023 visit due to uncontrolled hypertension and a the patient denies medication side effects.  I added by systolic to treat his uncontrolled primary hypertension and were following up today on blood pressure reading... I also changed his ACE to an ARB to get his blood pressure down.  Lisinopril to valsartan..  /86   Pulse 76   Temp 97.6 °F (36.4 °C)   Resp 16   Ht 172.7 cm (68\")   Wt 132 kg (292 lb)   SpO2 96%   BMI 44.40 kg/m²   Now on Cpap    Wants to wait on colon cancer screening  Also avoiding diuretics due to the gout history  Results for orders placed or performed in visit on 11/06/23   Comprehensive metabolic panel    Specimen: Blood   Result Value Ref Range    Glucose 116 (H) 65 - 99 mg/dL    BUN 16 6 - 20 mg/dL    Creatinine 1.00 0.76 - 1.27 mg/dL    EGFR Result 94.0 >60.0 mL/min/1.73    BUN/Creatinine Ratio 16.0 7.0 - 25.0    Sodium 140 136 - 145 mmol/L    Potassium 4.3 3.5 - 5.2 mmol/L    Chloride 102 98 - 107 mmol/L    Total CO2 26.8 22.0 - 29.0 mmol/L    Calcium 10.0 8.6 - 10.5 mg/dL    Total Protein 7.1 6.0 - 8.5 g/dL    Albumin 5.2 3.5 - 5.2 g/dL    Globulin 1.9 gm/dL    A/G Ratio 2.7 g/dL    Total Bilirubin 0.5 0.0 - 1.2 mg/dL    Alkaline Phosphatase 76 39 - 117 U/L    AST (SGOT) 32 1 - 40 U/L    ALT (SGPT) 43 (H) 1 - 41 U/L   Lipid panel    Specimen: Blood   Result Value Ref Range    Total Cholesterol 187 0 - 200 mg/dL    Triglycerides 241 (H) " 0 - 150 mg/dL    HDL Cholesterol 43 40 - 60 mg/dL    VLDL Cholesterol Michael 41 (H) 5 - 40 mg/dL    LDL Chol Calc (NIH) 103 (H) 0 - 100 mg/dL   TSH    Specimen: Blood   Result Value Ref Range    TSH 1.410 0.270 - 4.200 uIU/mL   Hemoglobin A1c    Specimen: Blood   Result Value Ref Range    Hemoglobin A1C 5.40 4.80 - 5.60 %   T4, Free    Specimen: Blood   Result Value Ref Range    Free T4 1.11 0.93 - 1.70 ng/dL   T3, Free    Specimen: Blood   Result Value Ref Range    T3, Free 3.7 2.0 - 4.4 pg/mL   Vitamin B12    Specimen: Blood   Result Value Ref Range    Vitamin B-12 453 211 - 946 pg/mL   Folate    Specimen: Blood   Result Value Ref Range    Folate >20.00 4.78 - 24.20 ng/mL   Vitamin D,25-Hydroxy    Specimen: Blood   Result Value Ref Range    25 Hydroxy, Vitamin D 34.9 30.0 - 100.0 ng/ml   Uric Acid    Specimen: Blood   Result Value Ref Range    Uric Acid 8.3 (H) 3.4 - 7.0 mg/dL   CBC and Differential    Specimen: Blood   Result Value Ref Range    WBC 6.70 3.40 - 10.80 10*3/mm3    RBC 5.52 4.14 - 5.80 10*6/mm3    Hemoglobin 15.2 13.0 - 17.7 g/dL    Hematocrit 45.8 37.5 - 51.0 %    MCV 83.0 79.0 - 97.0 fL    MCH 27.5 26.6 - 33.0 pg    MCHC 33.2 31.5 - 35.7 g/dL    RDW 14.4 12.3 - 15.4 %    Platelets 174 140 - 450 10*3/mm3    Neutrophil Rel % 49.7 42.7 - 76.0 %    Lymphocyte Rel % 37.6 19.6 - 45.3 %    Monocyte Rel % 6.6 5.0 - 12.0 %    Eosinophil Rel % 4.9 0.3 - 6.2 %    Basophil Rel % 0.9 0.0 - 1.5 %    Neutrophils Absolute 3.33 1.70 - 7.00 10*3/mm3    Lymphocytes Absolute 2.52 0.70 - 3.10 10*3/mm3    Monocytes Absolute 0.44 0.10 - 0.90 10*3/mm3    Eosinophils Absolute 0.33 0.00 - 0.40 10*3/mm3    Basophils Absolute 0.06 0.00 - 0.20 10*3/mm3    Immature Granulocyte Rel % 0.3 0.0 - 0.5 %    Immature Grans Absolute 0.02 0.00 - 0.05 10*3/mm3    nRBC 0.1 0.0 - 0.2 /100 WBC     10/20/2022./7/2023  6:09 PM EST       Mild elevation of your glucose but your hemoglobin A1c, average glucose for 2 to 3 months is normal.  We will  continue to watch this.  Triglycerides are mildly improved and your cholesterol rescore is less than 7.5%.  Thyroid labs are in normal range.  Uric acid level is high and make sure you are taking your allopurinol?  Your liver enzyme remains mildly elevated and we will discuss further work-up at your next appointment  The 10-year ASCVD risk score (Lina FIGUEROA, et al., 2019) is: 3.6%   Seeing Rheumatology for gout yearly---DR Malcolm, just on Allopurinol for suppression  Just saw DR Jimenez to eval obst sleep apnea----he is on Cpap  normal EKG on 10/20/2022  Sees [facility]---on meds Bipolar     No family history of thyroid cancer or M EN..  Patient has never had pancreatitis or gastroparesis  Long discussion today about obesity and how that is impacting his health with increasing his blood pressure for uncontrolled hypertension and were following up today on that also noting liver enzyme ALT is elevated and I am concerned about fatty liver and will obtain liver ultrasound and patient now is on a CPAP and diagnosed with obstructive sleep apnea... Triglycerides remain elevated and weight loss would help that also.  Glucose was elevated but A1c was in range mom has diabetes still concerned about his risk for diabetes CV does not lose weight.  The following portions of the patient's history were reviewed and updated as appropriate: allergies, current medications, past family history, past medical history, past social history, past surgical history, and problem list.    Review of Systems   Eyes:  Negative for blurred vision.   Respiratory:  Negative for shortness of breath.    Cardiovascular:  Negative for chest pain and palpitations.       Objective   Physical Exam  Vitals and nursing note reviewed.   Constitutional:       General: He is not in acute distress.     Appearance: He is well-developed. He is obese. He is not diaphoretic.   HENT:      Head: Normocephalic.   Eyes:      General: No scleral icterus.      Conjunctiva/sclera: Conjunctivae normal.      Pupils: Pupils are equal, round, and reactive to light.   Cardiovascular:      Rate and Rhythm: Normal rate and regular rhythm.      Heart sounds: Normal heart sounds. No murmur heard.  Pulmonary:      Effort: Pulmonary effort is normal.      Breath sounds: Normal breath sounds.   Musculoskeletal:         General: Normal range of motion.      Cervical back: Normal range of motion and neck supple.      Right lower leg: No edema.      Left lower leg: No edema.   Skin:     General: Skin is warm and dry.      Findings: No rash.   Neurological:      Mental Status: He is alert and oriented to person, place, and time.   Psychiatric:         Mood and Affect: Mood normal. Affect is not inappropriate.         Behavior: Behavior normal.         Thought Content: Thought content normal.         Judgment: Judgment normal.           Assessment & Plan   Diagnoses and all orders for this visit:    1. Hypertension, unspecified type (Primary)    2. Mixed hyperlipidemia    3. Obstructive sleep apnea    4. Morbidly obese    5. Acute idiopathic gout of ankle, unspecified laterality    6. LFT elevation  -     US Liver    7. Hypothyroidism, acquired    Other orders  -     Semaglutide-Weight Management (Wegovy) 0.25 MG/0.5ML solution auto-injector; Inject 0.25 mg under the skin into the appropriate area as directed 1 (One) Time Per Week. Inject 0.25 mg SQ weekly x4  Dispense: 2 mL; Refill: 0        Plan, Brandan Higuera, was seen today.  he was seen for HTN and continue medication, Imparied fasting glucose and plan follow up labs, diet, and exercise, Hyperlipidemia and will continue current medication, Hypothyroidism well controlled, continue medication, and Vitamin D deficiency and supplemented.  Get liver US-----concern for fatty liver and prediabetes----mom DMII---glucose impaired----watching for DMII---get weight down---now has obst sleep apnea----get weight down.  HTN and not  controlled----added Bystolic and changed ACE to ARB---get weight down  Talked about Wegovy--must have small frequent meals due to the gastric emptying slowing down with medication           Answers submitted by the patient for this visit:  Primary Reason for Visit (Submitted on 11/27/2023)  What is the primary reason for your visit?: High Blood Pressure

## 2024-01-29 ENCOUNTER — TELEPHONE (OUTPATIENT)
Dept: SLEEP MEDICINE | Facility: HOSPITAL | Age: 49
End: 2024-01-29
Payer: COMMERCIAL

## 2024-01-31 ENCOUNTER — OFFICE VISIT (OUTPATIENT)
Dept: SLEEP MEDICINE | Facility: HOSPITAL | Age: 49
End: 2024-01-31
Payer: COMMERCIAL

## 2024-01-31 VITALS — HEIGHT: 68 IN | HEART RATE: 73 BPM | WEIGHT: 300.8 LBS | BODY MASS INDEX: 45.59 KG/M2 | OXYGEN SATURATION: 98 %

## 2024-01-31 DIAGNOSIS — Z02.4 ENCOUNTER FOR CDL (COMMERCIAL DRIVING LICENSE) EXAM: ICD-10-CM

## 2024-01-31 DIAGNOSIS — E66.01 MORBID OBESITY: ICD-10-CM

## 2024-01-31 DIAGNOSIS — G47.33 SEVERE OBSTRUCTIVE SLEEP APNEA: Primary | ICD-10-CM

## 2024-01-31 PROCEDURE — G0463 HOSPITAL OUTPT CLINIC VISIT: HCPCS

## 2024-01-31 NOTE — PROGRESS NOTES
"Follow Up [facility] Note     Chief Complaint:  MIKAL     Primary Care Physician: Gi Phan, LESTER    Interval History:   The patient is a 48 y.o. male  who was last seen in the sleep lab: 10/27/2023 for home sleep study which showed AHI 28.9 lowest SpO2 77% per advised auto CPAP 8-20 cm H2O.  Today reports improvement since last visit.  Bedtime 11 AM wake time 6 PM.  Fullface mask fits well leaks air some dry mouth.    Downloaded PAP Data Reviewed For Compliance:  DME is [facility].  Downloads between 12/30/2023 - 1/28/2024.  Average usage is 5 hours 39 minutes.  Average AHI is 1.6.  Average auto CPAP pressure is 12.1 cm H2O    I have reviewed the above results and compared them with the patient's last downloads and reviewed with the patient.    Review of Systems:    A complete review of systems was done and all were negative with the exception of the congestion    Social History:    Social History     Socioeconomic History    Marital status:    Tobacco Use    Smoking status: Former     Types: Cigarettes    Smokeless tobacco: Never   Vaping Use    Vaping Use: Former   Substance and Sexual Activity    Alcohol use: Yes     Comment: casual    Drug use: No    Sexual activity: Defer       Allergies:  Penicillins     Medication Review:  Reviewed.      Vital Signs:    Vitals:    01/31/24 0900   Pulse: 73   SpO2: 98%   Weight: (!) 136 kg (300 lb 12.8 oz)   Height: 172.7 cm (68\")     Body mass index is 45.74 kg/m².    Physical Exam:    Constitutional:  Well developed 48 y.o. male that appears in no apparent distress.  Awake & oriented times 3.  Normal mood with normal recent and remote memory and normal judgement.  Eyes:  Conjunctivae normal.  Oropharynx: Previously, moist mucous membranes.    Self-administered [place] Sleepiness Scale test results: 3  0-5 Lower normal daytime sleepiness  6-10 Higher normal daytime sleepiness  11-12 Mild, 13-15 Moderate, & 16-24 Severe excessive daytime " sleepiness    Impression:   1. Severe obstructive sleep apnea    2. 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 The patient appears to be at goal with good compliance and usage. The patient has no complaints of hypersomnolence.    Plan:  Good sleep hygiene measures should be maintained.  Weight loss would be beneficial in this patient who is morbidly obese by Body mass index is 45.74 kg/m²..      After evaluating the patient and assessing results available, the patient is benefiting from the treatment being provided.     The patient will continue auto CPAP.  After clinical evaluation and review of downloads, I recommend no changes to the patient's pressures.  A new prescription will be sent to the patient's DME.    Caution during activities that require prolonged concentration is strongly advised if sleepiness returns. Changing of PAP supplies regularly is important for effective use. Patient needs to change cushion on the mask or plugs on nasal pillows along with disposable filters once every month and change mask frame, tubing, headgear and Velcro straps every 6 months at the minimum.    CDL ('s license) clearance. Patient has a history of sleep apnea which is being treated with APAP 8-20.  I have seen the patient today and had a face-to-face conversation.  I have also reviewed the smartcard download from the device which shows good compliance.  Patient's symptoms have resolved with the use of the device.  Patient does not have any excessive daytime sleepiness and is also getting adequate sleep as per the download.  The residual AHI is acceptable.  I have encourage the patient to continue to use the device as it is benefiting the patient in treating sleep apnea and also controlling symptoms.  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     I answered all of the patient's questions.  The patient will call for any problems and will follow up in 6 months.      Edward Jimenez MD  Sleep Medicine  01/31/24  09:10 EST

## 2024-02-09 RX ORDER — ROSUVASTATIN CALCIUM 10 MG/1
10 TABLET, COATED ORAL DAILY
Qty: 90 TABLET | Refills: 3 | Status: SHIPPED | OUTPATIENT
Start: 2024-02-09

## 2024-03-14 RX ORDER — SEMAGLUTIDE 0.25 MG/.5ML
0.25 INJECTION, SOLUTION SUBCUTANEOUS WEEKLY
Qty: 2 ML | Refills: 0 | Status: SHIPPED | OUTPATIENT
Start: 2024-03-14

## 2024-04-11 RX ORDER — SEMAGLUTIDE 0.25 MG/.5ML
0.25 INJECTION, SOLUTION SUBCUTANEOUS WEEKLY
Qty: 2 ML | Refills: 0 | Status: SHIPPED | OUTPATIENT
Start: 2024-04-11

## 2024-05-03 RX ORDER — SEMAGLUTIDE 0.5 MG/.5ML
0.5 INJECTION, SOLUTION SUBCUTANEOUS WEEKLY
Qty: 2 ML | Refills: 2 | Status: SHIPPED | OUTPATIENT
Start: 2024-05-03

## 2024-05-05 RX ORDER — VALSARTAN 320 MG/1
320 TABLET ORAL DAILY
Qty: 30 TABLET | Refills: 0 | Status: SHIPPED | OUTPATIENT
Start: 2024-05-05

## 2024-05-05 RX ORDER — AMLODIPINE BESYLATE 10 MG/1
10 TABLET ORAL DAILY
Qty: 30 TABLET | Refills: 0 | Status: SHIPPED | OUTPATIENT
Start: 2024-05-05

## 2024-06-05 RX ORDER — NEBIVOLOL 5 MG/1
5 TABLET ORAL DAILY
Qty: 30 TABLET | Refills: 0 | Status: SHIPPED | OUTPATIENT
Start: 2024-06-05

## 2024-06-22 RX ORDER — AMLODIPINE BESYLATE 10 MG/1
TABLET ORAL
Qty: 30 TABLET | Refills: 0 | OUTPATIENT
Start: 2024-06-22

## 2024-06-22 RX ORDER — NEBIVOLOL 5 MG/1
5 TABLET ORAL DAILY
Qty: 30 TABLET | Refills: 0 | OUTPATIENT
Start: 2024-06-22

## 2024-07-02 RX ORDER — VALSARTAN 320 MG/1
TABLET ORAL
Qty: 30 TABLET | Refills: 0 | Status: SHIPPED | OUTPATIENT
Start: 2024-07-02

## 2024-07-08 ENCOUNTER — PATIENT MESSAGE (OUTPATIENT)
Dept: FAMILY MEDICINE CLINIC | Facility: CLINIC | Age: 49
End: 2024-07-08
Payer: COMMERCIAL

## 2024-07-08 RX ORDER — NEBIVOLOL 5 MG/1
5 TABLET ORAL DAILY
Qty: 30 TABLET | Refills: 0 | Status: SHIPPED | OUTPATIENT
Start: 2024-07-08

## 2024-07-08 RX ORDER — AMLODIPINE BESYLATE 10 MG/1
10 TABLET ORAL DAILY
Qty: 30 TABLET | Refills: 0 | Status: SHIPPED | OUTPATIENT
Start: 2024-07-08

## 2024-07-08 NOTE — TELEPHONE ENCOUNTER
From: Brandan Higuera  To: Gi Phan  Sent: 7/8/2024 10:47 AM EDT  Subject: Temporary refill for blood pressure.    I have a appointment with you on the 24th, however I will be short 14 days on the Nebivolol and Amlopine before I see you.   Is it possible to get a 14 day supply? Thanks again for your time.

## 2024-07-23 NOTE — PROGRESS NOTES
"Subjective   Brandan Higuera is a 49 y.o. male.     History of Present Illness      Since the last visit, he has overall felt fairly well.  He has Primary Hypertension and well controlled on current medication, Impaired fasting glucose and will monitor labs to watch for DMII, Hyperlipidemia with goals met with current Rx, Hypothyroidism and must update labs to continue treatment, and Vitamin D deficiency and will update labs for continued management.  he has been compliant with current medications have reviewed them.  The patient denies medication side effects.  Will refill medications. /84   Pulse 66   Temp 97.2 °F (36.2 °C)   Resp 16   Ht 172.7 cm (68\")   Wt 130 kg (287 lb)   SpO2 97%   BMI 43.64 kg/m² .      Last saw me on 11/29/2023 following up on his hypertension, mixed hyperlipidemia, impaired fasting glucose, vitamin D deficiency    Results for orders placed or performed in visit on 11/06/23   Comprehensive metabolic panel    Specimen: Blood   Result Value Ref Range    Glucose 116 (H) 65 - 99 mg/dL    BUN 16 6 - 20 mg/dL    Creatinine 1.00 0.76 - 1.27 mg/dL    EGFR Result 94.0 >60.0 mL/min/1.73    BUN/Creatinine Ratio 16.0 7.0 - 25.0    Sodium 140 136 - 145 mmol/L    Potassium 4.3 3.5 - 5.2 mmol/L    Chloride 102 98 - 107 mmol/L    Total CO2 26.8 22.0 - 29.0 mmol/L    Calcium 10.0 8.6 - 10.5 mg/dL    Total Protein 7.1 6.0 - 8.5 g/dL    Albumin 5.2 3.5 - 5.2 g/dL    Globulin 1.9 gm/dL    A/G Ratio 2.7 g/dL    Total Bilirubin 0.5 0.0 - 1.2 mg/dL    Alkaline Phosphatase 76 39 - 117 U/L    AST (SGOT) 32 1 - 40 U/L    ALT (SGPT) 43 (H) 1 - 41 U/L   Lipid panel    Specimen: Blood   Result Value Ref Range    Total Cholesterol 187 0 - 200 mg/dL    Triglycerides 241 (H) 0 - 150 mg/dL    HDL Cholesterol 43 40 - 60 mg/dL    VLDL Cholesterol Michael 41 (H) 5 - 40 mg/dL    LDL Chol Calc (NIH) 103 (H) 0 - 100 mg/dL   TSH    Specimen: Blood   Result Value Ref Range    TSH 1.410 0.270 - 4.200 uIU/mL   Hemoglobin " A1c    Specimen: Blood   Result Value Ref Range    Hemoglobin A1C 5.40 4.80 - 5.60 %   T4, Free    Specimen: Blood   Result Value Ref Range    Free T4 1.11 0.93 - 1.70 ng/dL   T3, Free    Specimen: Blood   Result Value Ref Range    T3, Free 3.7 2.0 - 4.4 pg/mL   Vitamin B12    Specimen: Blood   Result Value Ref Range    Vitamin B-12 453 211 - 946 pg/mL   Folate    Specimen: Blood   Result Value Ref Range    Folate >20.00 4.78 - 24.20 ng/mL   Vitamin D,25-Hydroxy    Specimen: Blood   Result Value Ref Range    25 Hydroxy, Vitamin D 34.9 30.0 - 100.0 ng/ml   Uric Acid    Specimen: Blood   Result Value Ref Range    Uric Acid 8.3 (H) 3.4 - 7.0 mg/dL   CBC and Differential    Specimen: Blood   Result Value Ref Range    WBC 6.70 3.40 - 10.80 10*3/mm3    RBC 5.52 4.14 - 5.80 10*6/mm3    Hemoglobin 15.2 13.0 - 17.7 g/dL    Hematocrit 45.8 37.5 - 51.0 %    MCV 83.0 79.0 - 97.0 fL    MCH 27.5 26.6 - 33.0 pg    MCHC 33.2 31.5 - 35.7 g/dL    RDW 14.4 12.3 - 15.4 %    Platelets 174 140 - 450 10*3/mm3    Neutrophil Rel % 49.7 42.7 - 76.0 %    Lymphocyte Rel % 37.6 19.6 - 45.3 %    Monocyte Rel % 6.6 5.0 - 12.0 %    Eosinophil Rel % 4.9 0.3 - 6.2 %    Basophil Rel % 0.9 0.0 - 1.5 %    Neutrophils Absolute 3.33 1.70 - 7.00 10*3/mm3    Lymphocytes Absolute 2.52 0.70 - 3.10 10*3/mm3    Monocytes Absolute 0.44 0.10 - 0.90 10*3/mm3    Eosinophils Absolute 0.33 0.00 - 0.40 10*3/mm3    Basophils Absolute 0.06 0.00 - 0.20 10*3/mm3    Immature Granulocyte Rel % 0.3 0.0 - 0.5 %    Immature Grans Absolute 0.02 0.00 - 0.05 10*3/mm3    nRBC 0.1 0.0 - 0.2 /100 WBC   Mild elevation of your glucose but your hemoglobin A1c, average glucose for 2 to 3 months is normal.  We will continue to watch this.  Triglycerides are mildly improved and your cholesterol rescore is less than 7.5%.  Thyroid labs are in normal range.  Uric acid level is high and make sure you are taking your allopurinol?  Your liver enzyme remains mildly elevated and we will discuss  further work-up at your next appointment  The 10-year ASCVD risk score (Lina FIGUEROA, et al., 2019) is: 3.6%  Patient had follow-up Dr. Jimenez for severe obstructive sleep apnea on 1/31/2024 and checked his smart card and downloaded machine data.  Continue CPAP--works---sleeps longer hours now.  Was seen for muscle strain and shoulder and neck 1/22/2024 at [facility].  And was treated with baclofen and meloxicam.    Seeing rheumatologist Dr. Malcolm for his management of gout and remains on allopurinol for suppression.  Sees [facility] for bipolar disorder and remains on medication management.  Started him on semaglutide for management of his obesity after this last visit 11/29/2023--- I was also concerned about his fatty liver and weight loss would help treat this-------down 13 lbs.  He needs to work out to preserve muscle tone with taking semaglutide.  Also need to get weight down and will increase dose semaglutide---tolerates well.     Aunt had colon cancer-----  The following portions of the patient's history were reviewed and updated as appropriate: allergies, current medications, past family history, past medical history, past social history, past surgical history, and problem list.    Review of Systems   Eyes:  Negative for blurred vision.   Respiratory:  Negative for shortness of breath.    Cardiovascular:  Negative for chest pain and palpitations.       Objective   Physical Exam  Vitals and nursing note reviewed.   Constitutional:       General: He is not in acute distress.     Appearance: He is well-developed. He is obese. He is not diaphoretic.   HENT:      Head: Normocephalic.      Right Ear: External ear normal.      Left Ear: External ear normal.   Eyes:      Conjunctiva/sclera: Conjunctivae normal.      Pupils: Pupils are equal, round, and reactive to light.   Neck:      Vascular: No carotid bruit.   Cardiovascular:      Rate and Rhythm: Normal rate and regular rhythm.      Heart sounds:  Normal heart sounds. No murmur heard.  Pulmonary:      Effort: Pulmonary effort is normal.      Breath sounds: Normal breath sounds. No rales.   Musculoskeletal:         General: Normal range of motion.      Cervical back: Normal range of motion and neck supple.      Right lower leg: No edema.      Left lower leg: No edema.   Skin:     General: Skin is warm and dry.      Findings: No rash.   Neurological:      General: No focal deficit present.      Mental Status: He is alert and oriented to person, place, and time.   Psychiatric:         Mood and Affect: Affect is not inappropriate.         Behavior: Behavior normal.         Thought Content: Thought content normal.         Judgment: Judgment normal.           Assessment & Plan   Diagnoses and all orders for this visit:    1. Primary hypertension (Primary)  -     Comprehensive metabolic panel  -     Lipid panel  -     CBC and Differential  -     TSH  -     Hemoglobin A1c  -     T4, Free  -     Vitamin D,25-Hydroxy  -     Vitamin B12  -     Folate  -     Urinalysis With Microscopic - Urine, Clean Catch  -     T3, Free    2. Obstructive sleep apnea  -     Comprehensive metabolic panel  -     Lipid panel  -     CBC and Differential  -     TSH  -     Hemoglobin A1c  -     T4, Free  -     Vitamin D,25-Hydroxy  -     Vitamin B12  -     Folate  -     Urinalysis With Microscopic - Urine, Clean Catch  -     T3, Free    3. Mixed hyperlipidemia  -     Comprehensive metabolic panel  -     Lipid panel  -     CBC and Differential  -     TSH  -     Hemoglobin A1c  -     T4, Free  -     Vitamin D,25-Hydroxy  -     Vitamin B12  -     Folate  -     Urinalysis With Microscopic - Urine, Clean Catch  -     T3, Free    4. Low serum vitamin B12  -     Comprehensive metabolic panel  -     Lipid panel  -     CBC and Differential  -     TSH  -     Hemoglobin A1c  -     T4, Free  -     Vitamin D,25-Hydroxy  -     Vitamin B12  -     Folate  -     Urinalysis With Microscopic - Urine, Clean  Catch  -     T3, Free    5. Hypothyroidism, acquired  -     Comprehensive metabolic panel  -     Lipid panel  -     CBC and Differential  -     TSH  -     Hemoglobin A1c  -     T4, Free  -     Vitamin D,25-Hydroxy  -     Vitamin B12  -     Folate  -     Urinalysis With Microscopic - Urine, Clean Catch  -     T3, Free    6. Vitamin D deficiency disease  -     Comprehensive metabolic panel  -     Lipid panel  -     CBC and Differential  -     TSH  -     Hemoglobin A1c  -     T4, Free  -     Vitamin D,25-Hydroxy  -     Vitamin B12  -     Folate  -     Urinalysis With Microscopic - Urine, Clean Catch  -     T3, Free    7. Acute idiopathic gout of ankle, unspecified laterality  -     Comprehensive metabolic panel  -     Lipid panel  -     CBC and Differential  -     TSH  -     Hemoglobin A1c  -     T4, Free  -     Vitamin D,25-Hydroxy  -     Vitamin B12  -     Folate  -     Urinalysis With Microscopic - Urine, Clean Catch  -     T3, Free    8. LFT elevation  -     Comprehensive metabolic panel  -     Lipid panel  -     CBC and Differential  -     TSH  -     Hemoglobin A1c  -     T4, Free  -     Vitamin D,25-Hydroxy  -     Vitamin B12  -     Folate  -     Urinalysis With Microscopic - Urine, Clean Catch  -     T3, Free    9. Bipolar 2 disorder  Comments:  Under care of of psychiatry    Other orders  -     Semaglutide-Weight Management (Wegovy) 1 MG/0.5ML solution auto-injector; Inject 0.5 mL under the skin into the appropriate area as directed 1 (One) Time Per Week. 1mg SQ once weekly for obesity  Dispense: 2 mL; Refill: 5  -     predniSONE (DELTASONE) 20 MG tablet; Take 1 tablet by mouth 2 (Two) Times a Day. With food for 5 days for gout flare  Dispense: 10 tablet; Refill: 2  -     amLODIPine (NORVASC) 10 MG tablet; Take 1 tablet by mouth Daily. for blood pressure.  Dispense: 90 tablet; Refill: 3      Advised colon cancer screening  Use Cpap/Bipap every night  Sees rheumatology Dr. Malcolm for management of his gout and  saw him last month for labs  Plan, Brandan Higuera, was seen today.  he was seen for HTN and continue medication, Imparied fasting glucose and plan follow up labs, diet, and exercise, Hyperlipidemia and will continue current medication, Hypothyroidism and will need to update labs for continued treatment, and Vitamin D deficiency and will update labs .  I still want to follow-up on that elevated ALT liver enzyme we will increase his semaglutide and plan to update all labs in November  Patient is aware he is due for colon cancer screening and plans colonoscopy next year  Liver ultrasound has not been completed will update labs and reorder if ALT remains high  Watch for low blood pressure as weight comes off   Sees psychiatry for treatment of bipolar disorder  Consider calcium cardiac CT score to screen for heart disease  Update labs  Avoiding HCTZ due to history of gout    Answers submitted by the patient for this visit:  Primary Reason for Visit (Submitted on 7/24/2024)  What is the primary reason for your visit?: High Blood Pressure

## 2024-07-24 ENCOUNTER — OFFICE VISIT (OUTPATIENT)
Dept: FAMILY MEDICINE CLINIC | Facility: CLINIC | Age: 49
End: 2024-07-24
Payer: COMMERCIAL

## 2024-07-24 VITALS
SYSTOLIC BLOOD PRESSURE: 126 MMHG | HEIGHT: 68 IN | OXYGEN SATURATION: 97 % | HEART RATE: 66 BPM | RESPIRATION RATE: 16 BRPM | DIASTOLIC BLOOD PRESSURE: 84 MMHG | BODY MASS INDEX: 43.5 KG/M2 | WEIGHT: 287 LBS | TEMPERATURE: 97.2 F

## 2024-07-24 DIAGNOSIS — E53.8 LOW SERUM VITAMIN B12: ICD-10-CM

## 2024-07-24 DIAGNOSIS — M10.079 ACUTE IDIOPATHIC GOUT OF ANKLE, UNSPECIFIED LATERALITY: ICD-10-CM

## 2024-07-24 DIAGNOSIS — R79.89 LFT ELEVATION: ICD-10-CM

## 2024-07-24 DIAGNOSIS — E03.9 HYPOTHYROIDISM, ACQUIRED: Chronic | ICD-10-CM

## 2024-07-24 DIAGNOSIS — F31.81 BIPOLAR 2 DISORDER: ICD-10-CM

## 2024-07-24 DIAGNOSIS — G47.33 OBSTRUCTIVE SLEEP APNEA: ICD-10-CM

## 2024-07-24 DIAGNOSIS — E55.9 VITAMIN D DEFICIENCY DISEASE: ICD-10-CM

## 2024-07-24 DIAGNOSIS — E78.2 MIXED HYPERLIPIDEMIA: ICD-10-CM

## 2024-07-24 DIAGNOSIS — I10 PRIMARY HYPERTENSION: Primary | ICD-10-CM

## 2024-07-24 PROCEDURE — 99214 OFFICE O/P EST MOD 30 MIN: CPT | Performed by: PHYSICIAN ASSISTANT

## 2024-07-24 RX ORDER — OMEGA-3-ACID ETHYL ESTERS 1 G/1
2 CAPSULE, LIQUID FILLED ORAL 2 TIMES DAILY
Qty: 360 CAPSULE | Refills: 3 | Status: SHIPPED | OUTPATIENT
Start: 2024-07-24

## 2024-07-24 RX ORDER — AMLODIPINE BESYLATE 10 MG/1
10 TABLET ORAL DAILY
Qty: 90 TABLET | Refills: 3 | Status: SHIPPED | OUTPATIENT
Start: 2024-07-24

## 2024-07-24 RX ORDER — NEBIVOLOL 5 MG/1
5 TABLET ORAL DAILY
Qty: 90 TABLET | Refills: 1 | Status: SHIPPED | OUTPATIENT
Start: 2024-07-24

## 2024-07-24 RX ORDER — LEVOTHYROXINE SODIUM 0.03 MG/1
TABLET ORAL
Qty: 90 TABLET | Refills: 3 | Status: SHIPPED | OUTPATIENT
Start: 2024-07-24

## 2024-07-24 RX ORDER — PREDNISONE 20 MG/1
20 TABLET ORAL 2 TIMES DAILY
Qty: 10 TABLET | Refills: 2 | Status: SHIPPED | OUTPATIENT
Start: 2024-07-24

## 2024-07-24 RX ORDER — VALSARTAN 320 MG/1
320 TABLET ORAL DAILY
Qty: 90 TABLET | Refills: 1 | Status: SHIPPED | OUTPATIENT
Start: 2024-07-24

## 2024-07-24 RX ORDER — SEMAGLUTIDE 1 MG/.5ML
1 INJECTION, SOLUTION SUBCUTANEOUS WEEKLY
Qty: 2 ML | Refills: 5 | Status: SHIPPED | OUTPATIENT
Start: 2024-07-24

## 2024-07-25 LAB
25(OH)D3+25(OH)D2 SERPL-MCNC: 40.5 NG/ML (ref 30–100)
ALBUMIN SERPL-MCNC: 4.5 G/DL (ref 4.1–5.1)
ALP SERPL-CCNC: 62 IU/L (ref 44–121)
ALT SERPL-CCNC: 31 IU/L (ref 0–44)
APPEARANCE UR: CLEAR
AST SERPL-CCNC: 20 IU/L (ref 0–40)
BACTERIA #/AREA URNS HPF: ABNORMAL /[HPF]
BASOPHILS # BLD AUTO: 0.1 X10E3/UL (ref 0–0.2)
BASOPHILS NFR BLD AUTO: 1 %
BILIRUB SERPL-MCNC: 0.4 MG/DL (ref 0–1.2)
BILIRUB UR QL STRIP: NEGATIVE
BUN SERPL-MCNC: 16 MG/DL (ref 6–24)
BUN/CREAT SERPL: 14 (ref 9–20)
CALCIUM SERPL-MCNC: 9.5 MG/DL (ref 8.7–10.2)
CASTS URNS QL MICRO: ABNORMAL /LPF
CHLORIDE SERPL-SCNC: 102 MMOL/L (ref 96–106)
CHOLEST SERPL-MCNC: 185 MG/DL (ref 100–199)
CO2 SERPL-SCNC: 25 MMOL/L (ref 20–29)
COLOR UR: YELLOW
CREAT SERPL-MCNC: 1.16 MG/DL (ref 0.76–1.27)
CRYSTALS URNS MICRO: ABNORMAL
EGFRCR SERPLBLD CKD-EPI 2021: 78 ML/MIN/1.73
EOSINOPHIL # BLD AUTO: 0.3 X10E3/UL (ref 0–0.4)
EOSINOPHIL NFR BLD AUTO: 5 %
EPI CELLS #/AREA URNS HPF: ABNORMAL /HPF (ref 0–10)
ERYTHROCYTE [DISTWIDTH] IN BLOOD BY AUTOMATED COUNT: 14.3 % (ref 11.6–15.4)
FOLATE SERPL-MCNC: 13.4 NG/ML
GLOBULIN SER CALC-MCNC: 2.1 G/DL (ref 1.5–4.5)
GLUCOSE SERPL-MCNC: 94 MG/DL (ref 70–99)
GLUCOSE UR QL STRIP: NEGATIVE
HBA1C MFR BLD: 5.3 % (ref 4.8–5.6)
HCT VFR BLD AUTO: 43 % (ref 37.5–51)
HDLC SERPL-MCNC: 33 MG/DL
HGB BLD-MCNC: 14.2 G/DL (ref 13–17.7)
HGB UR QL STRIP: NEGATIVE
IMM GRANULOCYTES # BLD AUTO: 0 X10E3/UL (ref 0–0.1)
IMM GRANULOCYTES NFR BLD AUTO: 0 %
KETONES UR QL STRIP: NEGATIVE
LDLC SERPL CALC-MCNC: 90 MG/DL (ref 0–99)
LEUKOCYTE ESTERASE UR QL STRIP: NEGATIVE
LYMPHOCYTES # BLD AUTO: 2.4 X10E3/UL (ref 0.7–3.1)
LYMPHOCYTES NFR BLD AUTO: 42 %
MCH RBC QN AUTO: 27.8 PG (ref 26.6–33)
MCHC RBC AUTO-ENTMCNC: 33 G/DL (ref 31.5–35.7)
MCV RBC AUTO: 84 FL (ref 79–97)
MICRO URNS: NORMAL
MICRO URNS: NORMAL
MONOCYTES # BLD AUTO: 0.5 X10E3/UL (ref 0.1–0.9)
MONOCYTES NFR BLD AUTO: 9 %
MUCOUS THREADS URNS QL MICRO: PRESENT
NEUTROPHILS # BLD AUTO: 2.5 X10E3/UL (ref 1.4–7)
NEUTROPHILS NFR BLD AUTO: 43 %
NITRITE UR QL STRIP: NEGATIVE
PH UR STRIP: 5.5 [PH] (ref 5–7.5)
PLATELET # BLD AUTO: 182 X10E3/UL (ref 150–450)
POTASSIUM SERPL-SCNC: 4.3 MMOL/L (ref 3.5–5.2)
PROT SERPL-MCNC: 6.6 G/DL (ref 6–8.5)
PROT UR QL STRIP: NEGATIVE
RBC # BLD AUTO: 5.11 X10E6/UL (ref 4.14–5.8)
RBC #/AREA URNS HPF: ABNORMAL /HPF (ref 0–2)
SODIUM SERPL-SCNC: 139 MMOL/L (ref 134–144)
SP GR UR STRIP: 1.02 (ref 1–1.03)
T3FREE SERPL-MCNC: 3.4 PG/ML (ref 2–4.4)
T4 FREE SERPL-MCNC: 1.2 NG/DL (ref 0.82–1.77)
TRIGL SERPL-MCNC: 374 MG/DL (ref 0–149)
TSH SERPL DL<=0.005 MIU/L-ACNC: 1.87 UIU/ML (ref 0.45–4.5)
UNIDENT CRYS URNS QL MICRO: PRESENT
UROBILINOGEN UR STRIP-MCNC: 0.2 MG/DL (ref 0.2–1)
VIT B12 SERPL-MCNC: 1229 PG/ML (ref 232–1245)
VLDLC SERPL CALC-MCNC: 62 MG/DL (ref 5–40)
WBC # BLD AUTO: 5.8 X10E3/UL (ref 3.4–10.8)
WBC #/AREA URNS HPF: ABNORMAL /HPF (ref 0–5)

## 2024-08-02 ENCOUNTER — OFFICE VISIT (OUTPATIENT)
Dept: SLEEP MEDICINE | Facility: HOSPITAL | Age: 49
End: 2024-08-02
Payer: COMMERCIAL

## 2024-08-02 VITALS — HEIGHT: 68 IN | HEART RATE: 91 BPM | BODY MASS INDEX: 41.98 KG/M2 | OXYGEN SATURATION: 99 % | WEIGHT: 277 LBS

## 2024-08-02 DIAGNOSIS — E66.01 MORBID OBESITY: ICD-10-CM

## 2024-08-02 DIAGNOSIS — G47.33 SEVERE OBSTRUCTIVE SLEEP APNEA: Primary | ICD-10-CM

## 2024-08-02 DIAGNOSIS — G47.26 CIRCADIAN RHYTHM SLEEP DISORDER, SHIFT WORK TYPE: ICD-10-CM

## 2024-08-02 PROCEDURE — G0463 HOSPITAL OUTPT CLINIC VISIT: HCPCS

## 2024-08-02 NOTE — PROGRESS NOTES
"[facility] Up [facility] Note     Chief Complaint:  MIKAL     Primary Care Physician: Gi Phan PA-C    Interval History:   The patient is a 49 y.o. male  who was last seen in the sleep lab: 1/31/2024.  Presents today for follow-up on MIKAL.  HST showed AHI 28.9 lowest SpO2 77% on auto CPAP 8-20.  No problems with mask machine hypersomnia nonrestorative sleep.    Downloaded PAP Data Reviewed For Compliance:  DME is [employer].  Downloads between 1/20/2024 - 7/30/2024.  Average usage is 6 hours 24 minutes.  Average AHI is 1.2.  Average auto CPAP pressure is 10.8 cm H2O    I have reviewed the above results and compared them with the patient's last downloads and reviewed with the patient.    Review of Systems:    A complete review of systems was done and all were negative with the exception of nasal congestion    Social History:    Social History     Socioeconomic History    Marital status:    Tobacco Use    Smoking status: Former     Types: Cigarettes     Passive exposure: Never    Smokeless tobacco: Never   Vaping Use    Vaping status: Former   Substance and Sexual Activity    Alcohol use: Yes     Comment: casual    Drug use: No    Sexual activity: Defer       Allergies:  Penicillins     Medication Review:  Reviewed.      Vital Signs:    Vitals:    08/02/24 0815   Pulse: 91   SpO2: 99%   Weight: 126 kg (277 lb)   Height: 172.7 cm (68\")     Body mass index is 42.12 kg/m².    Physical Exam:    Constitutional:  Well developed 49 y.o. male that appears in no apparent distress.  Awake & oriented times 3.  Normal mood with normal recent and remote memory and normal judgement.  Eyes:  Conjunctivae normal.  Oropharynx: Previously, moist mucous membranes.    Self-administered [place] Sleepiness Scale test results: 4  0-5 Lower normal daytime sleepiness  6-10 Higher normal daytime sleepiness  11-12 Mild, 13-15 Moderate, & 16-24 Severe excessive daytime sleepiness    Impression:   1. Severe obstructive sleep " apnea    2. Morbid obesity    3. Circadian rhythm sleep disorder, shift work type        Obstructive sleep apnea adequately treated with auto CPAP 8-20. The patient appears to be at goal with good compliance and usage. The patient has no complaints of hypersomnolence.    Plan:  Good sleep hygiene measures should be maintained.  Weight loss would be beneficial in this patient who is morbidly obese by Body mass index is 42.12 kg/m²..      After evaluating the patient and assessing results available, the patient is benefiting from the treatment being provided.     The patient will continue CPAP.  After clinical evaluation and review of downloads, I recommend no changes to the patient's pressures.  A new prescription will be sent to the patient's DME.    Caution during activities that require prolonged concentration is strongly advised if sleepiness returns. Changing of PAP supplies regularly is important for effective use. Patient needs to change cushion on the mask or plugs on nasal pillows along with disposable filters once every month and change mask frame, tubing, headgear and Velcro straps every 6 months at the minimum.    I answered all of the patient's questions.  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      Edward Jimenez MD  Sleep Medicine  08/02/24  08:30 EDT

## 2024-10-12 RX ORDER — ROSUVASTATIN CALCIUM 10 MG/1
10 TABLET, COATED ORAL DAILY
Qty: 90 TABLET | Refills: 3 | Status: SHIPPED | OUTPATIENT
Start: 2024-10-12

## 2025-01-24 RX ORDER — SEMAGLUTIDE 1.7 MG/.75ML
1.7 INJECTION, SOLUTION SUBCUTANEOUS WEEKLY
Qty: 3 ML | Refills: 11 | Status: SHIPPED | OUTPATIENT
Start: 2025-01-24

## 2025-02-12 RX ORDER — VALSARTAN 320 MG/1
320 TABLET ORAL DAILY
Qty: 90 TABLET | Refills: 1 | Status: SHIPPED | OUTPATIENT
Start: 2025-02-12

## 2025-02-12 NOTE — TELEPHONE ENCOUNTER
Rx Refill Note  Requested Prescriptions     Pending Prescriptions Disp Refills    valsartan (DIOVAN) 320 MG tablet [Pharmacy Med Name: VALSARTAN 320 MG TABLET] 90 tablet 1     Sig: TAKE 1 TABLET BY MOUTH DAILY FOR BLOOD PRESSURE      Last office visit with prescribing clinician: 7/24/2024   Last telemedicine visit with prescribing clinician: Visit date not found   Next office visit with prescribing clinician: 2/25/2025                         Would you like a call back once the refill request has been completed: [] Yes [] No    If the office needs to give you a call back, can they leave a voicemail: [] Yes [] No    Virginie Davis MA  02/12/25, 14:17 EST

## 2025-02-25 ENCOUNTER — OFFICE VISIT (OUTPATIENT)
Dept: FAMILY MEDICINE CLINIC | Facility: CLINIC | Age: 50
End: 2025-02-25
Payer: COMMERCIAL

## 2025-02-25 VITALS
BODY MASS INDEX: 43.04 KG/M2 | RESPIRATION RATE: 16 BRPM | SYSTOLIC BLOOD PRESSURE: 120 MMHG | TEMPERATURE: 97.7 F | WEIGHT: 284 LBS | HEIGHT: 68 IN | HEART RATE: 74 BPM | DIASTOLIC BLOOD PRESSURE: 78 MMHG | OXYGEN SATURATION: 96 %

## 2025-02-25 DIAGNOSIS — E55.9 VITAMIN D DEFICIENCY DISEASE: ICD-10-CM

## 2025-02-25 DIAGNOSIS — E53.8 LOW SERUM VITAMIN B12: ICD-10-CM

## 2025-02-25 DIAGNOSIS — Z12.11 SCREEN FOR COLON CANCER: ICD-10-CM

## 2025-02-25 DIAGNOSIS — E03.9 HYPOTHYROIDISM, ACQUIRED: Chronic | ICD-10-CM

## 2025-02-25 DIAGNOSIS — M10.00 ACUTE IDIOPATHIC GOUT, UNSPECIFIED SITE: ICD-10-CM

## 2025-02-25 DIAGNOSIS — F31.81 BIPOLAR 2 DISORDER: ICD-10-CM

## 2025-02-25 DIAGNOSIS — I10 PRIMARY HYPERTENSION: Primary | ICD-10-CM

## 2025-02-25 DIAGNOSIS — E66.01 MORBIDLY OBESE: ICD-10-CM

## 2025-02-25 DIAGNOSIS — G47.33 OBSTRUCTIVE SLEEP APNEA: ICD-10-CM

## 2025-02-25 DIAGNOSIS — Z13.6 SCREENING FOR HEART DISEASE: ICD-10-CM

## 2025-02-25 DIAGNOSIS — E78.2 MIXED HYPERLIPIDEMIA: ICD-10-CM

## 2025-02-25 PROCEDURE — 99214 OFFICE O/P EST MOD 30 MIN: CPT | Performed by: PHYSICIAN ASSISTANT

## 2025-02-25 RX ORDER — COLCHICINE 0.6 MG/1
TABLET ORAL
Qty: 18 TABLET | Refills: 2 | Status: SHIPPED | OUTPATIENT
Start: 2025-02-25

## 2025-02-25 RX ORDER — LEVOTHYROXINE SODIUM 25 UG/1
TABLET ORAL
Qty: 90 TABLET | Refills: 3 | Status: SHIPPED | OUTPATIENT
Start: 2025-02-25

## 2025-02-25 RX ORDER — VALSARTAN 320 MG/1
320 TABLET ORAL DAILY
Qty: 90 TABLET | Refills: 1 | Status: SHIPPED | OUTPATIENT
Start: 2025-02-25

## 2025-02-25 RX ORDER — OMEGA-3-ACID ETHYL ESTERS 1 G/1
2 CAPSULE, LIQUID FILLED ORAL 2 TIMES DAILY
Qty: 360 CAPSULE | Refills: 3 | Status: SHIPPED | OUTPATIENT
Start: 2025-02-25

## 2025-02-25 RX ORDER — ROSUVASTATIN CALCIUM 10 MG/1
10 TABLET, COATED ORAL DAILY
Qty: 90 TABLET | Refills: 3 | Status: SHIPPED | OUTPATIENT
Start: 2025-02-25

## 2025-02-25 RX ORDER — PREDNISONE 20 MG/1
20 TABLET ORAL 2 TIMES DAILY
Qty: 10 TABLET | Refills: 2 | Status: SHIPPED | OUTPATIENT
Start: 2025-02-25

## 2025-02-25 RX ORDER — NEBIVOLOL 5 MG/1
5 TABLET ORAL DAILY
Qty: 90 TABLET | Refills: 1 | Status: SHIPPED | OUTPATIENT
Start: 2025-02-25

## 2025-02-25 RX ORDER — ALLOPURINOL 300 MG/1
300 TABLET ORAL DAILY
Qty: 90 TABLET | Refills: 3 | Status: SHIPPED | OUTPATIENT
Start: 2025-02-25

## 2025-02-25 RX ORDER — AMLODIPINE BESYLATE 10 MG/1
10 TABLET ORAL DAILY
Qty: 90 TABLET | Refills: 1 | Status: SHIPPED | OUTPATIENT
Start: 2025-02-25

## 2025-02-25 NOTE — PATIENT INSTRUCTIONS
Exercising to Lose Weight  Getting regular exercise is important for everyone. It is especially important if you are overweight. Being overweight increases your risk of heart disease, stroke, diabetes, high blood pressure, and several types of cancer. Exercising, and reducing the calories you consume, can help you lose weight and improve fitness and health.  Exercise can be moderate or vigorous intensity. To lose weight, most people need to do a certain amount of moderate or vigorous-intensity exercise each week.  How can exercise affect me?  You lose weight when you exercise enough to burn more calories than you eat. Exercise also reduces body fat and builds muscle. The more muscle you have, the more calories you burn. Exercise also:  Improves mood.  Reduces stress and tension.  Improves your overall fitness, flexibility, and endurance.  Increases bone strength.  Moderate-intensity exercise    Moderate-intensity exercise is any activity that gets you moving enough to burn at least three times more energy (calories) than if you were sitting.  Examples of moderate exercise include:  Walking a mile in 15 minutes.  Doing light yard work.  Biking at an easy pace.  Most people should get at least 150 minutes of moderate-intensity exercise a week to maintain their body weight.  Vigorous-intensity exercise  Vigorous-intensity exercise is any activity that gets you moving enough to burn at least six times more calories than if you were sitting. When you exercise at this intensity, you should be working hard enough that you are not able to carry on a conversation.  Examples of vigorous exercise include:  Running.  Playing a team sport, such as football, basketball, and soccer.  Jumping rope.  Most people should get at least 75 minutes a week of vigorous exercise to maintain their body weight.  What actions can I take to lose weight?  The amount of exercise you need to lose weight depends on:  Your age.  The type of  exercise.  Any health conditions you have.  Your overall physical ability.  Talk to your health care provider about how much exercise you need and what types of activities are safe for you.  Nutrition    Make changes to your diet as told by your health care provider or diet and nutrition specialist (dietitian). This may include:  Eating fewer calories.  Eating more protein.  Eating less unhealthy fats.  Eating a diet that includes fresh fruits and vegetables, whole grains, low-fat dairy products, and lean protein.  Avoiding foods with added fat, salt, and sugar.  Drink plenty of water while you exercise to prevent dehydration or heat stroke.  Activity  Choose an activity that you enjoy and set realistic goals. Your health care provider can help you make an exercise plan that works for you.  Exercise at a moderate or vigorous intensity most days of the week.  The intensity of exercise may vary from person to person. You can tell how intense a workout is for you by paying attention to your breathing and heartbeat. Most people will notice their breathing and heartbeat get faster with more intense exercise.  Do resistance training twice each week, such as:  Push-ups.  Sit-ups.  Lifting weights.  Using resistance bands.  Getting short amounts of exercise can be just as helpful as long, structured periods of exercise. If you have trouble finding time to exercise, try doing these things as part of your daily routine:  Get up, stretch, and walk around every 30 minutes throughout the day.  Go for a walk during your lunch break.  Park your car farther away from your destination.  If you take public transportation, get off one stop early and walk the rest of the way.  Make phone calls while standing up and walking around.  Take the stairs instead of elevators or escalators.  Wear comfortable clothes and shoes with good support.  Do not exercise so much that you hurt yourself, feel dizzy, or get very short of breath.  Where to  find more information  [place] Department of Health and Human Services: www.hhs.gov  Centers for Disease Control and Prevention: www.cdc.gov  Contact a health care provider:  Before starting a new exercise program.  If you have questions or concerns about your weight.  If you have a medical problem that keeps you from exercising.  Get help right away if:  You have any of the following while exercising:  Injury.  Dizziness.  Difficulty breathing or shortness of breath that does not go away when you stop exercising.  Chest pain.  Rapid heartbeat.  These symptoms may represent a serious problem that is an emergency. Do not wait to see if the symptoms will go away. Get medical help right away. Call your local emergency services (911 in the [place] Do not drive yourself to the hospital.  Summary  Getting regular exercise is especially important if you are overweight.  Being overweight increases your risk of heart disease, stroke, diabetes, high blood pressure, and several types of cancer.  Losing weight happens when you burn more calories than you eat.  Reducing the amount of calories you eat, and getting regular moderate or vigorous exercise each week, helps you lose weight.  This information is not intended to replace advice given to you by your health care provider. Make sure you discuss any questions you have with your health care provider.  Document Revised: 02/13/2022 Document Reviewed: 02/13/2022  Elsevier Patient Education © 2024 [employer]vier Inc.

## 2025-02-25 NOTE — PROGRESS NOTES
"Subjective   Brandan Higuera is a 49 y.o. male.     History of Present Illness    Since the last visit, he has overall felt well.  He has Primary Hypertension and well controlled on current medication, Impaired fasting glucose and will monitor labs to watch for DMII, Hyperlipidemia with goals met with current Rx, Hypothyroidism well controlled on current medication and will continue Rx, and Vitamin D deficiency and labs are at goal >30 ng/mL.  he has been compliant with current medications have reviewed them.  The patient denies medication side effects.  Will refill medications. /78   Pulse 74   Temp 97.7 °F (36.5 °C) (Temporal)   Resp 16   Ht 172.7 cm (68\")   Wt 129 kg (284 lb)   SpO2 96%   BMI 43.18 kg/m² .          Results for orders placed or performed in visit on 07/24/24   Comprehensive metabolic panel    Collection Time: 07/24/24  9:01 AM    Specimen: Blood   Result Value Ref Range    Glucose 94 70 - 99 mg/dL    BUN 16 6 - 24 mg/dL    Creatinine 1.16 0.76 - 1.27 mg/dL    EGFR Result 78 >59 mL/min/1.73    BUN/Creatinine Ratio 14 9 - 20    Sodium 139 134 - 144 mmol/L    Potassium 4.3 3.5 - 5.2 mmol/L    Chloride 102 96 - 106 mmol/L    Total CO2 25 20 - 29 mmol/L    Calcium 9.5 8.7 - 10.2 mg/dL    Total Protein 6.6 6.0 - 8.5 g/dL    Albumin 4.5 4.1 - 5.1 g/dL    Globulin 2.1 1.5 - 4.5 g/dL    Total Bilirubin 0.4 0.0 - 1.2 mg/dL    Alkaline Phosphatase 62 44 - 121 IU/L    AST (SGOT) 20 0 - 40 IU/L    ALT (SGPT) 31 0 - 44 IU/L   Lipid panel    Collection Time: 07/24/24  9:01 AM    Specimen: Blood   Result Value Ref Range    Total Cholesterol 185 100 - 199 mg/dL    Triglycerides 374 (H) 0 - 149 mg/dL    HDL Cholesterol 33 (L) >39 mg/dL    VLDL Cholesterol Michael 62 (H) 5 - 40 mg/dL    LDL Chol Calc (NIH) 90 0 - 99 mg/dL   TSH    Collection Time: 07/24/24  9:01 AM    Specimen: Blood   Result Value Ref Range    TSH 1.870 0.450 - 4.500 uIU/mL   Hemoglobin A1c    Collection Time: 07/24/24  9:01 AM    " Specimen: Blood   Result Value Ref Range    Hemoglobin A1C 5.3 4.8 - 5.6 %   T4, Free    Collection Time: 07/24/24  9:01 AM    Specimen: Blood   Result Value Ref Range    Free T4 1.20 0.82 - 1.77 ng/dL   Vitamin D,25-Hydroxy    Collection Time: 07/24/24  9:01 AM    Specimen: Blood   Result Value Ref Range    25 Hydroxy, Vitamin D 40.5 30.0 - 100.0 ng/mL   Vitamin B12    Collection Time: 07/24/24  9:01 AM    Specimen: Blood   Result Value Ref Range    Vitamin B-12 1,229 232 - 1,245 pg/mL   Folate    Collection Time: 07/24/24  9:01 AM    Specimen: Blood   Result Value Ref Range    Folate 13.4 >3.0 ng/mL   T3, Free    Collection Time: 07/24/24  9:01 AM    Specimen: Blood   Result Value Ref Range    T3, Free 3.4 2.0 - 4.4 pg/mL   Microscopic Examination -    Collection Time: 07/24/24  9:01 AM   Result Value Ref Range    WBC, UA None seen 0 - 5 /hpf    RBC, UA None seen 0 - 2 /hpf    Epithelial Cells (non renal) None seen 0 - 10 /hpf    Casts None seen None seen /lpf    Crystals, UA Present (A) N/A    Crystal Type Calcium Oxalate N/A    Mucus, UA Present Not Estab.    Bacteria, UA None seen None seen/Few   CBC and Differential    Collection Time: 07/24/24  9:01 AM    Specimen: Blood   Result Value Ref Range    WBC 5.8 3.4 - 10.8 x10E3/uL    RBC 5.11 4.14 - 5.80 x10E6/uL    Hemoglobin 14.2 13.0 - 17.7 g/dL    Hematocrit 43.0 37.5 - 51.0 %    MCV 84 79 - 97 fL    MCH 27.8 26.6 - 33.0 pg    MCHC 33.0 31.5 - 35.7 g/dL    RDW 14.3 11.6 - 15.4 %    Platelets 182 150 - 450 x10E3/uL    Neutrophil Rel % 43 Not Estab. %    Lymphocyte Rel % 42 Not Estab. %    Monocyte Rel % 9 Not Estab. %    Eosinophil Rel % 5 Not Estab. %    Basophil Rel % 1 Not Estab. %    Neutrophils Absolute 2.5 1.4 - 7.0 x10E3/uL    Lymphocytes Absolute 2.4 0.7 - 3.1 x10E3/uL    Monocytes Absolute 0.5 0.1 - 0.9 x10E3/uL    Eosinophils Absolute 0.3 0.0 - 0.4 x10E3/uL    Basophils Absolute 0.1 0.0 - 0.2 x10E3/uL    Immature Granulocyte Rel % 0 Not Estab. %     Immature Grans Absolute 0.0 0.0 - 0.1 x10E3/uL   Urinalysis With Microscopic - Urine, Clean Catch    Collection Time: 07/24/24  9:01 AM    Specimen: Urine, Clean Catch   Result Value Ref Range    Specific Gravity, UA 1.022 1.005 - 1.030    pH, UA 5.5 5.0 - 7.5    Color, UA Yellow Yellow    Appearance, UA Clear Clear    Leukocytes, UA Negative Negative    Protein Negative Negative/Trace    Glucose, UA Negative Negative    Ketones Negative Negative    Blood, UA Negative Negative    Bilirubin, UA Negative Negative    Urobilinogen, UA 0.2 0.2 - 1.0 mg/dL    Nitrite, UA Negative Negative    Microscopic Examination Comment     Microscopic Examination See below:      Note that last liver enzymes were back in normal range and it had not been for 2 years on his 7/24/2024 labs with the semaglutide  Also noted his hemoglobin A1c continues to decrease with the semaglutide and his blood pressure is remaining controlled with the weight loss  No recent gout remains on allopurinol for suppression does have colchicine for flareups and prednisone  Use Cpap/Bipap every night  Saw Dr Jimenez saw him 8-2-24  Down 18 lbs  Has seen rheumatologist Dr. Malcolm for his management of gout and remains on allopurinol for suppression.  Sees [facility] for bipolar disorder and remains on medication management.  Started him on semaglutide for management of his obesity after this last visit 11/29/2023--- I was also concerned about his fatty liver and weight loss would help treat this-------down 18 lbs.  He needs to work out to preserve muscle tone with taking semaglutide.  Also need to get weight down and will increase dose semaglutide---tolerates well      The following portions of the patient's history were reviewed and updated as appropriate: allergies, current medications, past family history, past medical history, past social history, past surgical history, and problem list.    Review of Systems    Objective   Physical  Exam      Assessment & Plan   Diagnoses and all orders for this visit:    1. Primary hypertension (Primary)  -     Comprehensive metabolic panel; Future  -     Lipid panel; Future  -     CBC and Differential; Future  -     TSH; Future  -     Hemoglobin A1c; Future  -     T4, Free; Future  -     T3, Free; Future  -     Vitamin D,25-Hydroxy; Future  -     Vitamin B12; Future  -     Folate; Future  -     Urinalysis With Microscopic - Urine, Clean Catch; Future  -     Uric Acid; Future    2. Obstructive sleep apnea  -     Comprehensive metabolic panel; Future  -     Lipid panel; Future  -     CBC and Differential; Future  -     TSH; Future  -     Hemoglobin A1c; Future  -     T4, Free; Future  -     T3, Free; Future  -     Vitamin D,25-Hydroxy; Future  -     Vitamin B12; Future  -     Folate; Future  -     Urinalysis With Microscopic - Urine, Clean Catch; Future  -     Uric Acid; Future    3. Mixed hyperlipidemia  -     Comprehensive metabolic panel; Future  -     Lipid panel; Future  -     CBC and Differential; Future  -     TSH; Future  -     Hemoglobin A1c; Future  -     T4, Free; Future  -     T3, Free; Future  -     Vitamin D,25-Hydroxy; Future  -     Vitamin B12; Future  -     Folate; Future  -     Urinalysis With Microscopic - Urine, Clean Catch; Future  -     Uric Acid; Future    4. Hypothyroidism, acquired  -     Comprehensive metabolic panel; Future  -     Lipid panel; Future  -     CBC and Differential; Future  -     TSH; Future  -     Hemoglobin A1c; Future  -     T4, Free; Future  -     T3, Free; Future  -     Vitamin D,25-Hydroxy; Future  -     Vitamin B12; Future  -     Folate; Future  -     Urinalysis With Microscopic - Urine, Clean Catch; Future  -     Uric Acid; Future    5. Bipolar 2 disorder  -     Comprehensive metabolic panel; Future  -     Lipid panel; Future  -     CBC and Differential; Future  -     TSH; Future  -     Hemoglobin A1c; Future  -     T4, Free; Future  -     T3, Free; Future  -      Vitamin D,25-Hydroxy; Future  -     Vitamin B12; Future  -     Folate; Future  -     Urinalysis With Microscopic - Urine, Clean Catch; Future  -     Uric Acid; Future    6. Morbidly obese  -     Comprehensive metabolic panel; Future  -     Lipid panel; Future  -     CBC and Differential; Future  -     TSH; Future  -     Hemoglobin A1c; Future  -     T4, Free; Future  -     T3, Free; Future  -     Vitamin D,25-Hydroxy; Future  -     Vitamin B12; Future  -     Folate; Future  -     Urinalysis With Microscopic - Urine, Clean Catch; Future  -     Uric Acid; Future    7. Vitamin D deficiency disease  -     Comprehensive metabolic panel; Future  -     Lipid panel; Future  -     CBC and Differential; Future  -     TSH; Future  -     Hemoglobin A1c; Future  -     T4, Free; Future  -     T3, Free; Future  -     Vitamin D,25-Hydroxy; Future  -     Vitamin B12; Future  -     Folate; Future  -     Urinalysis With Microscopic - Urine, Clean Catch; Future  -     Uric Acid; Future    8. Low serum vitamin B12  -     Comprehensive metabolic panel; Future  -     Lipid panel; Future  -     CBC and Differential; Future  -     TSH; Future  -     Hemoglobin A1c; Future  -     T4, Free; Future  -     T3, Free; Future  -     Vitamin D,25-Hydroxy; Future  -     Vitamin B12; Future  -     Folate; Future  -     Urinalysis With Microscopic - Urine, Clean Catch; Future  -     Uric Acid; Future    9. Acute idiopathic gout, unspecified site  -     Comprehensive metabolic panel; Future  -     Lipid panel; Future  -     CBC and Differential; Future  -     TSH; Future  -     Hemoglobin A1c; Future  -     T4, Free; Future  -     T3, Free; Future  -     Vitamin D,25-Hydroxy; Future  -     Vitamin B12; Future  -     Folate; Future  -     Urinalysis With Microscopic - Urine, Clean Catch; Future  -     Uric Acid; Future    10. Screening for heart disease  -     CT Cardiac Calcium Score Without Dye; Future  -     Comprehensive metabolic panel; Future  -      Lipid panel; Future  -     CBC and Differential; Future  -     TSH; Future  -     Hemoglobin A1c; Future  -     T4, Free; Future  -     T3, Free; Future  -     Vitamin D,25-Hydroxy; Future  -     Vitamin B12; Future  -     Folate; Future  -     Urinalysis With Microscopic - Urine, Clean Catch; Future  -     Uric Acid; Future         Plan, Brandan Higuera, was seen today.  he was seen for HTN and continue medication, Imparied fasting glucose and plan follow up labs, diet, and exercise, Hyperlipidemia and will continue current medication, Hypothyroidism well controlled, continue medication, and Vitamin D deficiency and supplemented.  exercise  Sees psychiatry for treatment of bipolar disorder  Consider calcium cardiac CT score to screen for heart disease  Update labs  Avoiding HCTZ due to history of gout  Continue allopurinol for gout suppression will update a prescription for colchicine for acute flareup and prednisone  Use Cpap/Bipap every night

## 2025-03-25 NOTE — TELEPHONE ENCOUNTER
[facility]  Office: [phone]  Hira Hdz DO, Freddy Light DO, Noe Loyd DO, Kunal Conner DO, Asad Tinoco MD, Cassie Gamez MD, Jayden Copeland MD, Kindra Javier MD,  Rambo Auguste MD, Inna Spaulding MD, Cherelle Moyer MD,  Elda Sands DO, Marshal Murry MD, Cory López MD, Avinash Hdz DO, Maureen Whaley MD,  Jim Alaniz DO, Vicenta Fierro MD, Mili Bravo MD, Elis Pink MD, Tram Emanuel MD,  Ricki Rodrigues MD, Susanna Jeffries MD, Lillian Rodriguez MD, Brandon Hernández MD, Braxton Castillo MD, Alysa Cui MD, Jose Finley DO, Terence Weiner MD, Elda Lowery MD, Mohsin Reza, MD, Shirley Waterhouse, CNP,  Gretta Cruz CNP, Jose Condon, CNP,  Alexa Chavez, DNP, Sophie Yates, CNP, Della Hilliard, CNP, Zoë Pang, CNP, Dania Salazar CNP, Kelsie Cardona, PA-C, Princess Feliz, CNP, Mandi Alonzo, CNP,  Michelle Shannon, CNP, Britta Gilmore, CNP, Dary Bravo, CNP,  Alicia Harrington, CNS, Tish Rodriguez CNP, Lita Camacho CNP,   Ilana Parker, CNP         [facility]   IN-PATIENT SERVICE   [facility]    Progress Note    3/25/2025    9:10 AM    Name:   Tasha Bond  MRN:     [id number]     Acct:      [id number]   Room:   [facility] Day:  5  Admit Date:  3/20/2025  9:24 AM    PCP:   Paulino Pa MD  Code Status:  DNR-CCA    Subjective:     C/C: SOB  Interval History Status: improved.     Patient seen and examined at bedside this morning.  No acute events overnight.  Resting comfortably in bedside chair.  Resting on 3 L O2 nasal cannula.  Plan is for radiation therapy today.  Last session will be tomorrow and plan is for discharge home with home health care.  Pain is stable and well controlled    Brief History:     Tasha Bond is a 64 y.o. female with PMHx anxiety, metastatic adenocarcinoma with metastasis to lung, bone who presents as a transfer from [facility].  [facility].

## 2025-04-07 ENCOUNTER — APPOINTMENT (OUTPATIENT)
Dept: ULTRASOUND IMAGING | Facility: HOSPITAL | Age: 50
End: 2025-04-07
Payer: COMMERCIAL

## 2025-04-07 ENCOUNTER — HOSPITAL ENCOUNTER (EMERGENCY)
Facility: HOSPITAL | Age: 50
Discharge: HOME | End: 2025-04-07
Attending: STUDENT IN AN ORGANIZED HEALTH CARE EDUCATION/TRAINING PROGRAM | Admitting: STUDENT IN AN ORGANIZED HEALTH CARE EDUCATION/TRAINING PROGRAM
Payer: COMMERCIAL

## 2025-04-07 VITALS
DIASTOLIC BLOOD PRESSURE: 84 MMHG | BODY MASS INDEX: 40.32 KG/M2 | WEIGHT: 266 LBS | HEIGHT: 68 IN | TEMPERATURE: 97.5 F | OXYGEN SATURATION: 95 % | HEART RATE: 104 BPM | SYSTOLIC BLOOD PRESSURE: 159 MMHG | RESPIRATION RATE: 16 BRPM

## 2025-04-07 DIAGNOSIS — L03.116 CELLULITIS OF LEFT LOWER EXTREMITY: Primary | ICD-10-CM

## 2025-04-07 LAB
ANION GAP SERPL CALCULATED.3IONS-SCNC: 7.7 MMOL/L (ref 5–15)
BASOPHILS # BLD AUTO: 0.03 10*3/MM3 (ref 0–0.2)
BASOPHILS NFR BLD AUTO: 0.3 % (ref 0–1.5)
BUN SERPL-MCNC: 10 MG/DL (ref 6–20)
BUN/CREAT SERPL: 7.5 (ref 7–25)
CALCIUM SPEC-SCNC: 8.9 MG/DL (ref 8.6–10.5)
CHLORIDE SERPL-SCNC: 105 MMOL/L (ref 98–107)
CO2 SERPL-SCNC: 26.3 MMOL/L (ref 22–29)
CREAT SERPL-MCNC: 1.34 MG/DL (ref 0.76–1.27)
DEPRECATED RDW RBC AUTO: 43.7 FL (ref 37–54)
EGFRCR SERPLBLD CKD-EPI 2021: 65.8 ML/MIN/1.73
EOSINOPHIL # BLD AUTO: 0.16 10*3/MM3 (ref 0–0.4)
EOSINOPHIL NFR BLD AUTO: 1.5 % (ref 0.3–6.2)
ERYTHROCYTE [DISTWIDTH] IN BLOOD BY AUTOMATED COUNT: 13.8 % (ref 12.3–15.4)
GLUCOSE SERPL-MCNC: 114 MG/DL (ref 65–99)
HCT VFR BLD AUTO: 43 % (ref 37.5–51)
HGB BLD-MCNC: 14 G/DL (ref 13–17.7)
IMM GRANULOCYTES # BLD AUTO: 0.01 10*3/MM3 (ref 0–0.05)
IMM GRANULOCYTES NFR BLD AUTO: 0.1 % (ref 0–0.5)
LYMPHOCYTES # BLD AUTO: 1.91 10*3/MM3 (ref 0.7–3.1)
LYMPHOCYTES NFR BLD AUTO: 17.7 % (ref 19.6–45.3)
MCH RBC QN AUTO: 27.6 PG (ref 26.6–33)
MCHC RBC AUTO-ENTMCNC: 32.6 G/DL (ref 31.5–35.7)
MCV RBC AUTO: 84.8 FL (ref 79–97)
MONOCYTES # BLD AUTO: 1.03 10*3/MM3 (ref 0.1–0.9)
MONOCYTES NFR BLD AUTO: 9.5 % (ref 5–12)
NEUTROPHILS NFR BLD AUTO: 7.66 10*3/MM3 (ref 1.7–7)
NEUTROPHILS NFR BLD AUTO: 70.9 % (ref 42.7–76)
PLATELET # BLD AUTO: 143 10*3/MM3 (ref 140–450)
PMV BLD AUTO: 10.5 FL (ref 6–12)
POTASSIUM SERPL-SCNC: 4 MMOL/L (ref 3.5–5.2)
RBC # BLD AUTO: 5.07 10*6/MM3 (ref 4.14–5.8)
SODIUM SERPL-SCNC: 139 MMOL/L (ref 136–145)
WBC NRBC COR # BLD AUTO: 10.8 10*3/MM3 (ref 3.4–10.8)

## 2025-04-07 PROCEDURE — 99284 EMERGENCY DEPT VISIT MOD MDM: CPT

## 2025-04-07 PROCEDURE — 93971 EXTREMITY STUDY: CPT

## 2025-04-07 PROCEDURE — 80048 BASIC METABOLIC PNL TOTAL CA: CPT | Performed by: PHYSICIAN ASSISTANT

## 2025-04-07 PROCEDURE — 96365 THER/PROPH/DIAG IV INF INIT: CPT

## 2025-04-07 PROCEDURE — 25810000003 SODIUM CHLORIDE 0.9 % SOLUTION: Performed by: PHYSICIAN ASSISTANT

## 2025-04-07 PROCEDURE — 25010000002 CEFTRIAXONE PER 250 MG: Performed by: PHYSICIAN ASSISTANT

## 2025-04-07 PROCEDURE — 85025 COMPLETE CBC W/AUTO DIFF WBC: CPT | Performed by: PHYSICIAN ASSISTANT

## 2025-04-07 RX ORDER — ASPIRIN 81 MG/1
81 TABLET ORAL DAILY
Qty: 14 TABLET | Refills: 0 | Status: SHIPPED | OUTPATIENT
Start: 2025-04-07 | End: 2025-04-21

## 2025-04-07 RX ORDER — DOXYCYCLINE 100 MG/1
100 CAPSULE ORAL 2 TIMES DAILY
Qty: 20 CAPSULE | Refills: 0 | Status: SHIPPED | OUTPATIENT
Start: 2025-04-07 | End: 2025-04-12 | Stop reason: HOSPADM

## 2025-04-07 RX ORDER — CEPHALEXIN 500 MG/1
500 CAPSULE ORAL 3 TIMES DAILY
Qty: 30 CAPSULE | Refills: 0 | Status: SHIPPED | OUTPATIENT
Start: 2025-04-07 | End: 2025-04-12 | Stop reason: HOSPADM

## 2025-04-07 RX ADMIN — SODIUM CHLORIDE 1000 MG: 9 INJECTION, SOLUTION INTRAVENOUS at 12:53

## 2025-04-07 RX ADMIN — SODIUM CHLORIDE 1000 ML: 0.9 INJECTION, SOLUTION INTRAVENOUS at 12:51

## 2025-04-07 NOTE — Clinical Note
[facility]  [id number]  [place] [id number]    Brandan Higuera was seen and treated in our emergency department on 4/7/2025.  He may return to work on 04/10/2025.         Thank you for choosing [facility].    Naga Hollis III, PA

## 2025-04-07 NOTE — DISCHARGE INSTRUCTIONS
Take the antibiotics prescribed until completed.  I would also take a baby aspirin once daily for the next 2 weeks.  Avoid ibuprofen and naproxen.  Tylenol okay for pain.  Recommend off work and elevating the leg and resting for the next 3 days.  It will likely take at least 3 days free to start noticing significant improvement.  That said, this should not get any worse from this point forward.  Should it continue to spread, should you develop fever, or should you have any further concerns recommend return to the ER.  It would not be a bad idea to follow-up with primary care in a week's time to let her recheck your wound and recheck the renal function.

## 2025-04-07 NOTE — FSED PROVIDER NOTE
EMERGENCY DEPARTMENT ENCOUNTER    Room Number:  02/02  Date seen:  4/7/2025  Time seen: 11:18 EDT  PCP: Gi Phan, PAKatieC  Historian: Patient    Discussed/obtained information from independent historians: N/A    HPI:  Chief complaint: Left lower extremity swelling and redness    A complete HPI/ROS/PMH/PSH/SH/FH are unobtainable due to: Nothing  Context:Brandan Higuera is a 49 y.o. male with significant past medical history of gout, hypertension, hyperlipidemia, bipolar disorder, MIKAL who presents to the ED with c/o left lower extremity swelling and redness.  Patient reports symptoms ongoing for the past 2 to 3 days.  He does report chills.  No chest pain or shortness of breath.  No recent injury or trauma.  No significant associated pain.  Patient denies diabetes and known hypercoagulable state.  He is here for further evaluation.    External (non-ED) record review: Patient seen by Gi Phan 2/25/2025 in follow-up for hypertension, hyperlipidemia, impaired fasting glucose, vitamin D deficiency.  Routine labs are drawn and medications refilled.  Hemoglobin A1c in November 2023 was 5.4.    Chronic or social conditions impacting care:    ALLERGIES  Penicillins    PAST MEDICAL HISTORY  Active Ambulatory Problems     Diagnosis Date Noted    Hypertension 03/16/2016    Vitamin D deficiency disease 03/16/2016    Mixed hyperlipidemia 03/16/2016    Acute idiopathic gout of ankle 09/28/2016    Acute gout of ankle 01/17/2020    Morbidly obese 02/08/2021    Low serum vitamin B12 02/08/2021    Obstructive sleep apnea 11/29/2023    Hyperglycemia 11/29/2023    Hypothyroidism, acquired 11/29/2023     Resolved Ambulatory Problems     Diagnosis Date Noted    No Resolved Ambulatory Problems     Past Medical History:   Diagnosis Date    Allergic     Bipolar disorder     Hyperlipidemia     Insomnia        PAST SURGICAL HISTORY  History reviewed. No pertinent surgical history.    FAMILY HISTORY  Family History   Problem Relation  Age of Onset    Depression Mother     Diabetes Mother     Hyperlipidemia Mother     Hypertension Mother     Depression Sister     Heart disease Brother        SOCIAL HISTORY  Social History     Socioeconomic History    Marital status:    Tobacco Use    Smoking status: Former     Types: Cigarettes     Passive exposure: Never    Smokeless tobacco: Never   Vaping Use    Vaping status: Former   Substance and Sexual Activity    Alcohol use: Yes     Comment: casual    Drug use: No    Sexual activity: Defer       REVIEW OF SYSTEMS  Review of Systems    All systems reviewed and negative except for those discussed in HPI.     PHYSICAL EXAM    I have reviewed the triage vital signs and nursing notes.  Vitals:    04/07/25 1302   BP:    Pulse: 91   Resp:    Temp:    SpO2: 96%     Physical Exam    GENERAL: Very pleasant, nontoxic, not distressed  HENT: nares patent  EYES: no scleral icterus  NECK: no ROM limitations  CV: regular rhythm, regular rate, as below  RESPIRATORY: normal effort, lungs CTAB  ABDOMEN: soft  : deferred  MUSCULOSKELETAL: as below  NEURO: alert, moves all extremities, follows commands  SKIN: Red warm tender swollen left lower extremity starting at the ankle and extending to the lower portion of the calf.  The ankle itself and the foot appear uninvolved.  No pain with joint movement.  Compartments soft.  DP and PT pulses +2 bilaterally.    LAB RESULTS  Recent Results (from the past 24 hours)   Basic Metabolic Panel    Collection Time: 04/07/25 11:42 AM    Specimen: Blood   Result Value Ref Range    Glucose 114 (H) 65 - 99 mg/dL    BUN 10 6 - 20 mg/dL    Creatinine 1.34 (H) 0.76 - 1.27 mg/dL    Sodium 139 136 - 145 mmol/L    Potassium 4.0 3.5 - 5.2 mmol/L    Chloride 105 98 - 107 mmol/L    CO2 26.3 22.0 - 29.0 mmol/L    Calcium 8.9 8.6 - 10.5 mg/dL    BUN/Creatinine Ratio 7.5 7.0 - 25.0    Anion Gap 7.7 5.0 - 15.0 mmol/L    eGFR 65.8 >60.0 mL/min/1.73   CBC Auto Differential    Collection Time:  04/07/25 11:42 AM    Specimen: Blood   Result Value Ref Range    WBC 10.80 3.40 - 10.80 10*3/mm3    RBC 5.07 4.14 - 5.80 10*6/mm3    Hemoglobin 14.0 13.0 - 17.7 g/dL    Hematocrit 43.0 37.5 - 51.0 %    MCV 84.8 79.0 - 97.0 fL    MCH 27.6 26.6 - 33.0 pg    MCHC 32.6 31.5 - 35.7 g/dL    RDW 13.8 12.3 - 15.4 %    RDW-SD 43.7 37.0 - 54.0 fl    MPV 10.5 6.0 - 12.0 fL    Platelets 143 140 - 450 10*3/mm3    Neutrophil % 70.9 42.7 - 76.0 %    Lymphocyte % 17.7 (L) 19.6 - 45.3 %    Monocyte % 9.5 5.0 - 12.0 %    Eosinophil % 1.5 0.3 - 6.2 %    Basophil % 0.3 0.0 - 1.5 %    Immature Grans % 0.1 0.0 - 0.5 %    Neutrophils, Absolute 7.66 (H) 1.70 - 7.00 10*3/mm3    Lymphocytes, Absolute 1.91 0.70 - 3.10 10*3/mm3    Monocytes, Absolute 1.03 (H) 0.10 - 0.90 10*3/mm3    Eosinophils, Absolute 0.16 0.00 - 0.40 10*3/mm3    Basophils, Absolute 0.03 0.00 - 0.20 10*3/mm3    Immature Grans, Absolute 0.01 0.00 - 0.05 10*3/mm3       Ordered the above labs and independently interpreted results.  My findings will be discussed in the ED course or medical decision making section below    RADIOLOGY RESULTS  US Venous Doppler Lower Extremity Left (duplex)  Result Date: 4/7/2025  Patient: JAE FRAUSTO  Time Out: 13:26 Exam(s): US VENOUS LEFT LOWER EXTREMITY EXAM:   US Duplex Left Lower Extremity Veins CLINICAL HISTORY:    Reason for exam: R o DVT. TECHNIQUE:   Real-time duplex ultrasound scan of the left lower extremity veins integrating B-mode two-dimensional vascular structure, Doppler spectral analysis, color flow Doppler imaging and compression. COMPARISON:   No relevant prior studies available. FINDINGS:   Deep veins:  Unremarkable.  No DVT in the visualized common femoral, femoral, proximal deep femoral or popliteal veins.  The veins demonstrate normal color flow, are normally compressible, with normal phasic flow and or augmentation response.   Superficial veins:  Partially thrombosed superficial varicosity correlates with the patient's  area of concern.   Soft tissues:  No acute findings.  No popliteal cyst. IMPRESSION:      Negative for DVT.  Partially thrombosed superficial varicosity correlates with the patient's area of concern.     Electronically signed by Huan Higgins MD on 04-07-25 at 1326       Ordered the above noted radiological studies.  Independently interpreted by me.  My findings will be discussed in the medical decision section below.     PROGRESS, DATA ANALYSIS, CONSULTS AND MEDICAL DECISION MAKING    Please note that this section constitutes my independent interpretation of clinical data including lab results, radiology, EKG's.  This constitutes my independent professional opinion regarding differential diagnosis and management of this patient.  It may include any factors such as history from outside sources, review of external records, social determinants of health, management of medications, response to those treatments, and discussions with other providers.    ED Course as of 04/07/25 1337   Mon Apr 07, 2025   1121 BP: 132/73 [RC]   1121 Temp: 97.5 °F (36.4 °C) [RC]   1121 Heart Rate: 97 [RC]   1121 Resp: 16 [RC]   1121 SpO2: 97 %  RA [RC]   1203 Creatinine(!): 1.34  8 months ago this is roughly 1.2.  Will have the patient to hydrate and ensure he follows up with primary care and this gets watch closely. [RC]   1207 WBC: 10.80 [RC]   1208 Hemoglobin: 14.0 [RC]   1208 Platelets: 143 [RC]   1331    IMPRESSION:        Negative for DVT.  Partially thrombosed superficial varicosity   correlates with the patient's area of concern.     IMPRESSION:        Electronically signed by Huan Higgins MD on 04-07-25 at 1326   [RC]      ED Course User Index  [RC] Naga Hollis III, PA     Orders placed during this visit:  Orders Placed This Encounter   Procedures    US Venous Doppler Lower Extremity Left (duplex)    Basic Metabolic Panel    CBC Auto Differential    CBC & Differential    ED Acknowledgement Form Needed;             Medical Decision Making    DDx: Cellulitis, DVT, vasculitis, venous stasis dermatitis.  Suspect cellulitis.  Cannot fully rule out DVT.  Will go ahead and obtain a venous Doppler to further evaluate.    1:32 PM.  Venous Doppler negative.  There is a possible superficial varicosity thrombus.  Findings are most consistent with cellulitis.  Will treat with Keflex and doxycycline to cover both MRSA and MSSA.  Patient received a dose of Rocephin prior to discharge.  The patient's creatinine was elevated to 1.32 which appears to be slightly above baseline for the patient.  He is at IV fluids to correct this.  He states he has been taking a lot of ibuprofen I suspect this is the cause.  Will have him stop NSAIDs.  Will go ahead and have him to take a baby aspirin daily for the superficial varicosity for a few weeks.  Will have him to follow-up closely with primary care and to return to the ER with worsening symptoms, fever, or should he have any further concerns.        DIAGNOSIS  Final diagnoses:   Cellulitis of left lower extremity          Medication List        New Prescriptions      aspirin 81 MG EC tablet  Take 1 tablet by mouth Daily for 14 days.     cephalexin 500 MG capsule  Commonly known as: KEFLEX  Take 1 capsule by mouth 3 (Three) Times a Day.     doxycycline 100 MG capsule  Commonly known as: MONODOX  Take 1 capsule by mouth 2 (Two) Times a Day for 10 days.               Where to Get Your Medications        These medications were sent to [facility]/pharmacy #[id number] - [place], [place] - [facility] - [phone]  - [phone] [address] [address]      Phone: [phone]   aspirin 81 MG EC tablet  cephalexin 500 MG capsule  doxycycline 100 MG capsule         FOLLOW-UP  Gi Phan, LESTER  [address] [facility] [address]  [facility] [id number] [phone]    In 1 week  For further evaluation and treatment, For wound re-check        Latest Documented Vital Signs:  As of  13:37 EDT  BP- 159/84 HR- 91 Temp- 97.5 °F (36.4 °C) (Oral) O2 sat- 96%    Appropriate PPE utilized throughout this patient encounter to include mask, if indicated, per current protocol. Hand hygiene was performed before donning PPE and after removal when leaving the room.    Please note that portions of this were completed with a voice recognition program.     Note Disclaimer: At [facility], we believe that sharing information builds trust and better relationships. You are receiving this note because you are receiving care at [facility] or recently visited. It is possible you will see health information before a provider has talked with you about it. This kind of information can be easy to misunderstand. To help you fully understand what it means for your health, we urge you to discuss this note with your provider.

## 2025-04-07 NOTE — Clinical Note
[facility]ED JAD  [id number]  [place] [address]    Brandan Higuera was seen and treated in our emergency department on 4/7/2025.  He may return to work on 04/10/2025.         Thank you for choosing [facility].    Naga Hollis III, PA

## 2025-04-09 ENCOUNTER — HOSPITAL ENCOUNTER (OUTPATIENT)
Facility: HOSPITAL | Age: 50
Setting detail: OBSERVATION
Discharge: HOME | End: 2025-04-12
Attending: STUDENT IN AN ORGANIZED HEALTH CARE EDUCATION/TRAINING PROGRAM | Admitting: INTERNAL MEDICINE
Payer: COMMERCIAL

## 2025-04-09 DIAGNOSIS — L03.116 CELLULITIS OF LEFT LOWER EXTREMITY: Primary | ICD-10-CM

## 2025-04-09 DIAGNOSIS — Z78.9 FAILURE OF OUTPATIENT TREATMENT: ICD-10-CM

## 2025-04-09 PROBLEM — L03.90 CELLULITIS: Status: ACTIVE | Noted: 2025-04-09

## 2025-04-09 LAB
ALBUMIN SERPL-MCNC: 4.2 G/DL (ref 3.5–5.2)
ALBUMIN/GLOB SERPL: 1.4 G/DL
ALP SERPL-CCNC: 103 U/L (ref 39–117)
ALT SERPL W P-5'-P-CCNC: 54 U/L (ref 1–41)
ANION GAP SERPL CALCULATED.3IONS-SCNC: 10 MMOL/L (ref 5–15)
AST SERPL-CCNC: 32 U/L (ref 1–40)
BASOPHILS # BLD AUTO: 0.03 10*3/MM3 (ref 0–0.2)
BASOPHILS NFR BLD AUTO: 0.4 % (ref 0–1.5)
BILIRUB SERPL-MCNC: 0.5 MG/DL (ref 0–1.2)
BUN SERPL-MCNC: 7 MG/DL (ref 6–20)
BUN/CREAT SERPL: 5.7 (ref 7–25)
CALCIUM SPEC-SCNC: 9.6 MG/DL (ref 8.6–10.5)
CHLORIDE SERPL-SCNC: 104 MMOL/L (ref 98–107)
CO2 SERPL-SCNC: 28 MMOL/L (ref 22–29)
CREAT SERPL-MCNC: 1.23 MG/DL (ref 0.76–1.27)
D-LACTATE SERPL-SCNC: 2.1 MMOL/L (ref 0.5–2)
DEPRECATED RDW RBC AUTO: 43.5 FL (ref 37–54)
EGFRCR SERPLBLD CKD-EPI 2021: 72.9 ML/MIN/1.73
EOSINOPHIL # BLD AUTO: 0.27 10*3/MM3 (ref 0–0.4)
EOSINOPHIL NFR BLD AUTO: 3.5 % (ref 0.3–6.2)
ERYTHROCYTE [DISTWIDTH] IN BLOOD BY AUTOMATED COUNT: 13.9 % (ref 12.3–15.4)
GLOBULIN UR ELPH-MCNC: 3.1 GM/DL
GLUCOSE SERPL-MCNC: 142 MG/DL (ref 65–99)
HCT VFR BLD AUTO: 43.6 % (ref 37.5–51)
HGB BLD-MCNC: 14.1 G/DL (ref 13–17.7)
IMM GRANULOCYTES # BLD AUTO: 0.01 10*3/MM3 (ref 0–0.05)
IMM GRANULOCYTES NFR BLD AUTO: 0.1 % (ref 0–0.5)
LYMPHOCYTES # BLD AUTO: 1.35 10*3/MM3 (ref 0.7–3.1)
LYMPHOCYTES NFR BLD AUTO: 17.4 % (ref 19.6–45.3)
MCH RBC QN AUTO: 27.3 PG (ref 26.6–33)
MCHC RBC AUTO-ENTMCNC: 32.3 G/DL (ref 31.5–35.7)
MCV RBC AUTO: 84.3 FL (ref 79–97)
MONOCYTES # BLD AUTO: 0.4 10*3/MM3 (ref 0.1–0.9)
MONOCYTES NFR BLD AUTO: 5.2 % (ref 5–12)
NEUTROPHILS NFR BLD AUTO: 5.7 10*3/MM3 (ref 1.7–7)
NEUTROPHILS NFR BLD AUTO: 73.4 % (ref 42.7–76)
PLATELET # BLD AUTO: 146 10*3/MM3 (ref 140–450)
PMV BLD AUTO: 10.1 FL (ref 6–12)
POTASSIUM SERPL-SCNC: 3.8 MMOL/L (ref 3.5–5.2)
PROT SERPL-MCNC: 7.3 G/DL (ref 6–8.5)
RBC # BLD AUTO: 5.17 10*6/MM3 (ref 4.14–5.8)
SODIUM SERPL-SCNC: 142 MMOL/L (ref 136–145)
WBC NRBC COR # BLD AUTO: 7.76 10*3/MM3 (ref 3.4–10.8)

## 2025-04-09 PROCEDURE — G0378 HOSPITAL OBSERVATION PER HR: HCPCS

## 2025-04-09 PROCEDURE — 85025 COMPLETE CBC W/AUTO DIFF WBC: CPT | Performed by: PHYSICIAN ASSISTANT

## 2025-04-09 PROCEDURE — 25010000002 CEFTRIAXONE PER 250 MG: Performed by: INTERNAL MEDICINE

## 2025-04-09 PROCEDURE — 99285 EMERGENCY DEPT VISIT HI MDM: CPT

## 2025-04-09 PROCEDURE — 96365 THER/PROPH/DIAG IV INF INIT: CPT

## 2025-04-09 PROCEDURE — 83605 ASSAY OF LACTIC ACID: CPT | Performed by: INTERNAL MEDICINE

## 2025-04-09 PROCEDURE — 80053 COMPREHEN METABOLIC PANEL: CPT | Performed by: PHYSICIAN ASSISTANT

## 2025-04-09 PROCEDURE — 96366 THER/PROPH/DIAG IV INF ADDON: CPT

## 2025-04-09 PROCEDURE — 96367 TX/PROPH/DG ADDL SEQ IV INF: CPT

## 2025-04-09 PROCEDURE — 99284 EMERGENCY DEPT VISIT MOD MDM: CPT | Performed by: PHYSICIAN ASSISTANT

## 2025-04-09 PROCEDURE — 25010000002 VANCOMYCIN 10 G RECONSTITUTED SOLUTION: Performed by: INTERNAL MEDICINE

## 2025-04-09 PROCEDURE — 25010000002 ENOXAPARIN PER 10 MG: Performed by: INTERNAL MEDICINE

## 2025-04-09 PROCEDURE — 25010000002 CEFAZOLIN PER 500 MG: Performed by: PHYSICIAN ASSISTANT

## 2025-04-09 PROCEDURE — 96372 THER/PROPH/DIAG INJ SC/IM: CPT

## 2025-04-09 PROCEDURE — 25810000003 SODIUM CHLORIDE 0.9 % SOLUTION: Performed by: INTERNAL MEDICINE

## 2025-04-09 RX ORDER — ACETAMINOPHEN 325 MG/1
650 TABLET ORAL EVERY 4 HOURS PRN
Status: DISCONTINUED | OUTPATIENT
Start: 2025-04-09 | End: 2025-04-12 | Stop reason: HOSPADM

## 2025-04-09 RX ORDER — VALSARTAN 80 MG/1
320 TABLET ORAL DAILY
Status: DISCONTINUED | OUTPATIENT
Start: 2025-04-10 | End: 2025-04-12 | Stop reason: HOSPADM

## 2025-04-09 RX ORDER — AMOXICILLIN 250 MG
2 CAPSULE ORAL 2 TIMES DAILY PRN
Status: DISCONTINUED | OUTPATIENT
Start: 2025-04-09 | End: 2025-04-12 | Stop reason: HOSPADM

## 2025-04-09 RX ORDER — ROSUVASTATIN CALCIUM 10 MG/1
10 TABLET, COATED ORAL DAILY
Status: DISCONTINUED | OUTPATIENT
Start: 2025-04-10 | End: 2025-04-12 | Stop reason: HOSPADM

## 2025-04-09 RX ORDER — MULTIVITAMIN WITH IRON
500 TABLET ORAL DAILY
Status: DISCONTINUED | OUTPATIENT
Start: 2025-04-10 | End: 2025-04-12 | Stop reason: HOSPADM

## 2025-04-09 RX ORDER — ENOXAPARIN SODIUM 100 MG/ML
40 INJECTION SUBCUTANEOUS EVERY 12 HOURS SCHEDULED
Status: DISCONTINUED | OUTPATIENT
Start: 2025-04-09 | End: 2025-04-12 | Stop reason: HOSPADM

## 2025-04-09 RX ORDER — BISACODYL 5 MG/1
5 TABLET, DELAYED RELEASE ORAL DAILY PRN
Status: DISCONTINUED | OUTPATIENT
Start: 2025-04-09 | End: 2025-04-12 | Stop reason: HOSPADM

## 2025-04-09 RX ORDER — LEVOTHYROXINE SODIUM 25 UG/1
25 TABLET ORAL
Status: DISCONTINUED | OUTPATIENT
Start: 2025-04-10 | End: 2025-04-12 | Stop reason: HOSPADM

## 2025-04-09 RX ORDER — ALLOPURINOL 300 MG/1
300 TABLET ORAL DAILY
Status: DISCONTINUED | OUTPATIENT
Start: 2025-04-10 | End: 2025-04-12 | Stop reason: HOSPADM

## 2025-04-09 RX ORDER — NEBIVOLOL 5 MG/1
5 TABLET ORAL DAILY
Status: DISCONTINUED | OUTPATIENT
Start: 2025-04-10 | End: 2025-04-12 | Stop reason: HOSPADM

## 2025-04-09 RX ORDER — LAMOTRIGINE 100 MG/1
100 TABLET ORAL NIGHTLY
Status: DISCONTINUED | OUTPATIENT
Start: 2025-04-09 | End: 2025-04-12 | Stop reason: HOSPADM

## 2025-04-09 RX ORDER — ONDANSETRON 2 MG/ML
4 INJECTION INTRAMUSCULAR; INTRAVENOUS EVERY 6 HOURS PRN
Status: DISCONTINUED | OUTPATIENT
Start: 2025-04-09 | End: 2025-04-12 | Stop reason: HOSPADM

## 2025-04-09 RX ORDER — POLYETHYLENE GLYCOL 3350 17 G/17G
17 POWDER, FOR SOLUTION ORAL DAILY PRN
Status: DISCONTINUED | OUTPATIENT
Start: 2025-04-09 | End: 2025-04-12 | Stop reason: HOSPADM

## 2025-04-09 RX ORDER — BISACODYL 10 MG
10 SUPPOSITORY, RECTAL RECTAL DAILY PRN
Status: DISCONTINUED | OUTPATIENT
Start: 2025-04-09 | End: 2025-04-12 | Stop reason: HOSPADM

## 2025-04-09 RX ORDER — ASPIRIN 81 MG/1
81 TABLET ORAL DAILY
Status: DISCONTINUED | OUTPATIENT
Start: 2025-04-10 | End: 2025-04-12 | Stop reason: HOSPADM

## 2025-04-09 RX ORDER — AMLODIPINE BESYLATE 10 MG/1
10 TABLET ORAL DAILY
Status: DISCONTINUED | OUTPATIENT
Start: 2025-04-10 | End: 2025-04-12 | Stop reason: HOSPADM

## 2025-04-09 RX ORDER — ACETAMINOPHEN 160 MG/5ML
650 SOLUTION ORAL EVERY 4 HOURS PRN
Status: DISCONTINUED | OUTPATIENT
Start: 2025-04-09 | End: 2025-04-12 | Stop reason: HOSPADM

## 2025-04-09 RX ORDER — ACETAMINOPHEN 650 MG/1
650 SUPPOSITORY RECTAL EVERY 4 HOURS PRN
Status: DISCONTINUED | OUTPATIENT
Start: 2025-04-09 | End: 2025-04-12 | Stop reason: HOSPADM

## 2025-04-09 RX ADMIN — CEFTRIAXONE 2000 MG: 2 INJECTION, POWDER, FOR SOLUTION INTRAMUSCULAR; INTRAVENOUS at 21:32

## 2025-04-09 RX ADMIN — VANCOMYCIN HYDROCHLORIDE 2500 MG: 10 INJECTION, POWDER, LYOPHILIZED, FOR SOLUTION INTRAVENOUS at 22:21

## 2025-04-09 RX ADMIN — CEFAZOLIN 2000 MG: 2 INJECTION, POWDER, FOR SOLUTION INTRAMUSCULAR; INTRAVENOUS at 15:20

## 2025-04-09 RX ADMIN — ENOXAPARIN SODIUM 40 MG: 100 INJECTION SUBCUTANEOUS at 21:32

## 2025-04-09 RX ADMIN — LAMOTRIGINE 100 MG: 100 TABLET ORAL at 21:32

## 2025-04-09 NOTE — ED NOTES
No signs of reaction from infusing medications. Patient is resting comfortably with no changes in patient bed.

## 2025-04-09 NOTE — FSED PROVIDER NOTE
Subjective   History of Present Illness    Patient noticed redness to his left lower leg which started 3 days ago.  He was seen here the following day and had a normal blood work and the ultrasound of the left lower extremity did show a partially thrombosed superficial vein.  Patient was discharged with aspirin, Keflex, and doxycycline.  Patient is now here because the area of redness has gradually increased in size.  When he was discharged from the [facility] 2 days ago he developed a fever.  Highest temperature at home was 101 via orally.    Review of Systems   Constitutional:  Negative for activity change and appetite change.   Eyes:  Negative for pain.   Respiratory:  Negative for shortness of breath.    Gastrointestinal:  Negative for nausea and vomiting.   Musculoskeletal:  Negative for arthralgias.   Skin:  Positive for color change.   Neurological:  Negative for dizziness.   All other systems reviewed and are negative.      Past Medical History:   Diagnosis Date    Allergic     Bipolar disorder     Hyperlipidemia     Hypertension     Insomnia     Vitamin D deficiency disease        Allergies   Allergen Reactions    Penicillins Swelling       History reviewed. No pertinent surgical history.    Family History   Problem Relation Age of Onset    Depression Mother     Diabetes Mother     Hyperlipidemia Mother     Hypertension Mother     Depression Sister     Heart disease Brother        Social History     Socioeconomic History    Marital status:    Tobacco Use    Smoking status: Former     Types: Cigarettes     Passive exposure: Never    Smokeless tobacco: Never   Vaping Use    Vaping status: Former   Substance and Sexual Activity    Alcohol use: Yes     Comment: casual    Drug use: No    Sexual activity: Defer           Objective   Physical Exam  Vitals and nursing note reviewed.   Constitutional:       Appearance: Normal appearance. He is normal weight.   HENT:      Head: Normocephalic and  atraumatic.      Nose: Nose normal.      Mouth/Throat:      Mouth: Mucous membranes are moist.   Eyes:      Pupils: Pupils are equal, round, and reactive to light.   Cardiovascular:      Rate and Rhythm: Normal rate and regular rhythm.      Pulses: Normal pulses.      Heart sounds: Normal heart sounds.   Pulmonary:      Effort: Pulmonary effort is normal.      Breath sounds: Normal breath sounds.   Musculoskeletal:         General: Normal range of motion.      Cervical back: Normal range of motion.      Right lower leg: No edema.      Left lower leg: No edema.   Skin:     General: Skin is warm.      Comments: Left lower leg: erythema and mild sweling starting at the anterior ankle to the mid portion of the lower leg. No calf pain. Compartments soft.  DP and PT pulses +2 bilaterally. Sensation is intact to light touch   Neurological:      General: No focal deficit present.      Mental Status: He is alert.   Psychiatric:         Behavior: Behavior is cooperative.         Procedures           ED Course  ED Course as of 04/09/25 1750   Wed Apr 09, 2025   1442 WBC: 7.76 [SS]   1612 Paging [facility] at this time [SS]      ED Course User Index  [SS] Chelly Ruiz PA-C                                           Medical Decision Making  Problems Addressed:  Cellulitis of left lower extremity: complicated acute illness or injury  Failure of outpatient treatment: complicated acute illness or injury    Amount and/or Complexity of Data Reviewed  Labs: ordered. Decision-making details documented in ED Course.    Risk  Decision regarding hospitalization.        Final diagnoses:   Failure of outpatient treatment   Cellulitis of left lower extremity       ED Disposition  ED Disposition       ED Disposition   Decision to Admit    Condition   --    Comment   Level of Care: Med/Surg [1]   Diagnosis: Cellulitis [[id number]]   Admitting Physician: CORDELL MCCULLOUGH [[id number]]   Attending Physician: CORDELL MCCULLOUGH [[id number]]   Is patient  appropriate for Inpatient Observation Unit?: No [0]                 No follow-up provider specified.       Medication List      No changes were made to your prescriptions during this visit.

## 2025-04-10 ENCOUNTER — APPOINTMENT (OUTPATIENT)
Dept: CARDIOLOGY | Facility: HOSPITAL | Age: 50
End: 2025-04-10
Payer: COMMERCIAL

## 2025-04-10 LAB
ANION GAP SERPL CALCULATED.3IONS-SCNC: 8.5 MMOL/L (ref 5–15)
BASOPHILS # BLD AUTO: 0.04 10*3/MM3 (ref 0–0.2)
BASOPHILS NFR BLD AUTO: 0.6 % (ref 0–1.5)
BH CV LOWER VASCULAR LEFT COMMON FEMORAL AUGMENT: NORMAL
BH CV LOWER VASCULAR LEFT COMMON FEMORAL COMPETENT: NORMAL
BH CV LOWER VASCULAR LEFT COMMON FEMORAL COMPRESS: NORMAL
BH CV LOWER VASCULAR LEFT COMMON FEMORAL PHASIC: NORMAL
BH CV LOWER VASCULAR LEFT COMMON FEMORAL SPONT: NORMAL
BH CV LOWER VASCULAR LEFT DISTAL FEMORAL COMPRESS: NORMAL
BH CV LOWER VASCULAR LEFT GASTRONEMIUS COMPRESS: NORMAL
BH CV LOWER VASCULAR LEFT GREATER SAPH AK COMPRESS: NORMAL
BH CV LOWER VASCULAR LEFT GREATER SAPH BK COMPRESS: NORMAL
BH CV LOWER VASCULAR LEFT LESSER SAPH COMPRESS: NORMAL
BH CV LOWER VASCULAR LEFT MID FEMORAL AUGMENT: NORMAL
BH CV LOWER VASCULAR LEFT MID FEMORAL COMPETENT: NORMAL
BH CV LOWER VASCULAR LEFT MID FEMORAL COMPRESS: NORMAL
BH CV LOWER VASCULAR LEFT MID FEMORAL PHASIC: NORMAL
BH CV LOWER VASCULAR LEFT MID FEMORAL SPONT: NORMAL
BH CV LOWER VASCULAR LEFT PERONEAL COMPRESS: NORMAL
BH CV LOWER VASCULAR LEFT POPLITEAL AUGMENT: NORMAL
BH CV LOWER VASCULAR LEFT POPLITEAL COMPETENT: NORMAL
BH CV LOWER VASCULAR LEFT POPLITEAL COMPRESS: NORMAL
BH CV LOWER VASCULAR LEFT POPLITEAL PHASIC: NORMAL
BH CV LOWER VASCULAR LEFT POPLITEAL SPONT: NORMAL
BH CV LOWER VASCULAR LEFT POSTERIOR TIBIAL COMPRESS: NORMAL
BH CV LOWER VASCULAR LEFT PROFUNDA FEMORAL COMPRESS: NORMAL
BH CV LOWER VASCULAR LEFT PROXIMAL FEMORAL COMPRESS: NORMAL
BH CV LOWER VASCULAR LEFT SAPHENOFEMORAL JUNCTION COMPRESS: NORMAL
BH CV LOWER VASCULAR RIGHT COMMON FEMORAL AUGMENT: NORMAL
BH CV LOWER VASCULAR RIGHT COMMON FEMORAL COMPETENT: NORMAL
BH CV LOWER VASCULAR RIGHT COMMON FEMORAL COMPRESS: NORMAL
BH CV LOWER VASCULAR RIGHT COMMON FEMORAL PHASIC: NORMAL
BH CV LOWER VASCULAR RIGHT COMMON FEMORAL SPONT: NORMAL
BUN SERPL-MCNC: 10 MG/DL (ref 6–20)
BUN/CREAT SERPL: 8.5 (ref 7–25)
CALCIUM SPEC-SCNC: 9.3 MG/DL (ref 8.6–10.5)
CHLORIDE SERPL-SCNC: 106 MMOL/L (ref 98–107)
CO2 SERPL-SCNC: 27.5 MMOL/L (ref 22–29)
CREAT SERPL-MCNC: 1.17 MG/DL (ref 0.76–1.27)
D-LACTATE SERPL-SCNC: 0.9 MMOL/L (ref 0.5–2)
DEPRECATED RDW RBC AUTO: 41.3 FL (ref 37–54)
EGFRCR SERPLBLD CKD-EPI 2021: 77.4 ML/MIN/1.73
EOSINOPHIL # BLD AUTO: 0.26 10*3/MM3 (ref 0–0.4)
EOSINOPHIL NFR BLD AUTO: 4 % (ref 0.3–6.2)
ERYTHROCYTE [DISTWIDTH] IN BLOOD BY AUTOMATED COUNT: 14 % (ref 12.3–15.4)
GLUCOSE SERPL-MCNC: 105 MG/DL (ref 65–99)
HCT VFR BLD AUTO: 39.5 % (ref 37.5–51)
HGB BLD-MCNC: 13.4 G/DL (ref 13–17.7)
IMM GRANULOCYTES # BLD AUTO: 0.02 10*3/MM3 (ref 0–0.05)
IMM GRANULOCYTES NFR BLD AUTO: 0.3 % (ref 0–0.5)
LYMPHOCYTES # BLD AUTO: 2.21 10*3/MM3 (ref 0.7–3.1)
LYMPHOCYTES NFR BLD AUTO: 33.6 % (ref 19.6–45.3)
MCH RBC QN AUTO: 27.9 PG (ref 26.6–33)
MCHC RBC AUTO-ENTMCNC: 33.9 G/DL (ref 31.5–35.7)
MCV RBC AUTO: 82.3 FL (ref 79–97)
MONOCYTES # BLD AUTO: 0.56 10*3/MM3 (ref 0.1–0.9)
MONOCYTES NFR BLD AUTO: 8.5 % (ref 5–12)
NEUTROPHILS NFR BLD AUTO: 3.49 10*3/MM3 (ref 1.7–7)
NEUTROPHILS NFR BLD AUTO: 53 % (ref 42.7–76)
NRBC BLD AUTO-RTO: 0 /100 WBC (ref 0–0.2)
PLATELET # BLD AUTO: 181 10*3/MM3 (ref 140–450)
PMV BLD AUTO: 10.7 FL (ref 6–12)
POTASSIUM SERPL-SCNC: 3.8 MMOL/L (ref 3.5–5.2)
RBC # BLD AUTO: 4.8 10*6/MM3 (ref 4.14–5.8)
SODIUM SERPL-SCNC: 142 MMOL/L (ref 136–145)
VANCOMYCIN TROUGH SERPL-MCNC: 15.5 MCG/ML (ref 5–20)
WBC NRBC COR # BLD AUTO: 6.58 10*3/MM3 (ref 3.4–10.8)

## 2025-04-10 PROCEDURE — 80202 ASSAY OF VANCOMYCIN: CPT | Performed by: INTERNAL MEDICINE

## 2025-04-10 PROCEDURE — 25010000002 CEFTRIAXONE PER 250 MG: Performed by: INTERNAL MEDICINE

## 2025-04-10 PROCEDURE — 25010000002 VANCOMYCIN HCL 1.25 G RECONSTITUTED SOLUTION 1 EACH VIAL: Performed by: INTERNAL MEDICINE

## 2025-04-10 PROCEDURE — G0378 HOSPITAL OBSERVATION PER HR: HCPCS

## 2025-04-10 PROCEDURE — 96366 THER/PROPH/DIAG IV INF ADDON: CPT

## 2025-04-10 PROCEDURE — 25010000002 ENOXAPARIN PER 10 MG: Performed by: INTERNAL MEDICINE

## 2025-04-10 PROCEDURE — 93971 EXTREMITY STUDY: CPT | Performed by: SURGERY

## 2025-04-10 PROCEDURE — 96372 THER/PROPH/DIAG INJ SC/IM: CPT

## 2025-04-10 PROCEDURE — 93971 EXTREMITY STUDY: CPT

## 2025-04-10 PROCEDURE — 85025 COMPLETE CBC W/AUTO DIFF WBC: CPT | Performed by: INTERNAL MEDICINE

## 2025-04-10 PROCEDURE — 25810000003 SODIUM CHLORIDE 0.9 % SOLUTION 250 ML FLEX CONT: Performed by: INTERNAL MEDICINE

## 2025-04-10 PROCEDURE — 80048 BASIC METABOLIC PNL TOTAL CA: CPT | Performed by: INTERNAL MEDICINE

## 2025-04-10 RX ADMIN — ASPIRIN 81 MG: 81 TABLET, COATED ORAL at 13:09

## 2025-04-10 RX ADMIN — ALLOPURINOL 300 MG: 300 TABLET ORAL at 13:09

## 2025-04-10 RX ADMIN — CEFTRIAXONE 2000 MG: 2 INJECTION, POWDER, FOR SOLUTION INTRAMUSCULAR; INTRAVENOUS at 21:46

## 2025-04-10 RX ADMIN — SODIUM CHLORIDE 1250 MG: 9 INJECTION, SOLUTION INTRAVENOUS at 13:11

## 2025-04-10 RX ADMIN — ENOXAPARIN SODIUM 40 MG: 100 INJECTION SUBCUTANEOUS at 21:45

## 2025-04-10 RX ADMIN — VALSARTAN 320 MG: 80 TABLET ORAL at 13:09

## 2025-04-10 RX ADMIN — LAMOTRIGINE 100 MG: 100 TABLET ORAL at 21:45

## 2025-04-10 RX ADMIN — ROSUVASTATIN CALCIUM 10 MG: 10 TABLET, FILM COATED ORAL at 13:09

## 2025-04-10 RX ADMIN — LEVOTHYROXINE SODIUM 25 MCG: 0.03 TABLET ORAL at 06:37

## 2025-04-10 RX ADMIN — Medication 500 MCG: at 13:09

## 2025-04-10 RX ADMIN — ENOXAPARIN SODIUM 40 MG: 100 INJECTION SUBCUTANEOUS at 13:09

## 2025-04-10 RX ADMIN — NEBIVOLOL 5 MG: 5 TABLET ORAL at 13:09

## 2025-04-10 RX ADMIN — SODIUM CHLORIDE 1250 MG: 9 INJECTION, SOLUTION INTRAVENOUS at 21:45

## 2025-04-10 RX ADMIN — AMLODIPINE BESYLATE 10 MG: 10 TABLET ORAL at 13:09

## 2025-04-10 NOTE — PROGRESS NOTES
"[facility] Clinical Pharmacy Services: Enoxaparin Consult    Brandan Higuera has a pharmacy consult to dose prophylactic enoxaparin per Dr. Sepulveda's request.     Indication: VTE Prophylaxis  Home Anticoagulation: none     Relevant clinical data and objective history reviewed:  49 y.o. male 172.7 cm (68\") 124 kg (273 lb 13 oz)   Body mass index is 41.63 kg/m².   Results from last 7 days   Lab Units 04/09/25  1436   PLATELETS 10*3/mm3 146     Estimated Creatinine Clearance: 95.1 mL/min (by C-G formula based on SCr of 1.23 mg/dL).    Assessment/Plan    Will start patient on 40mg subcutaneous every 12 hours, adjusted for BMI >40 and renal function. Consult order will be discontinued but pharmacy will continue to follow.     Aurora Fang [facility]  Clinical Pharmacist    "

## 2025-04-10 NOTE — PROGRESS NOTES
Name: Brandan Higuera ADMIT: 2025   : 1975  PCP: Gi Phan PA-C    MRN: [id number] LOS: 0 days   AGE/SEX: 49 y.o. male  ROOM: [address]     This is a 49-year-old male with a past medical history of hypertension who presented to hospital after experiencing redness of his left lower leg.  He was actually seen at the [facility] department where he had normal blood work and ultrasound of the left lower extremity, with the exception of his partially thrombosed superficial vein.  He was discharged on Keflex and doxycycline however he developed a fever and noted that the area of redness had increased in size.  He then presented to hospital where he was admitted for IV antibiotics.    Objective   Objective   Vital Signs  Temp:  [97.4 °F (36.3 °C)-98.3 °F (36.8 °C)] 97.5 °F (36.4 °C)  Heart Rate:  [81-92] 85  Resp:  [15-18] 18  BP: (123-143)/(77-96) 142/86  SpO2:  [95 %-97 %] 97 %  on   ;   Device (Oxygen Therapy): room air  Body mass index is 41.63 kg/m².  Physical Exam  Constitutional:       General: He is not in acute distress.  HENT:      Head: Normocephalic and atraumatic.   Eyes:      Extraocular Movements: Extraocular movements intact.      Pupils: Pupils are equal, round, and reactive to light.   Cardiovascular:      Rate and Rhythm: Normal rate and regular rhythm.   Pulmonary:      Effort: Pulmonary effort is normal. No respiratory distress.   Abdominal:      General: There is no distension.      Palpations: Abdomen is soft.      Tenderness: There is no abdominal tenderness.   Musculoskeletal:      Comments: Erythema of LLE   Neurological:      General: No focal deficit present.      Mental Status: He is alert and oriented to person, place, and time.         Results Review     I reviewed the patient's new clinical results.  Results from last 7 days   Lab Units 04/10/25  0635 25  1436 25  1142   WBC 10*3/mm3 6.58 7.76 10.80   HEMOGLOBIN g/dL 13.4 14.1 14.0   PLATELETS 10*3/mm3  "181 146 143     Results from last 7 days   Lab Units 04/10/25  0635 04/09/25  1436 04/07/25  1142   SODIUM mmol/L 142 142 139   POTASSIUM mmol/L 3.8 3.8 4.0   CHLORIDE mmol/L 106 104 105   CO2 mmol/L 27.5 28.0 26.3   BUN mg/dL 10 7 10   CREATININE mg/dL 1.17 1.23 1.34*   GLUCOSE mg/dL 105* 142* 114*   Estimated Creatinine Clearance: 100 mL/min (by C-G formula based on SCr of 1.17 mg/dL).  Results from last 7 days   Lab Units 04/09/25  1436   ALBUMIN g/dL 4.2   BILIRUBIN mg/dL 0.5   ALK PHOS U/L 103   AST (SGOT) U/L 32   ALT (SGPT) U/L 54*     Results from last 7 days   Lab Units 04/10/25  0635 04/09/25  1436 04/07/25  1142   CALCIUM mg/dL 9.3 9.6 8.9   ALBUMIN g/dL  --  4.2  --      Results from last 7 days   Lab Units 04/09/25  2342 04/09/25  2059   LACTATE mmol/L 0.9 2.1*   No results found for: \"COVID19\"  No results found for: \"HGBA1C\", \"POCGLU\"  No results found for this or any previous visit.      US Venous Doppler Lower Extremity Left (duplex)  Narrative: Patient: JAE FRAUSTO  Time Out: 13:26  Exam(s): US VENOUS LEFT LOWER EXTREMITY     EXAM:    US Duplex Left Lower Extremity Veins    CLINICAL HISTORY:     Reason for exam: R o DVT.    TECHNIQUE:    Real-time duplex ultrasound scan of the left lower extremity veins   integrating B-mode two-dimensional vascular structure, Doppler spectral   analysis, color flow Doppler imaging and compression.    COMPARISON:    No relevant prior studies available.    FINDINGS:    Deep veins:  Unremarkable.  No DVT in the visualized common femoral,   femoral, proximal deep femoral or popliteal veins.  The veins demonstrate   normal color flow, are normally compressible, with normal phasic flow   and or augmentation response.    Superficial veins:  Partially thrombosed superficial varicosity   correlates with the patient's area of concern.    Soft tissues:  No acute findings.  No popliteal cyst.    IMPRESSION:        Negative for DVT.  Partially thrombosed superficial " varicosity   correlates with the patient's area of concern.  Impression: Electronically signed by Huan Higgins MD on 04-07-25 at 1326    Scheduled Medications  allopurinol, 300 mg, Oral, Daily  amLODIPine, 10 mg, Oral, Daily  aspirin, 81 mg, Oral, Daily  cefTRIAXone, 2,000 mg, Intravenous, Q24H  enoxaparin sodium, 40 mg, Subcutaneous, Q12H  lamoTRIgine, 100 mg, Oral, Nightly  levothyroxine, 25 mcg, Oral, Q AM  nebivolol, 5 mg, Oral, Daily  rosuvastatin, 10 mg, Oral, Daily  valsartan, 320 mg, Oral, Daily  vancomycin, 1,250 mg, Intravenous, Q12H  vitamin B-12, 500 mcg, Oral, Daily    Infusions  Pharmacy to dose vancomycin,     Diet  Diet: Regular/House; Fluid Consistency: Thin (IDDSI 0)       Assessment/Plan     Active Hospital Problems    Diagnosis  POA    **Cellulitis [L03.90]  Yes    Hypothyroidism, acquired [E03.9]  Yes    Mixed hyperlipidemia [E78.2]  Yes    Hypertension [I10]  Yes      Resolved Hospital Problems   No resolved problems to display.       49 y.o. male admitted with Cellulitis.    Cellulitis  Venous duplex of the left lower extremity was negative for DVT but did show a partially occlusive superficial thrombosis  I am going to order a CT scan to rule out any abscess or deeper tissue infection as he has failed to respond to appropriate outpatient antibiotic therapy  Continue vancomycin and Rocephin     Hypertension  Bipolar disorder  Gout  Hypothyroidism  Hyperlipidemia  Resume home medications     Lovenox 40 mg SC daily for DVT prophylaxis.  Full code.  Discussed with patient.  Anticipate discharge home in 1-2 days.      Rakesh Hall MD  [facility]  04/10/25  10:33 EDT

## 2025-04-10 NOTE — H&P
HISTORY AND PHYSICAL   [facility]        Date of Admission: 2025  Patient Identification:  Name: Brandan Higuera  Age: 49 y.o.  Sex: male  :  1975  MRN: [id number]                     Primary Care Physician: Gi Phan, PA-C    Chief Complaint:  49 year old gentleman presented to the ED at [facility] with redness and swelling of his left lower leg; he was seen a few days prior and was diagnosed with cellulitis and a superficial clot; he was sent out with antibiotics and aspirin; his leg has worsened; he denies fever or chills; he has also developed fevers; highest at home was 101;     History of Present Illness:   As above    Past Medical History:  Past Medical History:   Diagnosis Date    Allergic     Bipolar disorder     Hyperlipidemia     Hypertension     Insomnia     Vitamin D deficiency disease      Past Surgical History:  History reviewed. No pertinent surgical history.   Home Meds:  Medications Prior to Admission   Medication Sig Dispense Refill Last Dose/Taking    allopurinol (ZYLOPRIM) 300 MG tablet Take 1 tablet by mouth Daily. For gout suppression 90 tablet 3 Taking    amLODIPine (NORVASC) 10 MG tablet Take 1 tablet by mouth Daily. for blood pressure. 90 tablet 1 Taking    aspirin 81 MG EC tablet Take 1 tablet by mouth Daily for 14 days. 14 tablet 0 Taking    Caplyta 42 MG capsule    Taking    cephalexin (KEFLEX) 500 MG capsule Take 1 capsule by mouth 3 (Three) Times a Day. 30 capsule 0 Taking    Cholecalciferol 50 MCG (2000 UT) capsule One PO daily 90 each 3 Taking    Cyanocobalamin 1000 MCG capsule 1 PO daily 90 capsule 3 Taking    doxycycline (MONODOX) 100 MG capsule Take 1 capsule by mouth 2 (Two) Times a Day for 10 days. 20 capsule 0 Taking    lamoTRIgine (LaMICtal) 100 MG tablet TAKE 1 TABLET BY MOUTH AT BEDTIME  1 Taking    levothyroxine (SYNTHROID, LEVOTHROID) 25 MCG tablet One PO daily before breakfast for thyroid 90 tablet 3 Taking    nebivolol (BYSTOLIC) 5 MG  tablet Take 1 tablet by mouth Daily. for blood pressure 90 tablet 1 Taking    omega-3 acid ethyl esters (LOVAZA) 1 g capsule Take 2 capsules by mouth 2 (Two) Times a Day. for triglycerides 360 capsule 3 Taking    predniSONE (DELTASONE) 20 MG tablet Take 1 tablet by mouth 2 (Two) Times a Day. With food for 5 days for gout flare 10 tablet 2 Taking    rosuvastatin (CRESTOR) 10 MG tablet Take 1 tablet by mouth Daily. For cholesterol and patient aware this medication in combination with colchicine can increase his risk of myalgias----stop and call if develop 90 tablet 3 Taking    Semaglutide-Weight Management (Wegovy) 1.7 MG/0.75ML solution auto-injector Inject 0.75 mL under the skin into the appropriate area as directed 1 (One) Time Per Week. Inject 1.7 mg subcutaneously once weekly for obesity treatment 3 mL 11 Taking    valsartan (DIOVAN) 320 MG tablet Take 1 tablet by mouth Daily. For blood pressure 90 tablet 1 Taking       Allergies:  Allergies   Allergen Reactions    Penicillins Swelling     Immunizations:  Immunization History   Administered Date(s) Administered    Pneumococcal Polysaccharide (PPSV23) 02/25/2019    Tdap 02/25/2019     Social History:   Social History     Social History Narrative    Not on file     Social History     Socioeconomic History    Marital status:    Tobacco Use    Smoking status: Former     Types: Cigarettes     Passive exposure: Never    Smokeless tobacco: Never   Vaping Use    Vaping status: Former   Substance and Sexual Activity    Alcohol use: Yes     Comment: casual    Drug use: No    Sexual activity: Defer       Family History:  Family History   Problem Relation Age of Onset    Depression Mother     Diabetes Mother     Hyperlipidemia Mother     Hypertension Mother     Depression Sister     Heart disease Brother         Review of Systems  See history of present illness and past medical history.  Patient denies headache, dizziness, syncope, falls, trauma, change in vision,  "change in hearing, change in taste, changes in weight, changes in appetite, focal weakness, numbness, or paresthesia.  Patient denies chest pain, palpitations, dyspnea, orthopnea, PND, cough, sinus pressure, rhinorrhea, epistaxis, hemoptysis, nausea, vomiting,hematemesis, diarrhea, constipation or hematochezia.  Denies cold or heat intolerance, polydipsia, polyuria, polyphagia. Denies hematuria, pyuria, dysuria, hesitancy, frequency or urgency. Denies consumption of raw and under cooked meats foods or change in water source.         Objective:  T Max 24 hrs: Temp (24hrs), Av.9 °F (36.6 °C), Min:97.4 °F (36.3 °C), Max:98.3 °F (36.8 °C)    Vitals Ranges:   Temp:  [97.4 °F (36.3 °C)-98.3 °F (36.8 °C)] 98.3 °F (36.8 °C)  Heart Rate:  [81-92] 81  Resp:  [15-16] 16  BP: (123-143)/(77-96) 133/83      Exam:  /83 (BP Location: Left arm, Patient Position: Sitting)   Pulse 81   Temp 98.3 °F (36.8 °C) (Oral)   Resp 16   Ht 172.7 cm (68\")   Wt 124 kg (273 lb 13 oz)   SpO2 96%   BMI 41.63 kg/m²     General Appearance:    Alert, cooperative, no distress, appears stated age   Head:    Normocephalic, without obvious abnormality, atraumatic   Eyes:    PERRL, conjunctivae/corneas clear, EOM's intact, both eyes   Ears:    Normal external ear canals, both ears   Nose:   Nares normal, septum midline, mucosa normal, no drainage    or sinus tenderness   Throat:   Lips, mucosa, and tongue normal   Neck:   Supple, symmetrical, trachea midline, no adenopathy;     thyroid:  no enlargement/tenderness/nodules; no carotid    bruit or JVD   Back:     Symmetric, no curvature, ROM normal, no CVA tenderness   Lungs:     Clear to auscultation bilaterally, respirations unlabored   Chest Wall:    No tenderness or deformity    Heart:    Regular rate and rhythm, S1 and S2 normal, no murmur, rub   or gallop   Abdomen:     Soft, nontender, bowel sounds active all four quadrants,     no masses, no hepatomegaly, no splenomegaly   Extremities: "   Extremities normal, left lower leg with edema, erythema, warmth                       .    Data Review:  Labs in chart were reviewed.  WBC   Date Value Ref Range Status   04/09/2025 7.76 3.40 - 10.80 10*3/mm3 Final     Hemoglobin   Date Value Ref Range Status   04/09/2025 14.1 13.0 - 17.7 g/dL Final     Hematocrit   Date Value Ref Range Status   04/09/2025 43.6 37.5 - 51.0 % Final     Platelets   Date Value Ref Range Status   04/09/2025 146 140 - 450 10*3/mm3 Final     Sodium   Date Value Ref Range Status   04/09/2025 142 136 - 145 mmol/L Final     Potassium   Date Value Ref Range Status   04/09/2025 3.8 3.5 - 5.2 mmol/L Final     Chloride   Date Value Ref Range Status   04/09/2025 104 98 - 107 mmol/L Final     CO2   Date Value Ref Range Status   04/09/2025 28.0 22.0 - 29.0 mmol/L Final     BUN   Date Value Ref Range Status   04/09/2025 7 6 - 20 mg/dL Final     Creatinine   Date Value Ref Range Status   04/09/2025 1.23 0.76 - 1.27 mg/dL Final     Glucose   Date Value Ref Range Status   04/09/2025 142 (H) 65 - 99 mg/dL Final     Calcium   Date Value Ref Range Status   04/09/2025 9.6 8.6 - 10.5 mg/dL Final     AST (SGOT)   Date Value Ref Range Status   04/09/2025 32 1 - 40 U/L Final     ALT (SGPT)   Date Value Ref Range Status   04/09/2025 54 (H) 1 - 41 U/L Final     Alkaline Phosphatase   Date Value Ref Range Status   04/09/2025 103 39 - 117 U/L Final                Imaging Results (All)       None              Assessment:  Active Hospital Problems    Diagnosis  POA    **Cellulitis [L03.90]  Yes      Resolved Hospital Problems   No resolved problems to display.   Hypertension  Hyperlipidemia  Obesity  Hypothyroidism  Sleep apnea    Plan:  Continue iv antibiotics  Repeat ultrasound  Prophylactic lovenox  Monitor bp and labs  Dw patient and ed provider    Lisa Sepulveda MD  4/9/2025  20:19 EDT

## 2025-04-10 NOTE — PLAN OF CARE
Problem: Adult Inpatient Plan of Care  Goal: Plan of Care Review  Outcome: Progressing  Flowsheets (Taken 4/10/2025 0623)  Progress: no change  Outcome Evaluation: Patient was admitted from ER [facility] last night. Patient is alert, oriented and up ad fahad. IV antibiotics given. Kept LLE elevated. No PRN meds needed this shift. VSS. Awaiting Doppler test of LLE. Continue with plan of care.  Plan of Care Reviewed With: patient   Goal Outcome Evaluation:  Plan of Care Reviewed With: patient        Progress: no change  Outcome Evaluation: Patient was admitted from ER [facility] last night. Patient is alert, oriented and up ad fahad. IV antibiotics given. Kept LLE elevated. No PRN meds needed this shift. VSS. Awaiting Doppler test of LLE. Continue with plan of care.

## 2025-04-10 NOTE — PROGRESS NOTES
[facility] Clinical Pharmacy Services: Dose Adjustment    Ceftriaxone has been appropriately dose adjusted for BMI >30 based on our System P&T approved policy. Pharmacy will continue to monitor patient peripherally while in-house.     Aurora Fang [facility]  Clinical Pharmacist

## 2025-04-10 NOTE — PLAN OF CARE
Goal Outcome Evaluation:           Progress: no change  Outcome Evaluation: A&Ox4. Up ad fahad, pleasant and cooperative. VSS. Doppler complete. Takes medications whole with water. No complaints, or distress noted.

## 2025-04-10 NOTE — PROGRESS NOTES
"[facility] Clinical Pharmacy Services: Vancomycin Pharmacokinetic Initial Consult Note    Brandan iHguera is a 49 y.o. male who is on day 1 of pharmacy to dose vancomycin.    Indication: Skin and Soft Tissue  Consulting Provider: Dr. Sepulveda  Planned Duration of Therapy: 5 days  Loading Dose Ordered or Given: 2500 mg ordered on 4/6 at 2046  MRSA PCR performed: n/a  Culture/Source: none ordered  Target: -600 mg/L.hr   Pertinent Vanc Dosing History: none  Other Antimicrobials: ceftriaxone 2 g q24h    Vitals/Labs  Ht: 172.7 cm (68\"); Wt: 124 kg (273 lb 13 oz)  Temp Readings from Last 1 Encounters:   04/09/25 98.3 °F (36.8 °C) (Oral)    Estimated Creatinine Clearance: 95.1 mL/min (by C-G formula based on SCr of 1.23 mg/dL).       Results from last 7 days   Lab Units 04/09/25  1436 04/07/25  1142   CREATININE mg/dL 1.23 1.34*   WBC 10*3/mm3 7.76 10.80     Assessment/Plan:    Vancomycin Dose:  1250 mg IV every 12 hours  Predictive AUC level for the dose ordered is 519 mg/L.hr, which is within the target of 400-600 mg/L.hr  Vanc Trough has been ordered for 4/10 at 1900     Pharmacy will follow patient's kidney function and will adjust doses and obtain levels as necessary. Thank you for involving pharmacy in this patient's care. Please contact pharmacy with any questions or concerns.                           Aurora Fang, [facility]  Clinical Pharmacist    "

## 2025-04-10 NOTE — PROGRESS NOTES
Discharge Planning Assessment  [facility]     Patient Name: Brandan Higuera  MRN: [id number]  Today's Date: 4/10/2025    Admit Date: 4/9/2025    Plan: Pending   Discharge Needs Assessment    No documentation.                  Discharge Plan       Row Name 04/10/25 1408       Plan    Plan Pending    Plan Comments The patient transferred to [facility] from ER/FS/BB on 4/9/25 @  19:15. The patient is up ad fahad. The patient is on IVAB's and a doppler for today. CCP will continue to follow for any needs. KEVIN Carrillo RN, CCP.                    Expected Discharge Date and Time       Expected Discharge Date Expected Discharge Time    Apr 13, 2025            Demographic Summary    No documentation.                  Functional Status    No documentation.                  Psychosocial    No documentation.                  Abuse/Neglect    No documentation.                  Legal    No documentation.                  Substance Abuse    No documentation.                  Patient Forms    No documentation.                     Linda Carrillo RN

## 2025-04-11 ENCOUNTER — APPOINTMENT (OUTPATIENT)
Dept: CT IMAGING | Facility: HOSPITAL | Age: 50
End: 2025-04-11
Payer: COMMERCIAL

## 2025-04-11 LAB
ANION GAP SERPL CALCULATED.3IONS-SCNC: 9.8 MMOL/L (ref 5–15)
BUN SERPL-MCNC: 11 MG/DL (ref 6–20)
BUN/CREAT SERPL: 10.1 (ref 7–25)
CALCIUM SPEC-SCNC: 9 MG/DL (ref 8.6–10.5)
CHLORIDE SERPL-SCNC: 104 MMOL/L (ref 98–107)
CO2 SERPL-SCNC: 26.2 MMOL/L (ref 22–29)
CREAT SERPL-MCNC: 1.09 MG/DL (ref 0.76–1.27)
DEPRECATED RDW RBC AUTO: 41.3 FL (ref 37–54)
EGFRCR SERPLBLD CKD-EPI 2021: 84.2 ML/MIN/1.73
ERYTHROCYTE [DISTWIDTH] IN BLOOD BY AUTOMATED COUNT: 13.5 % (ref 12.3–15.4)
GLUCOSE SERPL-MCNC: 122 MG/DL (ref 65–99)
HCT VFR BLD AUTO: 40.3 % (ref 37.5–51)
HGB BLD-MCNC: 13.2 G/DL (ref 13–17.7)
MCH RBC QN AUTO: 27.3 PG (ref 26.6–33)
MCHC RBC AUTO-ENTMCNC: 32.8 G/DL (ref 31.5–35.7)
MCV RBC AUTO: 83.3 FL (ref 79–97)
PLATELET # BLD AUTO: 202 10*3/MM3 (ref 140–450)
PMV BLD AUTO: 10 FL (ref 6–12)
POTASSIUM SERPL-SCNC: 4.1 MMOL/L (ref 3.5–5.2)
RBC # BLD AUTO: 4.84 10*6/MM3 (ref 4.14–5.8)
SODIUM SERPL-SCNC: 140 MMOL/L (ref 136–145)
VANCOMYCIN TROUGH SERPL-MCNC: 12.2 MCG/ML (ref 5–20)
WBC NRBC COR # BLD AUTO: 5.92 10*3/MM3 (ref 3.4–10.8)

## 2025-04-11 PROCEDURE — 25810000003 SODIUM CHLORIDE 0.9 % SOLUTION: Performed by: STUDENT IN AN ORGANIZED HEALTH CARE EDUCATION/TRAINING PROGRAM

## 2025-04-11 PROCEDURE — G0378 HOSPITAL OBSERVATION PER HR: HCPCS

## 2025-04-11 PROCEDURE — 25810000003 SODIUM CHLORIDE 0.9 % SOLUTION 250 ML FLEX CONT: Performed by: INTERNAL MEDICINE

## 2025-04-11 PROCEDURE — 25010000002 VANCOMYCIN HCL 1.25 G RECONSTITUTED SOLUTION 1 EACH VIAL: Performed by: INTERNAL MEDICINE

## 2025-04-11 PROCEDURE — 85027 COMPLETE CBC AUTOMATED: CPT | Performed by: STUDENT IN AN ORGANIZED HEALTH CARE EDUCATION/TRAINING PROGRAM

## 2025-04-11 PROCEDURE — 25510000001 IOPAMIDOL 61 % SOLUTION: Performed by: STUDENT IN AN ORGANIZED HEALTH CARE EDUCATION/TRAINING PROGRAM

## 2025-04-11 PROCEDURE — 73701 CT LOWER EXTREMITY W/DYE: CPT

## 2025-04-11 PROCEDURE — 80048 BASIC METABOLIC PNL TOTAL CA: CPT | Performed by: STUDENT IN AN ORGANIZED HEALTH CARE EDUCATION/TRAINING PROGRAM

## 2025-04-11 PROCEDURE — 25010000002 VANCOMYCIN 10 G RECONSTITUTED SOLUTION: Performed by: STUDENT IN AN ORGANIZED HEALTH CARE EDUCATION/TRAINING PROGRAM

## 2025-04-11 PROCEDURE — 80202 ASSAY OF VANCOMYCIN: CPT | Performed by: INTERNAL MEDICINE

## 2025-04-11 PROCEDURE — 25010000002 CEFTRIAXONE PER 250 MG: Performed by: INTERNAL MEDICINE

## 2025-04-11 PROCEDURE — 25010000002 ENOXAPARIN PER 10 MG: Performed by: INTERNAL MEDICINE

## 2025-04-11 PROCEDURE — 96372 THER/PROPH/DIAG INJ SC/IM: CPT

## 2025-04-11 RX ORDER — IOPAMIDOL 612 MG/ML
100 INJECTION, SOLUTION INTRAVASCULAR
Status: COMPLETED | OUTPATIENT
Start: 2025-04-11 | End: 2025-04-11

## 2025-04-11 RX ADMIN — ASPIRIN 81 MG: 81 TABLET, COATED ORAL at 10:07

## 2025-04-11 RX ADMIN — VALSARTAN 320 MG: 80 TABLET ORAL at 10:07

## 2025-04-11 RX ADMIN — LEVOTHYROXINE SODIUM 25 MCG: 0.03 TABLET ORAL at 07:19

## 2025-04-11 RX ADMIN — CEFTRIAXONE 2000 MG: 2 INJECTION, POWDER, FOR SOLUTION INTRAMUSCULAR; INTRAVENOUS at 21:32

## 2025-04-11 RX ADMIN — ROSUVASTATIN CALCIUM 10 MG: 10 TABLET, FILM COATED ORAL at 10:07

## 2025-04-11 RX ADMIN — VANCOMYCIN HYDROCHLORIDE 1750 MG: 10 INJECTION, POWDER, LYOPHILIZED, FOR SOLUTION INTRAVENOUS at 23:00

## 2025-04-11 RX ADMIN — AMLODIPINE BESYLATE 10 MG: 10 TABLET ORAL at 10:07

## 2025-04-11 RX ADMIN — LAMOTRIGINE 100 MG: 100 TABLET ORAL at 21:32

## 2025-04-11 RX ADMIN — ENOXAPARIN SODIUM 40 MG: 100 INJECTION SUBCUTANEOUS at 21:32

## 2025-04-11 RX ADMIN — SODIUM CHLORIDE 1250 MG: 9 INJECTION, SOLUTION INTRAVENOUS at 10:07

## 2025-04-11 RX ADMIN — ENOXAPARIN SODIUM 40 MG: 100 INJECTION SUBCUTANEOUS at 10:07

## 2025-04-11 RX ADMIN — Medication 500 MCG: at 10:07

## 2025-04-11 RX ADMIN — ALLOPURINOL 300 MG: 300 TABLET ORAL at 10:07

## 2025-04-11 RX ADMIN — NEBIVOLOL 5 MG: 5 TABLET ORAL at 10:07

## 2025-04-11 RX ADMIN — IOPAMIDOL 90 ML: 612 INJECTION, SOLUTION INTRAVENOUS at 11:19

## 2025-04-11 NOTE — PLAN OF CARE
Problem: Adult Inpatient Plan of Care  Goal: Plan of Care Review  Outcome: Progressing  Flowsheets (Taken 4/11/2025 0295)  Progress: improving  Outcome Evaluation: Patient is alert, oriented and up ad fahad. VSS. Scheduled IV antibiotics given. Kept LLE elevated. No PRN meds needed this shift. Continue with plan of care.  Plan of Care Reviewed With:   patient   spouse   Goal Outcome Evaluation:  Plan of Care Reviewed With: patient, spouse        Progress: improving  Outcome Evaluation: Patient is alert, oriented and up ad fahad. VSS. Scheduled IV antibiotics given. Kept LLE elevated. No PRN meds needed this shift. Continue with plan of care.

## 2025-04-11 NOTE — PROGRESS NOTES
Name: Brandan Higuera ADMIT: 2025   : 1975  PCP: Gi Phan PA-C    MRN: [id number] LOS: 0 days   AGE/SEX: 49 y.o. male  ROOM: [address]     This is a 49-year-old male with a past medical history of hypertension who presented to hospital after experiencing redness of his left lower leg.  He was actually seen at the [facility] department where he had normal blood work and ultrasound of the left lower extremity, with the exception of his partially thrombosed superficial vein.  He was discharged on Keflex and doxycycline however he developed a fever and noted that the area of redness had increased in size.  He then presented to hospital where he was admitted for IV antibiotics.    : erythema receeding     Objective   Objective   Vital Signs  Temp:  [96.6 °F (35.9 °C)-98.3 °F (36.8 °C)] 97.3 °F (36.3 °C)  Heart Rate:  [72-76] 72  Resp:  [16] 16  BP: (125-137)/(69-78) 125/78  SpO2:  [95 %-97 %] 97 %  on   ;   Device (Oxygen Therapy): room air  Body mass index is 41.63 kg/m².  Physical Exam  Constitutional:       General: He is not in acute distress.  HENT:      Head: Normocephalic and atraumatic.   Eyes:      Extraocular Movements: Extraocular movements intact.      Pupils: Pupils are equal, round, and reactive to light.   Cardiovascular:      Rate and Rhythm: Normal rate and regular rhythm.   Pulmonary:      Effort: Pulmonary effort is normal. No respiratory distress.   Abdominal:      General: There is no distension.      Palpations: Abdomen is soft.      Tenderness: There is no abdominal tenderness.   Musculoskeletal:      Comments: Erythema of LLE- improving    Neurological:      General: No focal deficit present.      Mental Status: He is alert and oriented to person, place, and time.         Results Review     I reviewed the patient's new clinical results.  Results from last 7 days   Lab Units 25  0753 04/10/25  0635 25  1436 25  1142   WBC 10*3/mm3 5.92 6.58 7.76  "10.80   HEMOGLOBIN g/dL 13.2 13.4 14.1 14.0   PLATELETS 10*3/mm3 202 181 146 143     Results from last 7 days   Lab Units 04/11/25  0753 04/10/25  0635 04/09/25  1436 04/07/25  1142   SODIUM mmol/L 140 142 142 139   POTASSIUM mmol/L 4.1 3.8 3.8 4.0   CHLORIDE mmol/L 104 106 104 105   CO2 mmol/L 26.2 27.5 28.0 26.3   BUN mg/dL 11 10 7 10   CREATININE mg/dL 1.09 1.17 1.23 1.34*   GLUCOSE mg/dL 122* 105* 142* 114*   Estimated Creatinine Clearance: 107.4 mL/min (by C-G formula based on SCr of 1.09 mg/dL).  Results from last 7 days   Lab Units 04/09/25  1436   ALBUMIN g/dL 4.2   BILIRUBIN mg/dL 0.5   ALK PHOS U/L 103   AST (SGOT) U/L 32   ALT (SGPT) U/L 54*     Results from last 7 days   Lab Units 04/11/25  0753 04/10/25  0635 04/09/25  1436 04/07/25  1142   CALCIUM mg/dL 9.0 9.3 9.6 8.9   ALBUMIN g/dL  --   --  4.2  --      Results from last 7 days   Lab Units 04/09/25  2342 04/09/25  2059   LACTATE mmol/L 0.9 2.1*   No results found for: \"COVID19\"  No results found for: \"HGBA1C\", \"POCGLU\"  No results found for this or any previous visit.      Duplex Venous Lower Extremity - Left CAR    Normal left lower extremity venous duplex scan.    Scheduled Medications  allopurinol, 300 mg, Oral, Daily  amLODIPine, 10 mg, Oral, Daily  aspirin, 81 mg, Oral, Daily  cefTRIAXone, 2,000 mg, Intravenous, Q24H  enoxaparin sodium, 40 mg, Subcutaneous, Q12H  lamoTRIgine, 100 mg, Oral, Nightly  levothyroxine, 25 mcg, Oral, Q AM  nebivolol, 5 mg, Oral, Daily  rosuvastatin, 10 mg, Oral, Daily  valsartan, 320 mg, Oral, Daily  vancomycin, 1,250 mg, Intravenous, Q12H  vitamin B-12, 500 mcg, Oral, Daily    Infusions  Pharmacy to dose vancomycin,     Diet  Diet: Regular/House; Fluid Consistency: Thin (IDDSI 0)       Assessment/Plan     Active Hospital Problems    Diagnosis  POA    **Cellulitis [L03.90]  Yes    Hypothyroidism, acquired [E03.9]  Yes    Mixed hyperlipidemia [E78.2]  Yes    Hypertension [I10]  Yes      Resolved Hospital Problems   No " resolved problems to display.       49 y.o. male admitted with Cellulitis.    Cellulitis  Venous duplex of the left lower extremity was negative for DVT but did show a partially occlusive superficial thrombosis  I am going to order a CT scan to rule out any abscess or deeper tissue infection as he has failed to respond to appropriate outpatient antibiotic therapy  Continue vancomycin and Rocephin today. Plan to transition to PO Abx tomorrow and DC home    Hypertension  Bipolar disorder  Gout  Hypothyroidism  Hyperlipidemia  Resume home medications     Lovenox 40 mg SC daily for DVT prophylaxis.  Full code.  Discussed with patient.  Anticipate discharge home tomorrow      Rakesh Hall MD  [facility]  04/11/25  12:02 EDT

## 2025-04-11 NOTE — PROGRESS NOTES
[facility] Clinical Pharmacy Services: Vancomycin Level Monitoring Note    Brandan Higuera is a 49 y.o. male who is on day 3 of pharmacy to dose vancomycin for Skin and Soft Tissue.    Estimated Creatinine Clearance: 107.4 mL/min (by C-G formula based on SCr of 1.09 mg/dL).    Current Vanc Dose:  1250 mg IV every  12  hours  Results from last 7 days   Lab Units 04/11/25  0753 04/10/25  1903   VANCOMYCIN TR mcg/mL 12.20 15.50   Predicted AUC at current dose: Follow up level this AM drawn 10 hours from previous dose = 12.2mcg/ml, estimated AUC = 361mg/L*hr. Plan to adjust to Vancomycin 1750mg (14.1mg/kg) IV q12h. Estimated AUC = 505mg/L*hr  Next Level Date and Time: No further plans for levels, tentative plan to transition to oral antibiotics tomorrow and discharge.    Pharmacy is continuing to monitor and will adjust as needed.    Wayne Haynes, [facility]  Clinical Pharmacist

## 2025-04-11 NOTE — PROGRESS NOTES
[facility] Clinical Pharmacy Services: Vancomycin Level Monitoring Note    Brandan Higuera is a 49 y.o. male who is on day 2/7 of pharmacy to dose vancomycin for Skin and Soft Tissue.    Estimated Creatinine Clearance: 100 mL/min (by C-G formula based on SCr of 1.17 mg/dL).    Current Vanc Dose:  1250 mg IV every  12  hours  Results from last 7 days   Lab Units 04/10/25  1903   VANCOMYCIN TR mcg/mL 15.50   Predicted AUC at current dose:592 mg/L.hr which is within goal but on higher range of normal. However, 2nd dose was given 3 hours late and therefore trough was drawn only 6 hours post dose. Will continue current regimen for now and order another trough prior to 4th dose   Next Level Date and Time: Vanc Trough on 4/11 @ 0800    Pharmacy is continuing to monitor and will adjust as needed.    Aurora Fang [facility]  Clinical Pharmacist

## 2025-04-11 NOTE — PLAN OF CARE
Goal Outcome Evaluation:           Progress: improving  Outcome Evaluation: A&Ox4, up ad fahad. CT scan today. VSS. LLE looks less red, and pt endorses that feels better. No distress noted. Possible D/C tomorrow.                              Screen#: [id number]  Screen Date: N/A  Screen Comment: N/A     Screen#: [id number]  Screen Date: 2021  Screen Comment: N/A

## 2025-04-12 ENCOUNTER — READMISSION MANAGEMENT (OUTPATIENT)
Dept: CALL CENTER | Facility: HOSPITAL | Age: 50
End: 2025-04-12
Payer: COMMERCIAL

## 2025-04-12 VITALS
DIASTOLIC BLOOD PRESSURE: 92 MMHG | BODY MASS INDEX: 41.5 KG/M2 | HEART RATE: 80 BPM | SYSTOLIC BLOOD PRESSURE: 140 MMHG | WEIGHT: 273.81 LBS | TEMPERATURE: 97.2 F | HEIGHT: 68 IN | OXYGEN SATURATION: 98 % | RESPIRATION RATE: 16 BRPM

## 2025-04-12 PROCEDURE — 25810000003 SODIUM CHLORIDE 0.9 % SOLUTION: Performed by: STUDENT IN AN ORGANIZED HEALTH CARE EDUCATION/TRAINING PROGRAM

## 2025-04-12 PROCEDURE — 99222 1ST HOSP IP/OBS MODERATE 55: CPT | Performed by: INTERNAL MEDICINE

## 2025-04-12 PROCEDURE — G0378 HOSPITAL OBSERVATION PER HR: HCPCS

## 2025-04-12 PROCEDURE — 96372 THER/PROPH/DIAG INJ SC/IM: CPT

## 2025-04-12 PROCEDURE — 25010000002 ENOXAPARIN PER 10 MG: Performed by: INTERNAL MEDICINE

## 2025-04-12 PROCEDURE — 25010000002 VANCOMYCIN 10 G RECONSTITUTED SOLUTION: Performed by: STUDENT IN AN ORGANIZED HEALTH CARE EDUCATION/TRAINING PROGRAM

## 2025-04-12 RX ORDER — SULFAMETHOXAZOLE AND TRIMETHOPRIM 800; 160 MG/1; MG/1
1 TABLET ORAL 2 TIMES DAILY
Qty: 10 TABLET | Refills: 0 | Status: SHIPPED | OUTPATIENT
Start: 2025-04-12

## 2025-04-12 RX ADMIN — NEBIVOLOL 5 MG: 5 TABLET ORAL at 09:11

## 2025-04-12 RX ADMIN — AMLODIPINE BESYLATE 10 MG: 10 TABLET ORAL at 09:11

## 2025-04-12 RX ADMIN — ALLOPURINOL 300 MG: 300 TABLET ORAL at 09:12

## 2025-04-12 RX ADMIN — ASPIRIN 81 MG: 81 TABLET, COATED ORAL at 09:12

## 2025-04-12 RX ADMIN — ENOXAPARIN SODIUM 40 MG: 100 INJECTION SUBCUTANEOUS at 09:12

## 2025-04-12 RX ADMIN — VALSARTAN 320 MG: 80 TABLET ORAL at 09:11

## 2025-04-12 RX ADMIN — VANCOMYCIN HYDROCHLORIDE 1750 MG: 10 INJECTION, POWDER, LYOPHILIZED, FOR SOLUTION INTRAVENOUS at 09:22

## 2025-04-12 RX ADMIN — ROSUVASTATIN CALCIUM 10 MG: 10 TABLET, FILM COATED ORAL at 09:11

## 2025-04-12 RX ADMIN — LEVOTHYROXINE SODIUM 25 MCG: 0.03 TABLET ORAL at 07:20

## 2025-04-12 RX ADMIN — Medication 500 MCG: at 09:11

## 2025-04-12 NOTE — CONSULTS
.     REASON FOR CONSULTATION:   current saphenous vein thrombosis, family history of dvt, risk factor include . Opinion on therpaueitic anticoagulation prophalactically?   Provide an opinion on any further workup or treatment                             REQUESTING PHYSICIAN: Gi Phan PA-C  RECORDS OBTAINED:  Records of the patient's history including those from the electronic medical record were reviewed and summarized in detail.    HISTORY OF PRESENT ILLNESS:  The patient is a 49 y.o. year old male  who is here for follow-up with the above-mentioned history.    Admitted through the ER on 4/ 8/25.  Came in for redness and swelling left lower leg.  Was seen a few days prior and diagnosed with cellulitis and a superficial clot and was sent home on antibiotics and aspirin.  Leg worsened and developed fever up to 101 admitted for IV antibiotics.  It was noted left lower extremity Doppler was negative for DVT but did show partially occlusive superficial thrombus.  The plan is to hopefully discharge home today.  Further details below    Past Medical History:   Diagnosis Date    Allergic     Bipolar disorder     Hyperlipidemia     Hypertension     Insomnia     Vitamin D deficiency disease      History reviewed. No pertinent surgical history.    MEDICATIONS    Current Facility-Administered Medications:     acetaminophen (TYLENOL) tablet 650 mg, 650 mg, Oral, Q4H PRN **OR** acetaminophen (TYLENOL) 160 MG/5ML oral solution 650 mg, 650 mg, Oral, Q4H PRN **OR** acetaminophen (TYLENOL) suppository 650 mg, 650 mg, Rectal, Q4H PRN, Lisa Sepulveda MD    allopurinol (ZYLOPRIM) tablet 300 mg, 300 mg, Oral, Daily, Lisa Sepulveda MD, 300 mg at 04/12/25 0912    amLODIPine (NORVASC) tablet 10 mg, 10 mg, Oral, Daily, Lisa Sepulveda MD, 10 mg at 04/12/25 0911    aspirin EC tablet 81 mg, 81 mg, Oral, Daily, Lisa Sepulveda MD, 81 mg at 04/12/25 0912    sennosides-docusate (PERICOLACE)  8.6-50 MG per tablet 2 tablet, 2 tablet, Oral, BID PRN **AND** polyethylene glycol (MIRALAX) packet 17 g, 17 g, Oral, Daily PRN **AND** bisacodyl (DULCOLAX) EC tablet 5 mg, 5 mg, Oral, Daily PRN **AND** bisacodyl (DULCOLAX) suppository 10 mg, 10 mg, Rectal, Daily PRN, Lisa Sepulveda MD    cefTRIAXone (ROCEPHIN) 2,000 mg in sodium chloride 0.9 % 100 mL MBP, 2,000 mg, Intravenous, Q24H, Lisa Sepulveda MD, Last Rate: 200 mL/hr at 04/11/25 2132, 2,000 mg at 04/11/25 2132    enoxaparin sodium (LOVENOX) syringe 40 mg, 40 mg, Subcutaneous, Q12H, Lisa Sepulveda MD, 40 mg at 04/12/25 0912    lamoTRIgine (LaMICtal) tablet 100 mg, 100 mg, Oral, Nightly, Lisa Sepulveda MD, 100 mg at 04/11/25 2132    levothyroxine (SYNTHROID, LEVOTHROID) tablet 25 mcg, 25 mcg, Oral, Q AM, Lisa Sepulveda MD, 25 mcg at 04/12/25 0720    nebivolol (BYSTOLIC) tablet 5 mg, 5 mg, Oral, Daily, Lisa Sepulveda MD, 5 mg at 04/12/25 0911    ondansetron (ZOFRAN) injection 4 mg, 4 mg, Intravenous, Q6H PRN, Lisa Sepulveda MD    Pharmacy to dose vancomycin, , Not Applicable, Continuous PRN, Lisa Sepulveda MD    rosuvastatin (CRESTOR) tablet 10 mg, 10 mg, Oral, Daily, Lisa Sepulveda MD, 10 mg at 04/12/25 0911    valsartan (DIOVAN) tablet 320 mg, 320 mg, Oral, Daily, Lisa Sepulveda MD, 320 mg at 04/12/25 0911    vancomycin 1750 mg/500 mL 0.9% NS IVPB ([facility]), 1,750 mg, Intravenous, Q12H, Rakesh Hall MD, 1,750 mg at 04/12/25 0922    vitamin B-12 (CYANOCOBALAMIN) tablet 500 mcg, 500 mcg, Oral, Daily, Stingl, Lisa Russo MD, 500 mcg at 04/12/25 0911    ALLERGIES:     Allergies   Allergen Reactions    Penicillins Swelling       SOCIAL HISTORY:       Social History     Socioeconomic History    Marital status:    Tobacco Use    Smoking status: Former     Types: Cigarettes     Passive exposure: Never    Smokeless tobacco: Never   Vaping Use    Vaping status: Former   Substance and  Sexual Activity    Alcohol use: Yes     Comment: casual    Drug use: No    Sexual activity: Defer         FAMILY HISTORY:  Family History   Problem Relation Age of Onset    Depression Mother     Diabetes Mother     Hyperlipidemia Mother     Hypertension Mother     Depression Sister     Heart disease Brother        REVIEW OF SYSTEMS:  Review of Systems   Constitutional:  Negative for activity change.   HENT:  Negative for nosebleeds and trouble swallowing.    Respiratory:  Negative for shortness of breath and wheezing.    Cardiovascular:  Negative for chest pain and palpitations.   Gastrointestinal:  Negative for constipation, diarrhea and nausea.   Genitourinary:  Negative for dysuria and hematuria.   Musculoskeletal:  Negative for arthralgias and myalgias.   Neurological:  Negative for seizures and syncope.   Hematological:  Negative for adenopathy. Does not bruise/bleed easily.   Psychiatric/Behavioral:  Negative for confusion.               Vitals:    04/11/25 2006 04/12/25 0001 04/12/25 0427 04/12/25 0911   BP: 138/84  137/80 126/73   BP Location: Right arm  Left arm Left arm   Patient Position: Sitting  Lying Sitting   Pulse: 76  69 74   Resp: 18  16 16   Temp: 97.5 °F (36.4 °C) Comment: pt. refused vitals 96.8 °F (36 °C) 97.8 °F (36.6 °C)   TempSrc: Oral  Oral Oral   SpO2: 97%  98% 98%   Weight:       Height:              No data to display               PHYSICAL EXAM:    CONSTITUTIONAL:  Vital signs reviewed.  No distress, looks comfortable.  EYES:  Conjunctivae and lids unremarkable.  PERRLA  EARS, NOSE, MOUTH, THROAT:  Ears and nose appear unremarkable.  Lips, teeth, gums appear unremarkable.  RESPIRATORY:  Normal respiratory effort.  Lungs clear to auscultation bilaterally.  CARDIOVASCULAR:  Normal S1, S2.  No murmurs, rubs or gallops.  No significant lower extremity edema.  GASTROINTESTINAL: Abdomen appears unremarkable.  Nontender.  No hepatomegaly.  No splenomegaly.  LYMPHATIC:  No cervical,  "supraclavicular, axillary lymphadenopathy.  NEURO: Cranial nerves 2-12 grossly intact.  No focal deficits.  Appears to have equal strength all 4 extremities.  MUSCULOSKELETAL:  Unremarkable digits/nails.  No cyanosis or clubbing.  No apparent joint deformities.  SKIN:  Warm.  No rashes.  PSYCHIATRIC:  Normal judgment and insight.  Normal mood and affect.     RECENT LABS:        WBC   Date Value Ref Range Status   2025 5.92 3.40 - 10.80 10*3/mm3 Final   04/10/2025 6.58 3.40 - 10.80 10*3/mm3 Final   2025 7.76 3.40 - 10.80 10*3/mm3 Final     Hemoglobin   Date Value Ref Range Status   2025 13.2 13.0 - 17.7 g/dL Final   04/10/2025 13.4 13.0 - 17.7 g/dL Final   2025 14.1 13.0 - 17.7 g/dL Final     Platelets   Date Value Ref Range Status   2025 202 140 - 450 10*3/mm3 Final   04/10/2025 181 140 - 450 10*3/mm3 Final   2025 146 140 - 450 10*3/mm3 Final       Assessment & Plan   Brandan Higuera [unfilled]         *Partially thrombosed superficial varicosity on Doppler 25  Left leg Doppler negative on 4/ 10/25 including deep and the great saphenous superficial vein  CT left leg  read as thrombosis of greater saphenous vein at the level of the dorsal midfoot.    (It was also read as patchy predominantly subcutaneous soft tissue edema throughout left leg, greatest anteriorly with trace subcutaneous soft tissue fluid at anterior medial distal lower leg and ankle.  No drainable fluid collection definitive for abscess.)    *Family history of DVT and patient is a   Mother with 2 DVTs.  Brother who is physically fit  while jogging after knee replacement.  Patient states autopsy revealed \" and unknown heart condition\"  We were ask if he should be on anticoagulation in this scenario    *Admitted for cellulitis with IV antibiotics.  Improved, expected to go home today,     Plan  I told him I do not think we can justify the risks of anticoagulation in this situation: " Just a superficial thrombus of the great saphenous vein at the midfoot.  Left leg otherwise negative.  He is a .  His route is to [place].  He states it is about 4 hours and 40 minutes and he typically gets out once in the middle of his route.  He states it will be no problem for him to get out twice during his route.  I advised him to do this.  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  I recommended regular exercise and every 10 pounds of weight he can lose will help to reduce his risk of clotting.  I told him he could use NSAIDs if the area is uncomfortable to decrease inflammation.  I did review with him for patients who have had a documented DVT and complete their course of therapeutic anticoagulation studies show aspirin 81 mg decreases the risk of a second DVT.  I told him we have no data for his scenario and therefore we have no guidelines to recommend aspirin 81 mg in his situation.  However, extrapolating how we know, perhaps an enteric-coated 81 mg aspirin might decrease his risk of clotting.  Certainly, any medicine including enteric-coated aspirin 81 mg can have some risks and if he ever develops a reason to not take aspirin he could stop it because there is no clear data backed reason to begin it.  He seems to favor taking aspirin 81 mg daily, enteric-coated.  No follow-up needed in our office.

## 2025-04-12 NOTE — PLAN OF CARE
Goal Outcome Evaluation:   VSS. A&Ox4 and up ad fahad. No complaints of pain overnight. IV ABX given as ordered. Possible discharge today. Care continuing.

## 2025-04-12 NOTE — PLAN OF CARE
"Goal Outcome Evaluation:           Progress: improving  Outcome Evaluation: Pt alert, oriented pleasant and cooperative with staff. Erythema on left lower extremity retreating, pt and wife report it \"looks so much better.\" Warmth still noted. Pt received Vancomycin IV dose this a.m. Evaluated by Dr. Hall and Hematology. Orders to d/c home on p.o antibiotics. Nsg education provided on cellulitits and medication changes. Pt had no further questions. IV to right a/c discontinued. Meds to beds. Nsg to cont with plan of care until d/c. Wife providing transportation home.                             "

## 2025-04-12 NOTE — DISCHARGE SUMMARY
Patient Name: Brandan Higuera  : 1975  MRN: [id number]    Date of Admission: 2025  Date of Discharge:  2025  Primary Care Physician: Gi hPan PA-C      Chief Complaint:   Wound Check      Discharge Diagnoses     Active Hospital Problems    Diagnosis  POA    **Cellulitis [L03.90]  Yes    Hypothyroidism, acquired [E03.9]  Yes    Mixed hyperlipidemia [E78.2]  Yes    Hypertension [I10]  Yes      Resolved Hospital Problems   No resolved problems to display.        Hospital Course     This is a 49-year-old male with a past medical history of hypertension who presented to hospital after experiencing redness of his left lower leg.  He was actually seen at the [facility] department where he had normal blood work and ultrasound of the left lower extremity, with the exception of his partially thrombosed superficial vein.  He was discharged on Keflex and doxycycline however he developed a fever and noted that the area of redness had increased in size.  He then presented to hospital where he was admitted for IV antibiotics.  He was started on vancomycin and ceftriaxone with improvement in his symptoms.  He did undergo CT scan that showed no evidence of a drainable abscess but did show a superficial clot in the greater saphenous vein.  The patient has a risk factors for developing DVT with a family history and occupational risks of being a .  I had hematology come by and see him to discuss the risks versus benefits of starting anticoagulation prophylactically and it was determined that the risks at this time not be justified.  He was therefore discharged on oral Bactrim to take 1 tablet twice a day for 5 days to complete a 7 day course of antibiotic therapy        Physical Exam:  Temp:  [96.6 °F (35.9 °C)-97.8 °F (36.6 °C)] 97.2 °F (36.2 °C)  Heart Rate:  [69-80] 80  Resp:  [16-18] 16  BP: (126-140)/(73-92) 140/92  Body mass index is 41.63 kg/m².  Physical Exam  Constitutional:       General: He is  not in acute distress.  HENT:      Head: Normocephalic and atraumatic.   Cardiovascular:      Rate and Rhythm: Normal rate and regular rhythm.   Pulmonary:      Effort: Pulmonary effort is normal. No respiratory distress.   Abdominal:      General: There is no distension.      Palpations: Abdomen is soft.      Tenderness: There is no abdominal tenderness.   Skin:     General: Skin is warm and dry.      Comments: LLE with some redness, but the redness has improved from previous    Neurological:      General: No focal deficit present.      Mental Status: He is alert and oriented to person, place, and time.         Consultants     Consult Orders (all) (From admission, onward)       Start     Ordered    04/12/25 1011  Hematology & Oncology Inpatient Consult  Once        Specialty:  Hematology and Oncology  Provider:  Eduard Acosta II, MD    04/12/25 1011                  Procedures     Imaging Results (All)       Procedure Component Value Units Date/Time    CT Lower Extremity Left With Contrast [[id number]] Collected: 04/11/25 1158     Updated: 04/11/25 1218    Narrative:      CT LOWER EXTREMITY LEFT W CONTRAST-     DATE OF EXAM: 4/11/2025 11:12 AM     INDICATION: Colitis. Evaluate for abscess.     COMPARISON: Left lower extremity venous Doppler ultrasound 4/7/2025.     TECHNIQUE: Multiple contiguous axial images were acquired through the  left lower extremity from the femoral condyles through the knee, tibia,  fibula, ankle, and hindfoot following the intravenous administration of  90 mL of Isovue-300. Reformatted coronal and sagittal sequences were  also reviewed. Radiation dose reduction techniques were utilized,  including automated exposure control and exposure modulation based on  body size.     FINDINGS:  Diffuse patchy predominately subcutaneous soft tissue edema throughout  the lower leg, greatest anteriorly, with trace subcutaneous soft tissue  fluid at the anteromedial distal lower leg and ankle. No  well-formed  drainable fluid collection definitive for abscess. No discrete wound is  identified. No cystic or solid soft tissue mass. No nodular or masslike  enhancement. No soft tissue air. Partially imaged thrombosis of the  greater saphenous vein at the level of the dorsal midfoot. No evidence  of a deep venous thrombosis.     No acute fracture. Sclerotic foci in the distal femur and proximal  tibia, probably benign bone islands. Additional sclerotic focus in the  mid to distal fibular axis, also likely benign. No lytic or destructive  changes to suggest osteomyelitis. No concerning osseous lesion.     The knee is partially included in the field-of-view. Mild to moderate  DJD in the medial and lateral compartments of the knee. The knee  cruciate and collateral ligaments are grossly intact. No significant  tibiotalar or subtalar joint effusion. The tibiotalar and subtalar joint  spaces are well-maintained.     The patellar tendon is intact. The Achilles tendon is intact. The  partially imaged medial flexor tendons, peroneal tendons, and extensor  tendons are intact. No abnormal tenosynovial fluid. No significant  muscular fatty atrophy.       Impression:         1. Thrombosis of the greater saphenous vein at the level of the dorsal  midfoot.  2. Patchy predominantly subcutaneous soft tissue edema throughout the  left lower extremity, radius anteriorly, with trace subcutaneous soft  tissue fluid at the anteromedial distal lower leg and ankle. No  well-formed or drainable fluid collection definitive for abscess. No  soft tissue air.  3. No CT evidence of osteomyelitis.     This report was finalized on 4/11/2025 12:15 PM by Kingsley Eldridge MD on  Workstation: [id number]               Pertinent Labs     Results from last 7 days   Lab Units 04/11/25  0753 04/10/25  0635 04/09/25  1436 04/07/25  1142   WBC 10*3/mm3 5.92 6.58 7.76 10.80   HEMOGLOBIN g/dL 13.2 13.4 14.1 14.0   PLATELETS 10*3/mm3 202 181 146 143  "    Results from last 7 days   Lab Units 04/11/25  0753 04/10/25  0635 04/09/25  1436 04/07/25  1142   SODIUM mmol/L 140 142 142 139   POTASSIUM mmol/L 4.1 3.8 3.8 4.0   CHLORIDE mmol/L 104 106 104 105   CO2 mmol/L 26.2 27.5 28.0 26.3   BUN mg/dL 11 10 7 10   CREATININE mg/dL 1.09 1.17 1.23 1.34*   GLUCOSE mg/dL 122* 105* 142* 114*   Estimated Creatinine Clearance: 107.4 mL/min (by C-G formula based on SCr of 1.09 mg/dL).  Results from last 7 days   Lab Units 04/09/25  1436   ALBUMIN g/dL 4.2   BILIRUBIN mg/dL 0.5   ALK PHOS U/L 103   AST (SGOT) U/L 32   ALT (SGPT) U/L 54*     Results from last 7 days   Lab Units 04/11/25  0753 04/10/25  0635 04/09/25  1436 04/07/25  1142   CALCIUM mg/dL 9.0 9.3 9.6 8.9   ALBUMIN g/dL  --   --  4.2  --                Invalid input(s): \"LDLCALC\"        Test Results Pending at Discharge       Discharge Details        Discharge Medications        New Medications        Instructions Start Date   sulfamethoxazole-trimethoprim 800-160 MG per tablet  Commonly known as: Bactrim DS   1 tablet, Oral, 2 Times Daily             Continue These Medications        Instructions Start Date   allopurinol 300 MG tablet  Commonly known as: ZYLOPRIM   300 mg, Oral, Daily, For gout suppression      amLODIPine 10 MG tablet  Commonly known as: NORVASC   10 mg, Oral, Daily, for blood pressure.      aspirin 81 MG EC tablet   81 mg, Oral, Daily      Caplyta 42 MG capsule  Generic drug: Lumateperone Tosylate       Cholecalciferol 50 MCG (2000 UT) capsule   One PO daily      Cyanocobalamin 1000 MCG capsule   1 PO daily      lamoTRIgine 100 MG tablet  Commonly known as: LaMICtal   TAKE 1 TABLET BY MOUTH AT BEDTIME      levothyroxine 25 MCG tablet  Commonly known as: SYNTHROID, LEVOTHROID   One PO daily before breakfast for thyroid      nebivolol 5 MG tablet  Commonly known as: BYSTOLIC   5 mg, Oral, Daily, for blood pressure      omega-3 acid ethyl esters 1 g capsule  Commonly known as: LOVAZA   2 g, Oral, 2 " Times Daily, for triglycerides      predniSONE 20 MG tablet  Commonly known as: DELTASONE   20 mg, Oral, 2 Times Daily, With food for 5 days for gout flare      rosuvastatin 10 MG tablet  Commonly known as: CRESTOR   10 mg, Oral, Daily, For cholesterol and patient aware this medication in combination with colchicine can increase his risk of myalgias----stop and call if develop      valsartan 320 MG tablet  Commonly known as: DIOVAN   320 mg, Oral, Daily, For blood pressure      Wegovy 1.7 MG/0.75ML solution auto-injector  Generic drug: Semaglutide-Weight Management   1.7 mg, Subcutaneous, Weekly, Inject 1.7 mg subcutaneously once weekly for obesity treatment             Stop These Medications      cephalexin 500 MG capsule  Commonly known as: KEFLEX     doxycycline 100 MG capsule  Commonly known as: MONODOX              Allergies   Allergen Reactions    Penicillins Swelling         Discharge Disposition:  Home or Self Care    Discharge Diet:  Diet Order   Procedures    Diet: Regular/House; Fluid Consistency: Thin (IDDSI 0)       Discharge Activity:       CODE STATUS:    Code Status and Medical Interventions: CPR (Attempt to Resuscitate); Full Support   Ordered at: 04/09/25 1850     Code Status (Patient has no pulse and is not breathing):    CPR (Attempt to Resuscitate)     Medical Interventions (Patient has pulse or is breathing):    Full Support       Future Appointments   Date Time Provider Department Center   8/25/2025  8:30 AM Gi Phan PA-C [id number]      Follow-up Information       Gi Phan PA-C .    Specialty: Family Medicine  Contact information:  [address] [address] [address] [phone]                             Time Spent on Discharge:  Greater than 30 minutes      Rakesh Hall MD  [facility]  04/12/25  12:58 EDT

## 2025-04-13 ENCOUNTER — NURSE TRIAGE (OUTPATIENT)
Dept: CALL CENTER | Facility: HOSPITAL | Age: 50
End: 2025-04-13
Payer: COMMERCIAL

## 2025-04-13 NOTE — TELEPHONE ENCOUNTER
"Reason for Disposition   [1] Caller requesting NON-URGENT health information AND [2] PCP's office is the best resource    Additional Information   Negative: [1] Caller is not with the adult (patient) AND [2] reporting urgent symptoms   Negative: Lab result questions   Negative: Medication questions   Negative: Caller can't be reached by phone   Negative: Caller has already spoken to PCP or another triager   Negative: RN needs further essential information from caller in order to complete triage   Negative: Requesting regular office appointment    Answer Assessment - Initial Assessment Questions  1. REASON FOR CALL or QUESTION: \"What is your reason for calling today?\" or \"How can I best help you?\" or \"What question do you have that I can help answer?\"      Work note    Protocols used: Information Only Call-ADULT-    "

## 2025-04-14 ENCOUNTER — TRANSITIONAL CARE MANAGEMENT TELEPHONE ENCOUNTER (OUTPATIENT)
Dept: CALL CENTER | Facility: HOSPITAL | Age: 50
End: 2025-04-14
Payer: COMMERCIAL

## 2025-04-14 NOTE — OUTREACH NOTE
Call Center TCM Note      Flowsheet Row Responses   [facility] patient discharged from? [place]   Does the patient have one of the following disease processes/diagnoses(primary or secondary)? Other   TCM attempt successful? No   Unsuccessful attempts Attempt 1            MATTY VALDES - Medical Assistant    4/14/2025, 14:05 EDT

## 2025-04-14 NOTE — OUTREACH NOTE
Call Center TCM Note      Flowsheet Row Responses   [facility] patient discharged from? [place]   Does the patient have one of the following disease processes/diagnoses(primary or secondary)? Other   TCM attempt successful? No   Unsuccessful attempts Attempt 2            MATTY VALDES - Medical Assistant    4/14/2025, 16:07 EDT

## 2025-04-14 NOTE — PROGRESS NOTES
Case Management Discharge Note      Final Note: Discharged to home on 4/12/25 @ 12:58. BHenrietta Carrillo Rn CCP.         Selected Continued Care - Discharged on 4/12/2025 Admission date: 4/9/2025 - Discharge disposition: Home or Self Care      Destination    No services have been selected for the patient.                Durable Medical Equipment    No services have been selected for the patient.                Dialysis/Infusion    No services have been selected for the patient.                Home Medical Care    No services have been selected for the patient.                Therapy    No services have been selected for the patient.                Community Resources    No services have been selected for the patient.                Community & DME    No services have been selected for the patient.                    Transportation Services  Private: Car    Final Discharge Disposition Code: 01 - home or self-care

## 2025-04-15 ENCOUNTER — TRANSITIONAL CARE MANAGEMENT TELEPHONE ENCOUNTER (OUTPATIENT)
Dept: CALL CENTER | Facility: HOSPITAL | Age: 50
End: 2025-04-15
Payer: COMMERCIAL

## 2025-04-15 ENCOUNTER — TELEPHONE (OUTPATIENT)
Dept: FAMILY MEDICINE CLINIC | Facility: CLINIC | Age: 50
End: 2025-04-15
Payer: COMMERCIAL

## 2025-04-15 NOTE — TELEPHONE ENCOUNTER
HUB TO RELAY  Left patient a voicemail to contact our office to schedule his hospital follow-up appt

## 2025-04-15 NOTE — OUTREACH NOTE
Call Center TCM Note      Flowsheet Row Responses   [facility] patient discharged from? [place]   Does the patient have one of the following disease processes/diagnoses(primary or secondary)? Other   TCM attempt successful? No   Unsuccessful attempts Attempt 3   Wrap up additional comments D/C DX: Cellulitis LLE - Unable to reach pt x 3 attempts for TCM call. Pt is not yet scheduled for TCM APPT with PCP LESTER Phan. Discharged from [facility] 04/12/2025            MATTY VALDES - Medical Assistant    4/15/2025, 12:03 EDT

## 2025-04-16 ENCOUNTER — TELEPHONE (OUTPATIENT)
Dept: FAMILY MEDICINE CLINIC | Facility: CLINIC | Age: 50
End: 2025-04-16

## 2025-04-16 NOTE — TELEPHONE ENCOUNTER
Caller: Brandan Higuera    Relationship to patient: Self    Best call back number:   Telephone Information:   Mobile [phone]     New or established patient?  [] New  [x] Established    Date of discharge: 4/12/25    Facility discharged from: [facility]    Diagnosis/Symptoms: CELLULITIS    Length of stay (If applicable): 4/9/25-4/12/25    Specialty Only: Did you see a [facility] provider?    [] Yes  [] No  If so, who?     Additional Details: THERE WAS NO AVAILABILITY WITHIN 7 DAYS OF PATIENT'S DISCHARGE AND HUB WAS UNABLE TO WARM TRANSFER. PATIENT IS NEEDING A CALLBACK FOR SCHEDULING NEEDS.

## 2025-04-22 RX ORDER — SULFAMETHOXAZOLE AND TRIMETHOPRIM 800; 160 MG/1; MG/1
1 TABLET ORAL 2 TIMES DAILY
Qty: 20 TABLET | Refills: 0 | Status: SHIPPED | OUTPATIENT
Start: 2025-04-22 | End: 2025-04-22 | Stop reason: SDUPTHER

## 2025-04-22 RX ORDER — PREDNISONE 20 MG/1
20 TABLET ORAL 2 TIMES DAILY
Qty: 10 TABLET | Refills: 2 | Status: SHIPPED | OUTPATIENT
Start: 2025-04-22

## 2025-04-22 RX ORDER — SULFAMETHOXAZOLE AND TRIMETHOPRIM 800; 160 MG/1; MG/1
1 TABLET ORAL 2 TIMES DAILY
Qty: 20 TABLET | Refills: 0 | Status: SHIPPED | OUTPATIENT
Start: 2025-04-22

## 2025-08-10 RX ORDER — NEBIVOLOL 5 MG/1
5 TABLET ORAL DAILY
Qty: 90 TABLET | Refills: 0 | Status: SHIPPED | OUTPATIENT
Start: 2025-08-10

## 2025-08-25 ENCOUNTER — OFFICE VISIT (OUTPATIENT)
Dept: FAMILY MEDICINE CLINIC | Facility: CLINIC | Age: 50
End: 2025-08-25
Payer: COMMERCIAL

## 2025-08-25 VITALS
WEIGHT: 272 LBS | OXYGEN SATURATION: 95 % | BODY MASS INDEX: 41.22 KG/M2 | TEMPERATURE: 97.3 F | RESPIRATION RATE: 16 BRPM | HEIGHT: 68 IN | SYSTOLIC BLOOD PRESSURE: 130 MMHG | HEART RATE: 69 BPM | DIASTOLIC BLOOD PRESSURE: 78 MMHG

## 2025-08-25 DIAGNOSIS — E78.2 MIXED HYPERLIPIDEMIA: ICD-10-CM

## 2025-08-25 DIAGNOSIS — E53.8 LOW SERUM VITAMIN B12: ICD-10-CM

## 2025-08-25 DIAGNOSIS — M1A.9XX0 CHRONIC GOUT WITHOUT TOPHUS, UNSPECIFIED CAUSE, UNSPECIFIED SITE: ICD-10-CM

## 2025-08-25 DIAGNOSIS — G47.33 OBSTRUCTIVE SLEEP APNEA: ICD-10-CM

## 2025-08-25 DIAGNOSIS — Z12.11 SCREEN FOR COLON CANCER: ICD-10-CM

## 2025-08-25 DIAGNOSIS — I10 PRIMARY HYPERTENSION: Primary | ICD-10-CM

## 2025-08-25 DIAGNOSIS — E55.9 VITAMIN D DEFICIENCY DISEASE: ICD-10-CM

## 2025-08-25 DIAGNOSIS — E66.01 MORBIDLY OBESE: ICD-10-CM

## 2025-08-25 DIAGNOSIS — F31.81 BIPOLAR 2 DISORDER: ICD-10-CM

## 2025-08-25 DIAGNOSIS — E03.9 HYPOTHYROIDISM, ACQUIRED: ICD-10-CM

## 2025-08-25 PROCEDURE — 99214 OFFICE O/P EST MOD 30 MIN: CPT | Performed by: PHYSICIAN ASSISTANT

## 2025-08-25 RX ORDER — HYDROXYZINE PAMOATE 50 MG/1
CAPSULE ORAL
COMMUNITY
Start: 2025-08-04

## 2025-08-25 RX ORDER — ESCITALOPRAM OXALATE 10 MG/1
1 TABLET ORAL DAILY
COMMUNITY
Start: 2025-08-04

## 2025-08-25 RX ORDER — ZOLPIDEM TARTRATE 10 MG/1
10 TABLET ORAL NIGHTLY PRN
COMMUNITY
Start: 2025-08-04